# Patient Record
Sex: FEMALE | Race: WHITE | NOT HISPANIC OR LATINO | Employment: FULL TIME | URBAN - METROPOLITAN AREA
[De-identification: names, ages, dates, MRNs, and addresses within clinical notes are randomized per-mention and may not be internally consistent; named-entity substitution may affect disease eponyms.]

---

## 2023-03-18 PROBLEM — R63.5 WEIGHT GAIN: Status: ACTIVE | Noted: 2023-03-18

## 2023-03-18 PROBLEM — J32.9 CHRONIC RECURRENT SINUSITIS: Status: ACTIVE | Noted: 2023-03-18

## 2023-03-18 PROBLEM — J32.9 RECURRENT SINUS INFECTIONS: Status: ACTIVE | Noted: 2023-03-18

## 2023-03-18 PROBLEM — J34.89 NASAL CRUSTING: Status: ACTIVE | Noted: 2023-03-18

## 2023-03-18 PROBLEM — M62.89 PELVIC FLOOR DYSFUNCTION: Status: ACTIVE | Noted: 2023-03-18

## 2023-03-18 PROBLEM — H69.92 DYSFUNCTION OF LEFT EUSTACHIAN TUBE: Status: ACTIVE | Noted: 2023-03-18

## 2023-03-18 PROBLEM — J31.0 MIXED RHINITIS: Status: ACTIVE | Noted: 2023-03-18

## 2023-03-18 PROBLEM — F41.9 ANXIETY: Status: ACTIVE | Noted: 2023-03-18

## 2023-03-18 PROBLEM — N89.8 VAGINAL ITCHING: Status: ACTIVE | Noted: 2023-03-18

## 2023-03-18 PROBLEM — R00.2 PALPITATIONS: Status: ACTIVE | Noted: 2023-03-18

## 2023-03-18 PROBLEM — E03.9 HYPOTHYROIDISM: Status: ACTIVE | Noted: 2023-03-18

## 2023-03-18 PROBLEM — J34.89 SINUS PRESSURE: Status: ACTIVE | Noted: 2023-03-18

## 2023-03-18 PROBLEM — M54.9 MECHANICAL BACK PAIN: Status: ACTIVE | Noted: 2023-03-18

## 2023-03-18 PROBLEM — G58.9 PINCHED NERVE: Status: ACTIVE | Noted: 2023-03-18

## 2023-03-18 PROBLEM — N63.10 PAINFUL LUMPY RIGHT BREAST: Status: ACTIVE | Noted: 2023-03-18

## 2023-03-18 PROBLEM — M99.08 RIB CAGE REGION SOMATIC DYSFUNCTION: Status: ACTIVE | Noted: 2023-03-18

## 2023-03-18 PROBLEM — F41.8 SITUATIONAL ANXIETY: Status: ACTIVE | Noted: 2023-03-18

## 2023-03-18 PROBLEM — E03.8 SECONDARY HYPOTHYROIDISM: Status: ACTIVE | Noted: 2023-03-18

## 2023-03-18 PROBLEM — M99.03 LUMBAR REGION SOMATIC DYSFUNCTION: Status: ACTIVE | Noted: 2023-03-18

## 2023-03-18 PROBLEM — R51.9 LEFT TEMPORAL HEADACHE: Status: ACTIVE | Noted: 2023-03-18

## 2023-03-18 PROBLEM — J34.89 NASAL OBSTRUCTION: Status: ACTIVE | Noted: 2023-03-18

## 2023-03-18 PROBLEM — M99.06 SOMATIC DYSFUNCTION OF LOWER EXTREMITIES: Status: ACTIVE | Noted: 2023-03-18

## 2023-03-18 PROBLEM — I87.2 CHRONIC VENOUS INSUFFICIENCY: Status: ACTIVE | Noted: 2023-03-18

## 2023-03-18 PROBLEM — M62.830 SPASM OF THORACIC BACK MUSCLE: Status: ACTIVE | Noted: 2023-03-18

## 2023-03-18 PROBLEM — R19.7 DIARRHEA: Status: ACTIVE | Noted: 2023-03-18

## 2023-03-18 PROBLEM — M99.00 CRANIAL SOMATIC DYSFUNCTION: Status: ACTIVE | Noted: 2023-03-18

## 2023-03-18 PROBLEM — F07.81 POST CONCUSSIVE SYNDROME: Status: ACTIVE | Noted: 2023-03-18

## 2023-03-18 PROBLEM — R39.9 UTI SYMPTOMS: Status: ACTIVE | Noted: 2023-03-18

## 2023-03-18 PROBLEM — J31.0 CHRONIC RHINITIS: Status: ACTIVE | Noted: 2023-03-18

## 2023-03-18 PROBLEM — D83.9 CVID (COMMON VARIABLE IMMUNODEFICIENCY) (MULTI): Status: ACTIVE | Noted: 2023-03-18

## 2023-03-18 PROBLEM — R42 DIZZINESS: Status: ACTIVE | Noted: 2023-03-18

## 2023-03-18 PROBLEM — G54.0 THORACIC OUTLET SYNDROME: Status: ACTIVE | Noted: 2023-03-18

## 2023-03-18 PROBLEM — M99.05 PELVIC REGION SOMATIC DYSFUNCTION: Status: ACTIVE | Noted: 2023-03-18

## 2023-03-18 PROBLEM — M79.604 RIGHT LEG PAIN: Status: ACTIVE | Noted: 2023-03-18

## 2023-03-18 PROBLEM — N94.9 VAGINAL LUMP: Status: ACTIVE | Noted: 2023-03-18

## 2023-03-18 PROBLEM — N94.6 DYSMENORRHEA: Status: ACTIVE | Noted: 2023-03-18

## 2023-03-18 PROBLEM — G43.009 COMMON MIGRAINE WITHOUT AURA: Status: ACTIVE | Noted: 2023-03-18

## 2023-03-18 PROBLEM — R09.81 NASAL CONGESTION: Status: ACTIVE | Noted: 2023-03-18

## 2023-03-18 PROBLEM — M54.9 UPPER BACK PAIN: Status: ACTIVE | Noted: 2023-03-18

## 2023-03-18 PROBLEM — M54.12 CERVICAL RADICULAR PAIN: Status: ACTIVE | Noted: 2023-03-18

## 2023-03-18 PROBLEM — E66.811 CLASS 1 OBESITY WITH BODY MASS INDEX (BMI) OF 32.0 TO 32.9 IN ADULT: Status: ACTIVE | Noted: 2023-03-18

## 2023-03-18 PROBLEM — I82.539 CHRONIC DEEP VEIN THROMBOSIS (DVT) OF POPLITEAL VEIN (MULTI): Status: ACTIVE | Noted: 2023-03-18

## 2023-03-18 PROBLEM — J45.30 MILD PERSISTENT ASTHMA (HHS-HCC): Status: ACTIVE | Noted: 2023-03-18

## 2023-03-18 PROBLEM — M77.9 TENDONITIS: Status: ACTIVE | Noted: 2023-03-18

## 2023-03-18 PROBLEM — M25.572 PAIN, JOINT, ANKLE, LEFT: Status: ACTIVE | Noted: 2023-03-18

## 2023-03-18 PROBLEM — J45.991 COUGH VARIANT ASTHMA (HHS-HCC): Status: ACTIVE | Noted: 2023-03-18

## 2023-03-18 PROBLEM — R59.1 LYMPHADENOPATHY: Status: ACTIVE | Noted: 2023-03-18

## 2023-03-18 PROBLEM — E66.9 CLASS 1 OBESITY WITH BODY MASS INDEX (BMI) OF 32.0 TO 32.9 IN ADULT: Status: ACTIVE | Noted: 2023-03-18

## 2023-03-18 PROBLEM — R09.82 POST-NASAL DRAINAGE: Status: ACTIVE | Noted: 2023-03-18

## 2023-03-18 PROBLEM — N36.8 URETHRAL IRRITATION: Status: ACTIVE | Noted: 2023-03-18

## 2023-03-18 PROBLEM — K21.9 GERD (GASTROESOPHAGEAL REFLUX DISEASE): Status: ACTIVE | Noted: 2023-03-18

## 2023-03-18 PROBLEM — M99.07 SEGMENTAL AND SOMATIC DYSFUNCTION OF UPPER EXTREMITY: Status: ACTIVE | Noted: 2023-03-18

## 2023-03-18 PROBLEM — H81.10 BENIGN POSITIONAL VERTIGO: Status: ACTIVE | Noted: 2023-03-18

## 2023-03-18 PROBLEM — R51.9 HEADACHE: Status: ACTIVE | Noted: 2023-03-18

## 2023-03-18 PROBLEM — S09.90XA HEAD TRAUMA: Status: ACTIVE | Noted: 2023-03-18

## 2023-03-18 PROBLEM — E66.811 CLASS 1 OBESITY WITH BODY MASS INDEX (BMI) OF 30.0 TO 30.9 IN ADULT: Status: ACTIVE | Noted: 2023-03-18

## 2023-03-18 PROBLEM — N94.10 DYSPAREUNIA IN FEMALE: Status: ACTIVE | Noted: 2023-03-18

## 2023-03-18 PROBLEM — S16.1XXA NECK STRAIN: Status: ACTIVE | Noted: 2023-03-18

## 2023-03-18 PROBLEM — M99.02 SEGMENTAL AND SOMATIC DYSFUNCTION OF THORACIC REGION: Status: ACTIVE | Noted: 2023-03-18

## 2023-03-18 PROBLEM — M54.50 RIGHT LOW BACK PAIN: Status: ACTIVE | Noted: 2023-03-18

## 2023-03-18 PROBLEM — M54.2 NECK PAIN: Status: ACTIVE | Noted: 2023-03-18

## 2023-03-18 PROBLEM — N92.0 HEAVY MENSES: Status: ACTIVE | Noted: 2023-03-18

## 2023-03-18 PROBLEM — R45.88 NONSUICIDAL SELF-HARM (MULTI): Status: ACTIVE | Noted: 2023-03-18

## 2023-03-18 PROBLEM — M25.512 LEFT SHOULDER PAIN: Status: ACTIVE | Noted: 2023-03-18

## 2023-03-18 PROBLEM — M99.01 CERVICAL SOMATIC DYSFUNCTION: Status: ACTIVE | Noted: 2023-03-18

## 2023-03-18 PROBLEM — M99.09 SOMATIC DYSFUNCTION OF ABDOMINAL REGION: Status: ACTIVE | Noted: 2023-03-18

## 2023-03-18 PROBLEM — E66.9 CLASS 1 OBESITY WITH BODY MASS INDEX (BMI) OF 30.0 TO 30.9 IN ADULT: Status: ACTIVE | Noted: 2023-03-18

## 2023-03-18 PROBLEM — K22.4 ESOPHAGEAL SPASM: Status: ACTIVE | Noted: 2023-03-18

## 2023-03-18 PROBLEM — J34.2 NASAL SEPTAL DEVIATION: Status: ACTIVE | Noted: 2023-03-18

## 2023-03-18 PROBLEM — R55 SYNCOPE AND COLLAPSE: Status: ACTIVE | Noted: 2023-03-18

## 2023-03-18 PROBLEM — Q74.2 ACCESSORY NAVICULAR BONE OF RIGHT FOOT: Status: ACTIVE | Noted: 2023-03-18

## 2023-03-18 PROBLEM — M54.2 CERVICALGIA: Status: ACTIVE | Noted: 2023-03-18

## 2023-03-18 PROBLEM — R45.0 JITTERY FEELING: Status: ACTIVE | Noted: 2023-03-18

## 2023-03-18 PROBLEM — N64.4 PAINFUL LUMPY RIGHT BREAST: Status: ACTIVE | Noted: 2023-03-18

## 2023-03-18 PROBLEM — M99.04 SOMATIC DYSFUNCTION OF SACRAL REGION: Status: ACTIVE | Noted: 2023-03-18
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
## 2023-03-18 PROBLEM — S06.0XAA CONCUSSION: Status: ACTIVE | Noted: 2023-03-18

## 2023-03-18 PROBLEM — J30.9 ALLERGIC RHINITIS: Status: ACTIVE | Noted: 2023-03-18

## 2023-03-18 PROBLEM — R19.8 CHANGE IN BOWEL MOVEMENT: Status: ACTIVE | Noted: 2023-03-18

## 2023-03-18 RX ORDER — IMMUNE GLOBULIN INFUSION (HUMAN) 100 MG/ML
15 INJECTION, SOLUTION INTRAVENOUS; SUBCUTANEOUS
COMMUNITY
Start: 2016-06-28

## 2023-03-18 RX ORDER — CETIRIZINE HYDROCHLORIDE 10 MG/1
10 TABLET ORAL DAILY
COMMUNITY

## 2023-03-18 RX ORDER — OMEPRAZOLE 20 MG/1
40 CAPSULE, DELAYED RELEASE ORAL DAILY PRN
COMMUNITY
Start: 2020-06-12 | End: 2023-12-18 | Stop reason: WASHOUT

## 2023-03-18 RX ORDER — FLUTICASONE FUROATE 27.5 UG/1
SPRAY, METERED NASAL
COMMUNITY
Start: 2022-02-07

## 2023-03-18 RX ORDER — PAROXETINE HYDROCHLORIDE 20 MG/1
3 TABLET, FILM COATED ORAL
COMMUNITY
Start: 2013-09-19 | End: 2023-05-30 | Stop reason: SDUPTHER

## 2023-03-18 RX ORDER — ALBUTEROL SULFATE 90 UG/1
2 AEROSOL, METERED RESPIRATORY (INHALATION) 4 TIMES DAILY PRN
COMMUNITY
Start: 2021-12-28

## 2023-03-18 RX ORDER — MUPIROCIN 20 MG/G
OINTMENT TOPICAL
COMMUNITY
Start: 2022-02-07

## 2023-03-18 RX ORDER — MULTIVITAMIN
1 TABLET ORAL
COMMUNITY
Start: 2020-06-12 | End: 2023-12-18 | Stop reason: WASHOUT

## 2023-03-18 RX ORDER — AZELASTINE HCL 205.5 UG/1
SPRAY NASAL
COMMUNITY
Start: 2022-02-07

## 2023-03-18 RX ORDER — LEVOTHYROXINE SODIUM 100 UG/1
1 CAPSULE ORAL
COMMUNITY
Start: 2022-06-21 | End: 2023-05-17 | Stop reason: SDUPTHER

## 2023-03-18 RX ORDER — LIDOCAINE AND PRILOCAINE 25; 25 MG/G; MG/G
CREAM TOPICAL
COMMUNITY
Start: 2020-11-20

## 2023-03-22 ENCOUNTER — APPOINTMENT (OUTPATIENT)
Dept: PRIMARY CARE | Facility: CLINIC | Age: 38
End: 2023-03-22
Payer: COMMERCIAL

## 2023-04-20 ENCOUNTER — TELEPHONE (OUTPATIENT)
Dept: PRIMARY CARE | Facility: CLINIC | Age: 38
End: 2023-04-20
Payer: COMMERCIAL

## 2023-04-20 NOTE — TELEPHONE ENCOUNTER
Patient requesting referral for new vascular doctor, states she has a hard time getting into her current one with [facility]. If you are agreeable, she needs a referral with her diagnosis of DVT faxed to [facility] at [phone] or if you need her to come in to discuss further, please let her know

## 2023-04-24 NOTE — TELEPHONE ENCOUNTER
Per HIPAA called and left a detailed msg to call the office and schedule a follow up appointment to discuss the referral in detail.

## 2023-05-02 ENCOUNTER — LAB (OUTPATIENT)
Dept: LAB | Facility: LAB | Age: 38
End: 2023-05-02
Payer: COMMERCIAL

## 2023-05-02 ENCOUNTER — OFFICE VISIT (OUTPATIENT)
Dept: PRIMARY CARE | Facility: CLINIC | Age: 38
End: 2023-05-02
Payer: COMMERCIAL

## 2023-05-02 VITALS
HEART RATE: 84 BPM | BODY MASS INDEX: 32.23 KG/M2 | WEIGHT: 188.8 LBS | OXYGEN SATURATION: 98 % | TEMPERATURE: 98.2 F | DIASTOLIC BLOOD PRESSURE: 76 MMHG | HEIGHT: 64 IN | SYSTOLIC BLOOD PRESSURE: 112 MMHG

## 2023-05-02 DIAGNOSIS — E03.9 HYPOTHYROIDISM, UNSPECIFIED TYPE: ICD-10-CM

## 2023-05-02 DIAGNOSIS — F41.9 ANXIETY: ICD-10-CM

## 2023-05-02 DIAGNOSIS — E16.2 HYPOGLYCEMIA: ICD-10-CM

## 2023-05-02 DIAGNOSIS — I87.2 VENOUS INSUFFICIENCY OF RIGHT LEG: Primary | ICD-10-CM

## 2023-05-02 PROBLEM — B96.89 ACUTE BACTERIAL SINUSITIS: Status: ACTIVE | Noted: 2023-05-02

## 2023-05-02 PROBLEM — S06.0X0A CONCUSSION WITH NO LOSS OF CONSCIOUSNESS: Status: ACTIVE | Noted: 2023-05-02

## 2023-05-02 PROBLEM — J01.90 ACUTE BACTERIAL SINUSITIS: Status: ACTIVE | Noted: 2023-05-02

## 2023-05-02 LAB — THYROTROPIN (MIU/L) IN SER/PLAS BY DETECTION LIMIT <= 0.05 MIU/L: 3.71 MIU/L (ref 0.44–3.98)

## 2023-05-02 PROCEDURE — 1036F TOBACCO NON-USER: CPT | Performed by: NURSE PRACTITIONER

## 2023-05-02 PROCEDURE — 84443 ASSAY THYROID STIM HORMONE: CPT

## 2023-05-02 PROCEDURE — 36415 COLL VENOUS BLD VENIPUNCTURE: CPT

## 2023-05-02 PROCEDURE — 99214 OFFICE O/P EST MOD 30 MIN: CPT | Performed by: NURSE PRACTITIONER

## 2023-05-02 RX ORDER — CARIPRAZINE 1.5 MG/1
1.5 CAPSULE, GELATIN COATED ORAL NIGHTLY
COMMUNITY
Start: 2023-04-11 | End: 2023-12-18

## 2023-05-02 RX ORDER — EPINEPHRINE 0.3 MG/.3ML
INJECTION SUBCUTANEOUS
COMMUNITY
Start: 2023-04-03

## 2023-05-02 ASSESSMENT — ENCOUNTER SYMPTOMS
COUGH: 0
PALPITATIONS: 0
CHEST TIGHTNESS: 0
CHILLS: 0
SHORTNESS OF BREATH: 0
FATIGUE: 0
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
## 2023-05-04 ASSESSMENT — ENCOUNTER SYMPTOMS
NERVOUS/ANXIOUS: 1
DIZZINESS: 1
HEADACHES: 1
DYSPHORIC MOOD: 0
SLEEP DISTURBANCE: 0

## 2023-05-08 ENCOUNTER — TELEPHONE (OUTPATIENT)
Dept: PRIMARY CARE | Facility: CLINIC | Age: 38
End: 2023-05-08
Payer: COMMERCIAL

## 2023-05-08 NOTE — TELEPHONE ENCOUNTER
----- Message from FADY Ornelas-CNP sent at 5/2/2023 10:25 PM EDT -----  Please let her know her TSH was in the normal range.

## 2023-05-16 ENCOUNTER — TELEPHONE (OUTPATIENT)
Dept: PRIMARY CARE | Facility: CLINIC | Age: 38
End: 2023-05-16
Payer: COMMERCIAL

## 2023-05-17 DIAGNOSIS — E03.8 SECONDARY HYPOTHYROIDISM: Primary | ICD-10-CM

## 2023-05-17 RX ORDER — LEVOTHYROXINE SODIUM 100 UG/1
100 CAPSULE ORAL
Qty: 90 CAPSULE | Refills: 3 | Status: SHIPPED | OUTPATIENT
Start: 2023-05-17 | End: 2024-05-09 | Stop reason: SDUPTHER

## 2023-05-26 ENCOUNTER — TELEPHONE (OUTPATIENT)
Dept: PRIMARY CARE | Facility: CLINIC | Age: 38
End: 2023-05-26
Payer: COMMERCIAL

## 2023-05-26 NOTE — TELEPHONE ENCOUNTER
Patient is no longer seeing her therapist, so is requiring a refill on Paxil 20mg to [facility]

## 2023-05-29 DIAGNOSIS — F41.9 ANXIETY: Primary | ICD-10-CM

## 2023-05-30 RX ORDER — PAROXETINE HYDROCHLORIDE 20 MG/1
60 TABLET, FILM COATED ORAL
Qty: 90 TABLET | Refills: 0 | Status: SHIPPED | OUTPATIENT
Start: 2023-05-30 | End: 2023-12-12 | Stop reason: SDUPTHER

## 2023-06-02 NOTE — TELEPHONE ENCOUNTER
Per HIPAA called and left detailed msg to call the office and let us know if she wants a referral or what she would like the next step to be.

## 2023-11-03 ENCOUNTER — TELEPHONE (OUTPATIENT)
Dept: PRIMARY CARE | Facility: CLINIC | Age: 38
End: 2023-11-03
Payer: COMMERCIAL

## 2023-11-04 DIAGNOSIS — E03.8 SECONDARY HYPOTHYROIDISM: ICD-10-CM

## 2023-12-12 ENCOUNTER — OFFICE VISIT (OUTPATIENT)
Dept: OBSTETRICS AND GYNECOLOGY | Facility: CLINIC | Age: 38
End: 2023-12-12
Payer: COMMERCIAL

## 2023-12-12 VITALS
DIASTOLIC BLOOD PRESSURE: 87 MMHG | HEART RATE: 83 BPM | SYSTOLIC BLOOD PRESSURE: 136 MMHG | WEIGHT: 188.2 LBS | BODY MASS INDEX: 33.35 KG/M2 | HEIGHT: 63 IN

## 2023-12-12 DIAGNOSIS — N80.9 ENDOMETRIOSIS: ICD-10-CM

## 2023-12-12 DIAGNOSIS — Z12.4 CERVICAL CANCER SCREENING: ICD-10-CM

## 2023-12-12 DIAGNOSIS — Z01.419 WELL WOMAN EXAM WITH ROUTINE GYNECOLOGICAL EXAM: Primary | ICD-10-CM

## 2023-12-12 LAB
POC APPEARANCE, URINE: CLEAR
POC BILIRUBIN, URINE: NEGATIVE
POC BLOOD, URINE: NEGATIVE
POC COLOR, URINE: NORMAL
POC GLUCOSE, URINE: NEGATIVE MG/DL
POC KETONES, URINE: NEGATIVE MG/DL
POC LEUKOCYTES, URINE: NEGATIVE
POC NITRITE,URINE: NEGATIVE
POC PH, URINE: 7 PH
POC PROTEIN, URINE: NEGATIVE MG/DL
POC SPECIFIC GRAVITY, URINE: 1.01
POC UROBILINOGEN, URINE: 0.2 EU/DL

## 2023-12-12 PROCEDURE — 1036F TOBACCO NON-USER: CPT | Performed by: NURSE PRACTITIONER

## 2023-12-12 PROCEDURE — 99395 PREV VISIT EST AGE 18-39: CPT | Performed by: NURSE PRACTITIONER

## 2023-12-12 PROCEDURE — 87624 HPV HI-RISK TYP POOLED RSLT: CPT | Performed by: NURSE PRACTITIONER

## 2023-12-12 PROCEDURE — 88141 CYTOPATH C/V INTERPRET: CPT | Performed by: PATHOLOGY

## 2023-12-12 PROCEDURE — 88175 CYTOPATH C/V AUTO FLUID REDO: CPT | Mod: TC,GCY | Performed by: NURSE PRACTITIONER

## 2023-12-12 RX ORDER — DIPHENHYDRAMINE HCL 25 MG
25 TABLET ORAL
COMMUNITY

## 2023-12-12 RX ORDER — OMEPRAZOLE 40 MG/1
40 CAPSULE, DELAYED RELEASE ORAL DAILY
COMMUNITY

## 2023-12-12 RX ORDER — PAROXETINE 30 MG/1
60 TABLET, FILM COATED ORAL DAILY
COMMUNITY
Start: 2023-11-26

## 2023-12-12 RX ORDER — BUDESONIDE 0.5 MG/2ML
0.5 INHALANT ORAL
COMMUNITY
Start: 2023-11-09

## 2023-12-12 ASSESSMENT — ENCOUNTER SYMPTOMS
RESPIRATORY NEGATIVE: 1
EYES NEGATIVE: 1
CONSTITUTIONAL NEGATIVE: 1
DYSURIA: 1
PSYCHIATRIC NEGATIVE: 1
NEUROLOGICAL NEGATIVE: 1
CARDIOVASCULAR NEGATIVE: 1
GASTROINTESTINAL NEGATIVE: 1

## 2023-12-12 ASSESSMENT — PAIN SCALES - GENERAL: PAINLEVEL: 0-NO PAIN

## 2023-12-12 ASSESSMENT — PATIENT HEALTH QUESTIONNAIRE - PHQ9
2. FEELING DOWN, DEPRESSED OR HOPELESS: NOT AT ALL
1. LITTLE INTEREST OR PLEASURE IN DOING THINGS: NOT AT ALL
SUM OF ALL RESPONSES TO PHQ9 QUESTIONS 1 AND 2: 0

## 2023-12-12 NOTE — PROGRESS NOTES
"Subjective   Rachelle Laurent is a 37 y.o. female who is here for a routine exam. Periods are irregular, 24 - 31 days lasting  varies  days. Dysmenorrhea:severe, occurring throughout menses and not specified . Heavy bleeding with menstrual period. Cyclic symptoms include none. No intermenstrual bleeding, spotting, or discharge.    Current contraception: none  History of abnormal Pap smear: no  Family history of uterine or ovarian cancer: no  Regular self breast exam: yes  History of abnormal mammogram: no  Family history of breast cancer: no  History of abnormal lipids: no  Menstrual History:  OB History    No obstetric history on file.          Patient's last menstrual period was 11/26/2023 (exact date).       HPI  Record Review  - Last seen by FADY Timmons-CNP Feb. '22 for pelvic floor issues and was supposed to have a workup done for endometriosis   - Referred to PFPT  - Normal PAP in '21, due for PAP in '24  - Gets a gammaguard white blood cell infusion once a week due to having a white blood cell deficiency  - Was informed that she is high risk of cervical cancer    OB/GYN Hx:  - She was going to a fertility specialist, and they determined that it was better if she did not try to have children given her health issues   - Saw a GYN at [facility], but did not like them so she wants to be established at   - Had a cyst removed off of her cervix, causing dyspenuria to stop    Vaginal/ Pelvic floor  - Never attended PFPT due to a concussion  - Does not bleed after intercourse   Review of Systems   Constitutional: Negative.    HENT: Negative.     Eyes: Negative.    Respiratory: Negative.     Cardiovascular: Negative.    Gastrointestinal: Negative.    Genitourinary:  Positive for dysuria. Negative for dyspareunia.   Neurological: Negative.    Psychiatric/Behavioral: Negative.         Objective   /87 (BP Location: Right arm, Patient Position: Sitting)   Pulse 83   Ht 1.6 m (5' 3\")   Wt 85.4 " kg (188 lb 3.2 oz)   LMP 11/26/2023 (Exact Date)   Breastfeeding No   BMI 33.34 kg/m²     OBGyn Exam   General:   alert, appears stated age, and cooperative   Heart: regular rate and rhythm, S1, S2 normal, no murmur, click, rub or gallop   Lungs: clear to auscultation bilaterally   Abdomen: soft, non-tender, without masses or organomegaly   Vulva: normal   Vagina: normal mucosa   Cervix: no lesions   Uterus: normal size   Adnexa: normal adnexa     Assessment/Plan   37 y.o. assessed in clinic for [facility]   Comorbidities include: Chronic rhinitis, HG, GERD, Anxiety    Pap smear..  - PAP collected  - Normal breast exam  - Referral to Dr. Patricia Olvera sent     Follow-up with Dr. Olvera for further GYN care    DIONNE Walsh Megan Terrell, am scribing for virtually, and in the presence of DIONNE Timmons on 12/12/2023 at 8:24 PM.  I, DIONNE Walsh, personally performed the services described in this documentation which was scribed virtually and I confirm that it is both accurate and complete.

## 2023-12-29 LAB
CYTOLOGY CMNT CVX/VAG CYTO-IMP: NORMAL
HPV HR 12 DNA GENITAL QL NAA+PROBE: NEGATIVE
HPV HR GENOTYPES PNL CVX NAA+PROBE: NEGATIVE
HPV16 DNA SPEC QL NAA+PROBE: NEGATIVE
HPV18 DNA SPEC QL NAA+PROBE: NEGATIVE
LAB AP CONTRACEPTIVE HISTORY: NORMAL
LAB AP HPV GENOTYPE QUESTION: YES
LAB AP HPV HR: NORMAL
LABORATORY COMMENT REPORT: NORMAL
LMP START DATE: NORMAL
PATH REPORT.TOTAL CANCER: NORMAL

## 2024-01-03 ENCOUNTER — TELEPHONE (OUTPATIENT)
Dept: OBSTETRICS AND GYNECOLOGY | Facility: CLINIC | Age: 39
End: 2024-01-03
Payer: COMMERCIAL

## 2024-01-04 NOTE — RESULT ENCOUNTER NOTE
Pap with ASCUS result, which means that the cell is slightly abnormal in shape but not concerning because HPV -.    Per ASCCP guidelines, repeat tests in 3 years

## 2024-01-16 NOTE — TELEPHONE ENCOUNTER
Result Communication    Resulted Orders   POCT UA Automated manually resulted   Result Value Ref Range    POC Color, Urine Light-Yellow Straw, Yellow, Light-Yellow    POC Appearance, Urine Clear Clear    POC Glucose, Urine NEGATIVE NEGATIVE mg/dl    POC Bilirubin, Urine NEGATIVE NEGATIVE    POC Ketones, Urine NEGATIVE NEGATIVE mg/dl    POC Specific Gravity, Urine 1.010 1.005 - 1.035    POC Blood, Urine NEGATIVE NEGATIVE    POC PH, Urine 7.0 No Reference Range Established PH    POC Protein, Urine NEGATIVE NEGATIVE, 30 (1+) mg/dl    POC Urobilinogen, Urine 0.2 0.2, 1.0 EU/DL    Poc Nitrite, Urine NEGATIVE NEGATIVE    POC Leukocytes, Urine NEGATIVE NEGATIVE   THINPREP PAP TEST   Result Value Ref Range    Case Report       Gynecologic Cytology                              Case: [id number]                                   Authorizing Provider:  Kiesha Kellogg,         Collected:           12/12/2023 [facility]                                     APRN-CNP                                                                     Ordering Location:     [facility]   Received:            12/12/2023 [address] [place] [address]                                                                First Screen:          Elma Kink, CT                                                                Pathologist:           Chel Ballesteros MD                                                         Specimen:    ThinPrep Liquid-Based Pap-Imaging System Screen, CERVIX, SCREENING                         Final Cytological Interpretation       Squamous and/or Glandular Abnormality    A. THINPREP PAP CERVIX, SCREENING -     Specimen Adequacy  Satisfactory for evaluation; endocervical/transformation zone component is present    General Categorization  Epithelial cell abnormality- squamous cell, see interpretation.    Descriptive Interpretation  Atypical squamous cells of undetermined significance (ASC-US) -  Cervix      An HPV-including Genotype test is performed (per requisition) by the Molecular Diagnostics Laboratory at [facility].                Slide(s) initially screened by JACQUIE Hewitt at [facility] [address] [facility] [id number]  By the signature on this report, the individual or group listed as making the Final Interpretation/Diagnosis certifies that they have reviewed this case.       ThinPrep Imaging System       This specimen has been analyzed by the Notice KioskPrep Imaging System ([employer] an automated imaging and review system, which assists the laboratory in evaluating cells on ThinPrep Pap tests. Following automated imaging, selected fields from every slide were reviewed by a cytotechnologist and/or pathologist.        Educational Note       Cervical cytology is a screening procedure primarily for squamous cancers and precursors and has associated false-negative and false-positives results as evidenced by published data. Your patient's test should be interpreted in this context, together with the patient's history and clinical findings. Regular sampling and follow-up of unexplained clinical signs and symptoms are recommended to minimize false negative results.      Perform HPV HR test? Reflex if ASCUS only     Include HPV Genotype? Yes     LMP 11/26/2023     Contraceptive History       [diaphragm     HPV DNA High Risk With Genotype   Result Value Ref Range    HPV, high-risk Negative Negative    HPV Type 16 DNA Negative Negative    HPV Type 18 DNA Negative Negative    HPV non-Type 16 or 18 DNA Negative Negative    Narrative     Testing for high-risk (HR) types of human papilloma virus (HPV) is performed by the [employer] denise HPV Test. The denise HPV Test is a qualitative polymerase chain reaction that amplifies DNA of HPV16, HPV18, and 12 other high-risk HPV types (31, 33, 35, 39, 45, 51, 52, 56, 58, 59, 66, and 68) associated with cervical cancer and its precursor  lesions. A positive result indicates the presence of HPV DNA due to one or more of the 14 genotypes: 16, 18, 31, 33, 35, 39, 45, 51, 52, 56, 58, 59, 66, and 68. Negative results indicates HPV DNA concentrations are undectectable or below the pre-set threshold for detection. False negative results may be associated with unoptimized sampling. A negative HR HPV result does not exclude the possibility of future cytologic HSIL or underlying CIN2-3 or cancer.   This test is approved by the US Food and Drug Administration. Results of this test should be interpreted in conjunction with the patient Pap test results. Please refer to ASCCP current quidelines for the use of HPV DNA testing, result interpretation, and patient management.   The performance of this test was verified by the [facility] Diagnostic Laboratory at [facility]. The lab is certified under the Clinical Laboratory Amendments of 1988 (CLIA 88) as qualified to perform high complexity clinical laboratory testing.    PERFORMING LAB LOCATIONS  [facility]: [facility] [place] [address]         1:18 PM      Results were successfully communicated with the patient and they acknowledged their understanding.    Discussed of plan to see Dr Olvera for further GYN care

## 2024-01-16 NOTE — TELEPHONE ENCOUNTER
----- Message from FADY Walsh-CNP sent at 1/4/2024  1:29 PM EST -----  Pap with ASCUS result, which means that the cell is slightly abnormal in shape but not concerning because HPV -.    Per ASCCP guidelines, repeat tests in 3 years

## 2024-01-19 ENCOUNTER — OFFICE VISIT (OUTPATIENT)
Dept: OBSTETRICS AND GYNECOLOGY | Facility: CLINIC | Age: 39
End: 2024-01-19
Payer: COMMERCIAL

## 2024-01-19 VITALS — WEIGHT: 188 LBS | BODY MASS INDEX: 33.3 KG/M2

## 2024-01-19 DIAGNOSIS — L50.8 CHRONIC URTICARIA: ICD-10-CM

## 2024-01-19 DIAGNOSIS — N80.9 ENDOMETRIOSIS: ICD-10-CM

## 2024-01-19 DIAGNOSIS — D83.9 CVID (COMMON VARIABLE IMMUNODEFICIENCY) (MULTI): Primary | ICD-10-CM

## 2024-01-19 PROCEDURE — 99204 OFFICE O/P NEW MOD 45 MIN: CPT | Performed by: STUDENT IN AN ORGANIZED HEALTH CARE EDUCATION/TRAINING PROGRAM

## 2024-01-19 PROCEDURE — 1036F TOBACCO NON-USER: CPT | Performed by: STUDENT IN AN ORGANIZED HEALTH CARE EDUCATION/TRAINING PROGRAM

## 2024-01-19 RX ORDER — ACETAMINOPHEN 500 MG
1000 TABLET ORAL EVERY 6 HOURS PRN
COMMUNITY

## 2024-01-19 RX ORDER — FAMOTIDINE 40 MG/1
40 TABLET, FILM COATED ORAL 2 TIMES DAILY
COMMUNITY
Start: 2024-01-02

## 2024-01-19 ASSESSMENT — PAIN SCALES - GENERAL: PAINLEVEL: 0-NO PAIN

## 2024-01-19 NOTE — PROGRESS NOTES
Division of Minimally Invasive Gynecologic Surgery  [facility]    01/19/24 Gynecology Consult     HISTORY OF PRESENT ILLNESS:  Rachelle Laurent 38 y.o. P0 referred by Kiesha Kellogg for suspected endometriosis.     She has a long history of severe dysmenorrhea, with periods that vacillate between light and heavy. Monthly menses, lasting 2-4 days. Her dysmenorrhea has been severe enough in the past to keep her home from work. She does have dysuria on her cycle, but denies dyschezia and dyspareunia.     She has been struggling with fertility for some time. She did see CLIVE and [facility] at [facility]. She was told by CEDRIC that her L Fallopian tube was blocked. She spoke with MFM about her health risks for pregnancy at length, including hx of DVT while on BEHZAD and thrombogenic immune medication, as well her history of CVID. She also has well-managed OCD on paroxetine and reports it would recommended she come off of this medication for pregnancy.     She has decided that she does not think undertaking pregnancy is reasonable for her, but she is still considering surrogacy.     She tried BEHZAD's many years ago, but developed DVT on this method as above. Not currently on any suppression. She wonders whether endometriosis could account for her painful menses.     Imaging: Recent HSG unremarkable, no other recent pelvic imaging   Screening:   - Last pap 2023 ASCUS/negative, no hx of CIN2-3  PMHx: CVID, hx of DVT on BEHZAD in the context of concurrent thrombogenic immune medication, chronic urticaria (eyes swell, flare w/ in last year), chronic sinusitis, OCD   PSHx: oral surgery, upper endoscopy     PAST MEDICAL HISTORY:  Past Medical History:   Diagnosis Date    Acute deep vein thrombosis (DVT) of femoral vein of right lower extremity (CMS/HCC) 12/06/2016    Formatting of this note might be different from the original. Diagnosed 10/29/2016    CVID (common variable immunodeficiency) (CMS/[facility])     Deviated  nasal septum 06/09/2013    Acquired deviated nasal septum    Obsessive-compulsive disorder, unspecified 06/09/2013    Obsessive compulsive disorder    Other conditions influencing health status 06/09/2013    Brachial Plexus Palsy    Unspecified asthma, uncomplicated 06/09/2013    Extrinsic asthma     PAST SURGICAL HISTORY:  Past Surgical History:   Procedure Laterality Date    MOUTH SURGERY  07/02/2014    Oral Surgery Tooth Extraction       PHYSICAL EXAMINATION:  VITAL SIGNS: Wt 85.3 kg (188 lb)   LMP 12/26/2023   BMI 33.30 kg/m²      Constitutional:  No acute distress, well-nourished and well-developed  HEENT: EOM grossly intact, MMM, neck supple and with full ROM  Pulm:  Effort normal. No accessory muscle usage.  No respiratory distress.  Neurological:  She is alert and oriented to person place and time.  Skin: Warm, no pallor.  Psychiatric:  She has normal mood and affect.    ASSESSMENT:  Rachelle Laurent 38 y.o. P0 referred by Kiesha Kellogg for suspected endometriosis.   - We discussed today the multiple etiologies of chronic pelvic pain, including endometriosis, adenomyosis, GI etiologies, MSK etiologies, and centralization of pain. I am primarily suspicious of endometriosis in her case. We discussed the role of surgical excision in the management of endometriosis and the estimated 15-20% recurrence rate in the future. We also reviewed the association between endometriosis and adenomyosis, as well as the fact that definitive diagnosis of adenomyosis cannot be made without hysterectomy.   - We discussed the natural history of endometriosis and the fact that hormonal suppression is thought to have a role in slowing disease progression and managing symptoms of endometriosis, but cannot definitively reverse or eliminate endometriosis.   - She has not tried suppression medications in quite some time, and I would recommend proceeding with a trial of these medications before considering surgery. We did however  discuss the role and procedure for excisional surgery.   - We discussed options today, including POPs and Mirena IUD, at length.   - She is going to consider her options and will let me know when she makes a decision.   - I did also recommend PFPT and yoga for pelvic pain in the interim.       Patricia Olvera MD  01/19/24  10:24 AM

## 2024-02-06 ENCOUNTER — APPOINTMENT (OUTPATIENT)
Dept: PRIMARY CARE | Facility: CLINIC | Age: 39
End: 2024-02-06
Payer: COMMERCIAL

## 2024-02-28 ENCOUNTER — OFFICE VISIT (OUTPATIENT)
Dept: PRIMARY CARE | Facility: CLINIC | Age: 39
End: 2024-02-28
Payer: COMMERCIAL

## 2024-02-28 ENCOUNTER — LAB (OUTPATIENT)
Dept: LAB | Facility: LAB | Age: 39
End: 2024-02-28
Payer: COMMERCIAL

## 2024-02-28 VITALS
TEMPERATURE: 97.7 F | DIASTOLIC BLOOD PRESSURE: 82 MMHG | HEART RATE: 87 BPM | WEIGHT: 184.8 LBS | SYSTOLIC BLOOD PRESSURE: 122 MMHG | OXYGEN SATURATION: 98 % | BODY MASS INDEX: 32.74 KG/M2

## 2024-02-28 DIAGNOSIS — K21.9 GASTROESOPHAGEAL REFLUX DISEASE, UNSPECIFIED WHETHER ESOPHAGITIS PRESENT: ICD-10-CM

## 2024-02-28 DIAGNOSIS — R53.83 OTHER FATIGUE: Primary | ICD-10-CM

## 2024-02-28 DIAGNOSIS — E03.9 HYPOTHYROIDISM, UNSPECIFIED TYPE: Primary | ICD-10-CM

## 2024-02-28 DIAGNOSIS — E03.9 HYPOTHYROIDISM, UNSPECIFIED TYPE: ICD-10-CM

## 2024-02-28 LAB
25(OH)D3 SERPL-MCNC: 13 NG/ML (ref 30–100)
ALBUMIN SERPL BCP-MCNC: 4.6 G/DL (ref 3.4–5)
ALP SERPL-CCNC: 45 U/L (ref 33–110)
ALT SERPL W P-5'-P-CCNC: 12 U/L (ref 7–45)
ANION GAP SERPL CALC-SCNC: 11 MMOL/L (ref 10–20)
AST SERPL W P-5'-P-CCNC: 18 U/L (ref 9–39)
BASOPHILS # BLD AUTO: 0.03 X10*3/UL (ref 0–0.1)
BASOPHILS NFR BLD AUTO: 0.8 %
BILIRUB SERPL-MCNC: 0.3 MG/DL (ref 0–1.2)
BUN SERPL-MCNC: 13 MG/DL (ref 6–23)
CALCIUM SERPL-MCNC: 9.3 MG/DL (ref 8.6–10.3)
CHLORIDE SERPL-SCNC: 102 MMOL/L (ref 98–107)
CO2 SERPL-SCNC: 26 MMOL/L (ref 21–32)
CREAT SERPL-MCNC: 0.77 MG/DL (ref 0.5–1.05)
CRP SERPL-MCNC: 0.14 MG/DL
EGFRCR SERPLBLD CKD-EPI 2021: >90 ML/MIN/1.73M*2
EOSINOPHIL # BLD AUTO: 0 X10*3/UL (ref 0–0.7)
EOSINOPHIL NFR BLD AUTO: 0 %
ERYTHROCYTE [DISTWIDTH] IN BLOOD BY AUTOMATED COUNT: 13.2 % (ref 11.5–14.5)
ERYTHROCYTE [SEDIMENTATION RATE] IN BLOOD BY WESTERGREN METHOD: 10 MM/H (ref 0–20)
FERRITIN SERPL-MCNC: 17 NG/ML (ref 8–150)
GLUCOSE SERPL-MCNC: 80 MG/DL (ref 74–99)
HCT VFR BLD AUTO: 37.4 % (ref 36–46)
HGB BLD-MCNC: 11.7 G/DL (ref 12–16)
IMM GRANULOCYTES # BLD AUTO: 0.01 X10*3/UL (ref 0–0.7)
IMM GRANULOCYTES NFR BLD AUTO: 0.3 % (ref 0–0.9)
IRON SATN MFR SERPL: 15 % (ref 25–45)
IRON SERPL-MCNC: 63 UG/DL (ref 35–150)
LYMPHOCYTES # BLD AUTO: 1.19 X10*3/UL (ref 1.2–4.8)
LYMPHOCYTES NFR BLD AUTO: 32.9 %
MCH RBC QN AUTO: 26.7 PG (ref 26–34)
MCHC RBC AUTO-ENTMCNC: 31.3 G/DL (ref 32–36)
MCV RBC AUTO: 85 FL (ref 80–100)
MONOCYTES # BLD AUTO: 0.43 X10*3/UL (ref 0.1–1)
MONOCYTES NFR BLD AUTO: 11.9 %
NEUTROPHILS # BLD AUTO: 1.96 X10*3/UL (ref 1.2–7.7)
NEUTROPHILS NFR BLD AUTO: 54.1 %
NRBC BLD-RTO: 0 /100 WBCS (ref 0–0)
PLATELET # BLD AUTO: 269 X10*3/UL (ref 150–450)
POTASSIUM SERPL-SCNC: 4.3 MMOL/L (ref 3.5–5.3)
PROT SERPL-MCNC: 7.7 G/DL (ref 6.4–8.2)
RBC # BLD AUTO: 4.38 X10*6/UL (ref 4–5.2)
SODIUM SERPL-SCNC: 135 MMOL/L (ref 136–145)
TIBC SERPL-MCNC: 422 UG/DL (ref 240–445)
TSH SERPL-ACNC: 3.16 MIU/L (ref 0.44–3.98)
UIBC SERPL-MCNC: 359 UG/DL (ref 110–370)
WBC # BLD AUTO: 3.6 X10*3/UL (ref 4.4–11.3)

## 2024-02-28 PROCEDURE — 36415 COLL VENOUS BLD VENIPUNCTURE: CPT

## 2024-02-28 PROCEDURE — 82728 ASSAY OF FERRITIN: CPT

## 2024-02-28 PROCEDURE — 83540 ASSAY OF IRON: CPT

## 2024-02-28 PROCEDURE — 83550 IRON BINDING TEST: CPT

## 2024-02-28 PROCEDURE — 85652 RBC SED RATE AUTOMATED: CPT

## 2024-02-28 PROCEDURE — 82306 VITAMIN D 25 HYDROXY: CPT

## 2024-02-28 PROCEDURE — 99213 OFFICE O/P EST LOW 20 MIN: CPT | Performed by: NURSE PRACTITIONER

## 2024-02-28 PROCEDURE — 80053 COMPREHEN METABOLIC PANEL: CPT

## 2024-02-28 PROCEDURE — 86140 C-REACTIVE PROTEIN: CPT

## 2024-02-28 PROCEDURE — 82784 ASSAY IGA/IGD/IGG/IGM EACH: CPT

## 2024-02-28 PROCEDURE — 85025 COMPLETE CBC W/AUTO DIFF WBC: CPT

## 2024-02-28 PROCEDURE — 1036F TOBACCO NON-USER: CPT | Performed by: NURSE PRACTITIONER

## 2024-02-28 PROCEDURE — 84443 ASSAY THYROID STIM HORMONE: CPT

## 2024-02-28 RX ORDER — AMOXICILLIN AND CLAVULANATE POTASSIUM 875; 125 MG/1; MG/1
1 TABLET, FILM COATED ORAL EVERY 12 HOURS
COMMUNITY

## 2024-02-28 ASSESSMENT — ENCOUNTER SYMPTOMS
NAUSEA: 1
COUGH: 0
DIARRHEA: 0
WHEEZING: 0
WEAKNESS: 0
FEVER: 0
VOMITING: 0
HEADACHES: 0
CHILLS: 0
CHEST TIGHTNESS: 0
PALPITATIONS: 0
FATIGUE: 1
DIZZINESS: 0
CONSTIPATION: 1
PAIN: 1
NUMBNESS: 0

## 2024-02-28 ASSESSMENT — PATIENT HEALTH QUESTIONNAIRE - PHQ9
SUM OF ALL RESPONSES TO PHQ9 QUESTIONS 1 AND 2: 0
2. FEELING DOWN, DEPRESSED OR HOPELESS: NOT AT ALL
1. LITTLE INTEREST OR PLEASURE IN DOING THINGS: NOT AT ALL

## 2024-02-28 NOTE — PROGRESS NOTES
Subjective   Rachelle Laurent is a 38 y.o. female who presents for Med Refill (Levothyroxine ), Pain (Pain with urination. Gets weekly infusions), and Medication Question (Would like to try a tablet instead of capsules for Levothyroxine ).    Med Refill  Associated symptoms include fatigue and nausea. Pertinent negatives include no chest pain, chills, coughing, fever, headaches, numbness, vomiting or weakness.   Pain  Associated symptoms include constipation, fatigue and nausea. Pertinent negatives include no chest pain, diarrhea, fever, headaches, vomiting, weakness or wheezing.     She presents to the office today for medication refills and follow up.    She reports she has had burning with urination intermittently for 2-3 months. No frequency or urgency.  No fever or chills.   Has seen GYN - has had urine tested which did not show an infection.  She is UTD on PAP.    She would like to switch to Levothyroxine tablets due to GI upset. Has about 3 more months of current capsules which she would like to finish.  Has had hives on the tablet but still would like to try again  Will obtain an updated TSH level now and then also after the switch.    She is now following with psychiatry regularly.  She is following in [place].    Overall she has been feeling pretty good. She has done well working from home or since not working- much less infections that when she worked in person. Has been having a hard time finding a new job to work from home.    Diet: Overall pretty healthy-has a sweet tooth  Exercise: hiking 1-2 times a week and weight lifting.    Review of Systems   Constitutional:  Positive for fatigue. Negative for chills and fever.   Respiratory:  Negative for cough, chest tightness and wheezing.    Cardiovascular:  Negative for chest pain and palpitations.   Gastrointestinal:  Positive for constipation and nausea. Negative for diarrhea and vomiting.   Neurological:  Negative for dizziness, weakness, numbness  and headaches.     Objective   /82 (BP Location: Left arm, Patient Position: Sitting)   Pulse 87   Temp 36.5 °C (97.7 °F) (Temporal)   Wt 83.8 kg (184 lb 12.8 oz)   SpO2 98%   BMI 32.74 kg/m²     Physical Exam  Constitutional:       General: She is not in acute distress.     Appearance: Normal appearance. She is not toxic-appearing.   Eyes:      Extraocular Movements: Extraocular movements intact.      Conjunctiva/sclera: Conjunctivae normal.      Pupils: Pupils are equal, round, and reactive to light.   Neck:      Thyroid: No thyroid mass or thyromegaly.   Cardiovascular:      Rate and Rhythm: Normal rate and regular rhythm.      Pulses: Normal pulses.      Heart sounds: Normal heart sounds, S1 normal and S2 normal. No murmur heard.  Pulmonary:      Effort: Pulmonary effort is normal. No respiratory distress.      Breath sounds: Normal breath sounds and air entry.   Abdominal:      General: Bowel sounds are normal.      Palpations: Abdomen is soft.      Tenderness: There is no abdominal tenderness.   Musculoskeletal:      Right lower leg: No edema.      Left lower leg: No edema.   Lymphadenopathy:      Cervical: No cervical adenopathy.   Neurological:      Mental Status: She is alert and oriented to person, place, and time.   Psychiatric:         Attention and Perception: Attention normal.         Mood and Affect: Mood and affect normal.         Behavior: Behavior normal. Behavior is cooperative.         Thought Content: Thought content normal.         Cognition and Memory: Cognition normal.         Judgment: Judgment normal.       Assessment/Plan   Problem List Items Addressed This Visit       GERD (gastroesophageal reflux disease)     Stable on omeprazole.  Had a recent EGD.  She is following with GI.         Hypothyroidism - Primary    Relevant Orders    TSH with reflex to Free T4 if abnormal (Completed)       It has been a pleasure seeing you today!   This was a shared visit with the CHILANGO. I reviewed and verified the documentation.

## 2024-02-29 LAB
IGA SERPL-MCNC: 125 MG/DL (ref 70–400)
IGG SERPL-MCNC: 1500 MG/DL (ref 700–1600)
IGM SERPL-MCNC: 106 MG/DL (ref 40–230)

## 2024-03-05 ENCOUNTER — OFFICE VISIT (OUTPATIENT)
Dept: SPORTS MEDICINE | Facility: HOSPITAL | Age: 39
End: 2024-03-05
Payer: COMMERCIAL

## 2024-03-05 VITALS
OXYGEN SATURATION: 98 % | HEART RATE: 79 BPM | BODY MASS INDEX: 32.6 KG/M2 | WEIGHT: 184 LBS | HEIGHT: 63 IN | DIASTOLIC BLOOD PRESSURE: 77 MMHG | SYSTOLIC BLOOD PRESSURE: 131 MMHG

## 2024-03-05 DIAGNOSIS — S16.1XXA CERVICAL STRAIN, ACUTE, INITIAL ENCOUNTER: Primary | ICD-10-CM

## 2024-03-05 DIAGNOSIS — S06.0X0A CONCUSSION WITHOUT LOSS OF CONSCIOUSNESS, INITIAL ENCOUNTER: ICD-10-CM

## 2024-03-05 PROCEDURE — 99215 OFFICE O/P EST HI 40 MIN: CPT | Performed by: PEDIATRICS

## 2024-03-05 PROCEDURE — 1036F TOBACCO NON-USER: CPT | Performed by: PEDIATRICS
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
## 2024-03-08 ENCOUNTER — OFFICE VISIT (OUTPATIENT)
Dept: ORTHOPEDIC SURGERY | Facility: CLINIC | Age: 39
End: 2024-03-08
Payer: COMMERCIAL

## 2024-03-08 VITALS
WEIGHT: 183.75 LBS | DIASTOLIC BLOOD PRESSURE: 77 MMHG | HEIGHT: 63 IN | HEART RATE: 92 BPM | BODY MASS INDEX: 32.56 KG/M2 | SYSTOLIC BLOOD PRESSURE: 123 MMHG

## 2024-03-08 DIAGNOSIS — M99.00 SOMATIC DYSFUNCTION OF HEAD REGION: ICD-10-CM

## 2024-03-08 DIAGNOSIS — S06.0X0D CONCUSSION WITHOUT LOSS OF CONSCIOUSNESS, SUBSEQUENT ENCOUNTER: ICD-10-CM

## 2024-03-08 DIAGNOSIS — M99.07 SOMATIC DYSFUNCTION OF UPPER EXTREMITY: ICD-10-CM

## 2024-03-08 DIAGNOSIS — M99.01 SOMATIC DYSFUNCTION OF CERVICAL REGION: ICD-10-CM

## 2024-03-08 DIAGNOSIS — M99.02 SOMATIC DYSFUNCTION OF THORACIC REGION: ICD-10-CM

## 2024-03-08 DIAGNOSIS — M54.2 CERVICAL PAIN (NECK): Primary | ICD-10-CM

## 2024-03-08 DIAGNOSIS — M99.03 SOMATIC DYSFUNCTION OF LUMBAR REGION: ICD-10-CM

## 2024-03-08 PROCEDURE — 99214 OFFICE O/P EST MOD 30 MIN: CPT | Performed by: PEDIATRICS

## 2024-03-08 PROCEDURE — 98927 OSTEOPATH MANJ 5-6 REGIONS: CPT | Performed by: PEDIATRICS

## 2024-03-08 PROCEDURE — 1036F TOBACCO NON-USER: CPT | Performed by: PEDIATRICS

## 2024-03-08 ASSESSMENT — PAIN SCALES - GENERAL: PAINLEVEL: 6

## 2024-03-24 NOTE — PROGRESS NOTES
Physical Therapy    Physical Therapy Evaluation and Treatment      Patient Name: Rachelle Laurent  MRN: [id number]  Today's Date: 3/28/2024     PT Evaluation Time Entry  PT Evaluation (Moderate) Time Entry: 30  PT Therapeutic Procedures Time Entry  Therapeutic Exercise Time Entry: 25     Visit number: 1  Insurance: Kenia  Authorized visits: 30  Authorization end date: 6/27/2024    Assessment:  PT Assessment  PT Assessment Results: Decreased range of motion, Decreased endurance, Decreased mobility, Impaired vision, Pain  Rehab Prognosis: Good   Patient presents to therapy this date with a diagnosis of a concussion with resulting decreased ROM and activity tolerance, increased pain, increased VOMS, dizziness, headaches, and overall decreased quality of life.  Patient can benefit from skilled physical therapy to address her deficits and maximize the quality of her life.  Plan:  OP PT Plan  Treatment/Interventions: Education/ Instruction, Canalith repositioning, Manual therapy, Neuromuscular re-education, Therapeutic activities, Therapeutic exercises  PT Plan: Skilled PT  PT Frequency: 1 time per week  Duration: 12 weeks  Onset Date: 03/05/24  Rehab Potential: Good  Plan of Care Agreement: Patient    Current Problem:   1. Dizzy  Follow Up In Physical Therapy      2. Cervical strain, acute, initial encounter  Referral to Physical Therapy    Follow Up In Physical Therapy      3. Concussion without loss of consciousness, initial encounter  Referral to Physical Therapy    Follow Up In Physical Therapy      4. Cervicogenic headache  Follow Up In Physical Therapy          Subjective    Patient continues to have symptoms:  Screens watching TV(movement, sound and brightness) average 15 min  Scrolling on phone or Computer 2 min  Light: with computer 10 min  Dizzy: with retrieving objects from ground currently 0/10, last week  0-5/10.  Occurs with hunger, end of day denies with rolling in bed,   Driving: no free way no issues  with street drive  Trouble falling asleep: which is not without working out   Staying asleep due to neck pain, neck pain absent when in [place].  Neck pain currently 1/10, last week 1-8/10.  Worse with pillow, sitting on couch, working out(if use poor technique), better with working out, different bed and stretching(been doing old PT exercise)  Memory is off, forgets which room she was going into.  Headache daily currently 1/10 , last week 0-3/10 hat band position.    Walking for 1 mile to 1.5 miles 30-50 min with random spurts of dizziness walking varying paths.  Dizziness increased with scanning environment and increased HR >120 BPM.    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 On 3/5/24 her 78lb dog jerked in her arms and hit her L side of head with her neck.  Hurt by eye bone where she was hit and contralateral one.  Min sx 3/4/25 - neck pain, felt a little out of it.  Went for a walk later.  Watched tv - no sx w screen, trouble falling asleep.  Slept extra 20 min compared to usual. Bothered her scrolling on [employer].  Brightness ok though. TV today. HA, balance, dizzy, fatigue, trouble falling asleep, noise sen, foggy, memory.  Took 2 tylenol 3.4 and 3/5  iced. Mild noise sens.  Short walk 3/5/24 no problems.       3/11/24 She was referred by Dr. Johnson for OMT of her cervical spine. She continues to have concussion symptoms. Her neck pain is substantial today. She did switch pillows which has been a little helpful. He has been having intermittent headaches. Denies new numbness, tingling, weakness.  She plans to travel to [place] in the upcoming weeks.     Precautions:  Precautions  STEADI Fall Risk Score (The score of 4 or more indicates an  increased risk of falling): 2  Vital Signs:     Pain:  Pain Assessment  Pain Assessment: 0-10  Pain Score:  (neck 1/10 HA 1/10)  Home Living:     Prior Level of Function:     Headache prior 1-2 time a week located bridge of nose    Objective     CERVICAL ROM:    AROM   EXTENSION 50   FLEXION 50   RIGHT ROTATION 65   LEFT ROTATION 65   RIGHT LATERAL FLEXION 23   LEFT LATERAL FLEXION 40     CERVICAL SPECIAL TEST:  TRANSVERSE LIGAMENT  NEG   SHARP-YELITZA NEG      VERTEBRAL ARTERY SUBJECTIVE REPORTING FOR THE FOLLOWING:     NEGATIVE POSITIVE   DYPLOPIA X    DYSARTHRIA X    DYSPHAGIA X    NYSTAGMUS X    DROP ATTACK X    NAUSEA X    FACIAL NUMBNESS X    DIZZINESS X        OCULAR MOTOR EXAM:   NORMAL ABNORMAL   RANGE OF MOTION X    SMOOTH PURSUIT  X    HORIZONTAL SACCADES X    VERTICAL SACCADES X    EYE ALIGNMENT X    COVER X    UNCOVER X    CROSS COVER X    CONVERGENCE X      ALL OF THE BELOW WITH GOGGLES UNLESS OTHERWISE NOTED:  SPONTANEOUS    GAZE EVOKED    POST HEAD SHAKING NYSTAGMUS HORIZONTAL    POST HEAD SHAKING NYSTAGMUS VERTICAL      POSITIONAL TESTING:  [place] and [place] negative for dizziness or nystagmus    STATIC BALANCE:    HALF TANDEM:   EYES OPEN EYES CLOSED   SOLID 30 30   FOAM 30 30      ONELEG   EYES OPEN EYES CLOSED   SOLID 30 28 left  6 right       VESTIBULAR OCCULOMOTOR SENSITIVITY TEST:  Below rated out of 10, 0 no symptoms and 10 worst imaginable.   HEADACHE DIZZY NAUSEA FOGGINESS COMMENTS   BASELINE 1 0 0 0    SMOOTH PURSUIT 2 REPS VERTICAL AND HORIZONTAL 1 0 0 0    SACCADES HORIZONTAL 10REPS 1 0 2 0    SACCADES VERTICAL 10 REPS 1 0 2 0 Under shoot with correction   CONVERGENCE  3 TRIALS  2 0 1 0 MEASUREMENT IN cm  AVERAGE:< 5 cm   VOR HORIZONTAL 180 RPM 10 REPS 1 1 1 0    VOR VERTICAL 180 RPM 10 REPS 2 0 1 0    VMS TEST  50 RPM 5 REPS 2 0 1 0         Outcome Measures:  Other Measures  10M Walk Test: 5 sec  Visual-Oculomotor Scale (VOMS): 19  Other Outcome Measures: Post concussion symptom rating scale  "(10)     Treatments:    Discussed continuing cervical stretching, stop \"B\", walk 30-45 min keep HR below 130 BPM    EDUCATION:  Outpatient Education  Individual(s) Educated: Patient  Education Provided: POC, Home Exercise Program  Patient/Caregiver Demonstrated Understanding: yes  Plan of Care Discussed and Agreed Upon: yes  Patient Response to Education: Patient/Caregiver Verbalized Understanding of Information    Goals:  Active       PT Problem       PT Goal 1:  Patient will report her headache frequency and severity 75% better       Start:  03/29/24    Expected End:  06/27/24            PT Goal 2:  Patient will increase cervical lateral flexion to 0-35 degrees to allow the patient to scan environment without pain.        Start:  03/29/24    Expected End:  06/27/24            PT Goal 3:  Patient decrease VOMS to 10 points to decrease her motion sensitivity.       Start:  03/29/24    Expected End:  06/27/24            PT Goal 4:  Patient will increase exercise tolerance to 20 min at 145 to allow for return to aerobic activity       Start:  03/29/24    Expected End:  06/27/24            PT Goal 5:  Patient will be independent in Home Exercise Program to manage symptoms and maximize their functional independence.        Start:  03/29/24    Expected End:  06/27/24            Patient Stated Goal 1:  Work out full strength, get rid on dizziness and help neck pain       Start:  03/29/24    Expected End:  06/27/24                      "

## 2024-03-26 NOTE — TELEPHONE ENCOUNTER
LOV: 5.02.23     Know she need appointment asking for 30 days of Tirosint    [facility]  
Message relayed to PT via VM asked to call back is she needs to have RX resent to the pharmacy.  
No

## 2024-03-28 ENCOUNTER — EVALUATION (OUTPATIENT)
Dept: PHYSICAL THERAPY | Facility: CLINIC | Age: 39
End: 2024-03-28
Payer: COMMERCIAL

## 2024-03-28 DIAGNOSIS — G44.86 CERVICOGENIC HEADACHE: ICD-10-CM

## 2024-03-28 DIAGNOSIS — R42 DIZZY: Primary | ICD-10-CM

## 2024-03-28 DIAGNOSIS — S16.1XXA CERVICAL STRAIN, ACUTE, INITIAL ENCOUNTER: ICD-10-CM

## 2024-03-28 DIAGNOSIS — S06.0X0A CONCUSSION WITHOUT LOSS OF CONSCIOUSNESS, INITIAL ENCOUNTER: ICD-10-CM

## 2024-03-28 PROCEDURE — 97162 PT EVAL MOD COMPLEX 30 MIN: CPT | Mod: GP | Performed by: PHYSICAL THERAPIST

## 2024-03-28 PROCEDURE — 97110 THERAPEUTIC EXERCISES: CPT | Mod: GP | Performed by: PHYSICAL THERAPIST

## 2024-03-28 ASSESSMENT — ENCOUNTER SYMPTOMS
LOSS OF SENSATION IN FEET: 0
OCCASIONAL FEELINGS OF UNSTEADINESS: 0
DEPRESSION: 0

## 2024-03-28 ASSESSMENT — 10 METER WALK TEST (10METWT): AVERAGE: 5 SEC

## 2024-03-28 ASSESSMENT — PAIN - FUNCTIONAL ASSESSMENT: PAIN_FUNCTIONAL_ASSESSMENT: 0-10

## 2024-04-04 ENCOUNTER — LAB (OUTPATIENT)
Dept: LAB | Facility: LAB | Age: 39
End: 2024-04-04
Payer: COMMERCIAL

## 2024-04-04 DIAGNOSIS — E55.9 VITAMIN D DEFICIENCY, UNSPECIFIED: ICD-10-CM

## 2024-04-04 DIAGNOSIS — E61.1 IRON DEFICIENCY: Primary | ICD-10-CM

## 2024-04-04 LAB
25(OH)D3 SERPL-MCNC: 34 NG/ML (ref 30–100)
BASOPHILS # BLD AUTO: 0.05 X10*3/UL (ref 0–0.1)
BASOPHILS NFR BLD AUTO: 0.9 %
EOSINOPHIL # BLD AUTO: 0 X10*3/UL (ref 0–0.7)
EOSINOPHIL NFR BLD AUTO: 0 %
ERYTHROCYTE [DISTWIDTH] IN BLOOD BY AUTOMATED COUNT: 14 % (ref 11.5–14.5)
FERRITIN SERPL-MCNC: 20 NG/ML (ref 8–150)
HCT VFR BLD AUTO: 37.2 % (ref 36–46)
HGB BLD-MCNC: 11.6 G/DL (ref 12–16)
IMM GRANULOCYTES # BLD AUTO: 0.01 X10*3/UL (ref 0–0.7)
IMM GRANULOCYTES NFR BLD AUTO: 0.2 % (ref 0–0.9)
IRON SATN MFR SERPL: 13 % (ref 25–45)
IRON SERPL-MCNC: 56 UG/DL (ref 35–150)
LYMPHOCYTES # BLD AUTO: 1.89 X10*3/UL (ref 1.2–4.8)
LYMPHOCYTES NFR BLD AUTO: 32.9 %
MCH RBC QN AUTO: 27.1 PG (ref 26–34)
MCHC RBC AUTO-ENTMCNC: 31.2 G/DL (ref 32–36)
MCV RBC AUTO: 87 FL (ref 80–100)
MONOCYTES # BLD AUTO: 0.58 X10*3/UL (ref 0.1–1)
MONOCYTES NFR BLD AUTO: 10.1 %
NEUTROPHILS # BLD AUTO: 3.21 X10*3/UL (ref 1.2–7.7)
NEUTROPHILS NFR BLD AUTO: 55.9 %
NRBC BLD-RTO: 0 /100 WBCS (ref 0–0)
PLATELET # BLD AUTO: 242 X10*3/UL (ref 150–450)
RBC # BLD AUTO: 4.28 X10*6/UL (ref 4–5.2)
TIBC SERPL-MCNC: 417 UG/DL (ref 240–445)
TRANSFERRIN SERPL-MCNC: 316 MG/DL (ref 200–360)
UIBC SERPL-MCNC: 361 UG/DL (ref 110–370)
WBC # BLD AUTO: 5.7 X10*3/UL (ref 4.4–11.3)

## 2024-04-04 PROCEDURE — 36415 COLL VENOUS BLD VENIPUNCTURE: CPT

## 2024-04-04 PROCEDURE — 82306 VITAMIN D 25 HYDROXY: CPT

## 2024-04-04 PROCEDURE — 84466 ASSAY OF TRANSFERRIN: CPT

## 2024-04-04 PROCEDURE — 85025 COMPLETE CBC W/AUTO DIFF WBC: CPT

## 2024-04-04 PROCEDURE — 83540 ASSAY OF IRON: CPT

## 2024-04-04 PROCEDURE — 83550 IRON BINDING TEST: CPT

## 2024-04-04 PROCEDURE — 82728 ASSAY OF FERRITIN: CPT

## 2024-04-08 ENCOUNTER — TREATMENT (OUTPATIENT)
Dept: PHYSICAL THERAPY | Facility: CLINIC | Age: 39
End: 2024-04-08
Payer: COMMERCIAL

## 2024-04-08 DIAGNOSIS — G44.86 CERVICOGENIC HEADACHE: ICD-10-CM

## 2024-04-08 DIAGNOSIS — S06.0X0A CONCUSSION WITHOUT LOSS OF CONSCIOUSNESS, INITIAL ENCOUNTER: ICD-10-CM

## 2024-04-08 DIAGNOSIS — S16.1XXA CERVICAL STRAIN, ACUTE, INITIAL ENCOUNTER: ICD-10-CM

## 2024-04-08 DIAGNOSIS — R42 DIZZY: Primary | ICD-10-CM

## 2024-04-08 PROCEDURE — 97110 THERAPEUTIC EXERCISES: CPT | Mod: GP | Performed by: PHYSICAL THERAPIST

## 2024-04-08 ASSESSMENT — PAIN - FUNCTIONAL ASSESSMENT: PAIN_FUNCTIONAL_ASSESSMENT: 0-10

## 2024-04-08 NOTE — PROGRESS NOTES
"Physical Therapy    Physical Therapy Treatment    Patient Name: Rachelle Laurent  MRN: [id number]  Today's Date: 4/8/2024  Time Calculation  Start Time: 0415  Stop Time: 0500  Time Calculation (min): 45 min     PT Therapeutic Procedures Time Entry  Therapeutic Exercise Time Entry: 40     Visit number: 1  Insurance: Kenia  Authorized visits: 30  Authorization end date: 6/27/2024    Assessment:     Patient with good tolerance of exercises up to 148 BPM without symptoms but report of PER of 19/20 so Meghan stopped.      Plan:     Continue to work on cervical tone to control headaches and cervical pain.    Current Problem  1. Dizzy  Follow Up In Physical Therapy      2. Cervical strain, acute, initial encounter  Follow Up In Physical Therapy      3. Concussion without loss of consciousness, initial encounter  Follow Up In Physical Therapy      4. Cervicogenic headache  Follow Up In Physical Therapy        Subjective    Patient reports staring at sun for the last 2 hours(eclipse) today.  Patient states headaches are on and off, but struggled with more spaciness.  Driving for one hour caused increased headache and had to get a ride home.  Patient reports that it was not so much the headache that concerned her just the mental fatigue.  Patient reports more mental fatigue with increased sun.    Pain  Pain Assessment  Pain Assessment: 0-10  Pain Score:  (headache behind eye left)    Objective     Treatments:  Lateral flexion stretch 3 times 30\" *   Levator scap 3 times 30\" *  Meghan with HR of 148 without symptoms of concussion and exertion rating of 19/20 so Meghan stopped.  Patient to work out at normal work out with exertion at hard as well as without concussion symptoms    SCOTT  Discussed continuing cervical stretching, stop \"B\", walk 30-45 min keep HR below 130 BPM    OP EDUCATION:       Goals:  Active       PT Problem       PT Goal 1:  Patient will report her headache frequency and severity 75% better       Start:  " 03/29/24    Expected End:  06/27/24            PT Goal 2:  Patient will increase cervical lateral flexion to 0-35 degrees to allow the patient to scan environment without pain.        Start:  03/29/24    Expected End:  06/27/24            PT Goal 3:  Patient decrease VOMS to 10 points to decrease her motion sensitivity.       Start:  03/29/24    Expected End:  06/27/24            PT Goal 4:  Patient will increase exercise tolerance to 20 min at 145 to allow for return to aerobic activity       Start:  03/29/24    Expected End:  06/27/24            PT Goal 5:  Patient will be independent in Home Exercise Program to manage symptoms and maximize their functional independence.        Start:  03/29/24    Expected End:  06/27/24            Patient Stated Goal 1:  Work out full strength, get rid on dizziness and help neck pain       Start:  03/29/24    Expected End:  06/27/24

## 2024-04-12 DIAGNOSIS — S06.0X0A CONCUSSION WITHOUT LOSS OF CONSCIOUSNESS, INITIAL ENCOUNTER: Primary | ICD-10-CM

## 2024-04-18 ENCOUNTER — TREATMENT (OUTPATIENT)
Dept: PHYSICAL THERAPY | Facility: CLINIC | Age: 39
End: 2024-04-18
Payer: COMMERCIAL

## 2024-04-18 DIAGNOSIS — S16.1XXA CERVICAL STRAIN, ACUTE, INITIAL ENCOUNTER: ICD-10-CM

## 2024-04-18 DIAGNOSIS — R42 DIZZY: ICD-10-CM

## 2024-04-18 DIAGNOSIS — G44.86 CERVICOGENIC HEADACHE: ICD-10-CM

## 2024-04-18 DIAGNOSIS — S06.0X0A CONCUSSION WITHOUT LOSS OF CONSCIOUSNESS, INITIAL ENCOUNTER: ICD-10-CM

## 2024-04-18 PROCEDURE — 97110 THERAPEUTIC EXERCISES: CPT | Mod: GP | Performed by: PHYSICAL THERAPIST

## 2024-04-18 ASSESSMENT — PAIN - FUNCTIONAL ASSESSMENT: PAIN_FUNCTIONAL_ASSESSMENT: 0-10

## 2024-04-18 NOTE — PROGRESS NOTES
"Physical Therapy    Physical Therapy Treatment    Patient Name: Rachelle Laurent  MRN: [id number]  Today's Date: 4/18/2024  Time Calculation  Start Time: 1115  Stop Time: 1200  Time Calculation (min): 45 min     PT Therapeutic Procedures Time Entry  Therapeutic Exercise Time Entry: 40     Visit number: 3  Insurance: Kenia  Authorized visits: 30  Authorization end date: 6/27/2024    Assessment:     Patient with improved understanding of how to use increased touch sense to decrease sensation of imbalance.    Plan:     Continue to work on decreased symptoms of concussion    Current Problem  1. Cervical strain, acute, initial encounter  Follow Up In Physical Therapy      2. Concussion without loss of consciousness, initial encounter  Follow Up In Physical Therapy      3. Cervicogenic headache  Follow Up In Physical Therapy      4. Dizzy  Follow Up In Physical Therapy        Subjective    Patient reports possible re concussion due to hitting her head, increased fog for a short period of time, light sensitivity, scuffing feet, slight dizzy with turning head, increased head floating with walking, noises increased agitation, little headache but at normal level, bending increased heavy head sensation, squatting has sensations of movement.      Pain  Pain Assessment  Pain Assessment: 0-10  Pain Score:  (TMJ left 2/10 earlier, right side head and neck 3/10)    Objective     Treatments:  Target  BPM     4/18/2024:  Squats with cues to feel feet to control balance and postural sway  Calf raises with cues to keep core activation 3 positions 10 times 1 set *   Eccentric calf raises 10 times 1 set *     Prior Visit  Lateral flexion stretch 3 times 30\" *   Levator scap 3 times 30\" *  Meghan with HR of 148 without symptoms of concussion and exertion rating of 19/20 so Meghan stopped.  Patient to work out at normal work out with exertion at hard as well as without concussion symptoms    SCOTT  Discussed continuing cervical " "stretching, stop \"B\", walk 30-45 min keep HR below 130 BPM    OP EDUCATION:       Goals:  Active       PT Problem       PT Goal 1:  Patient will report her headache frequency and severity 75% better       Start:  03/29/24    Expected End:  06/27/24            PT Goal 2:  Patient will increase cervical lateral flexion to 0-35 degrees to allow the patient to scan environment without pain.        Start:  03/29/24    Expected End:  06/27/24            PT Goal 3:  Patient decrease VOMS to 10 points to decrease her motion sensitivity.       Start:  03/29/24    Expected End:  06/27/24            PT Goal 4:  Patient will increase exercise tolerance to 20 min at 145 to allow for return to aerobic activity       Start:  03/29/24    Expected End:  06/27/24            PT Goal 5:  Patient will be independent in Home Exercise Program to manage symptoms and maximize their functional independence.        Start:  03/29/24    Expected End:  06/27/24            Patient Stated Goal 1:  Work out full strength, get rid on dizziness and help neck pain       Start:  03/29/24    Expected End:  06/27/24              "

## 2024-04-23 NOTE — PROGRESS NOTES
"Physical Therapy    Physical Therapy Treatment    Patient Name: Rachelle Laurent  MRN: [id number]  Today's Date: 4/25/2024              Visit number: 4   Insurance: [employer]  Authorized visits: 30  Authorization end date: 6/27/2024    Assessment:     Patient complaints of difficulty with vision at distance greater than arm length and not wearing glasses due to decreased tolerance.  Patient complaint is for tone on tone solid light colored stairs.     Plan:     Continue to work on     Current Problem  1. Cervical strain, acute, initial encounter  Follow Up In Physical Therapy      2. Concussion without loss of consciousness, initial encounter  Follow Up In Physical Therapy      3. Cervicogenic headache  Follow Up In Physical Therapy      4. Dizzy  Follow Up In Physical Therapy          Subjective    Patient reports that she is fatigued from recent trip to [facility]..  Patient reports that she is having trouble with light headed fuzzy foggy dizzy.  Patient reports difficulty judging edge of step.      Pain     Patient without complaints of pain.    Objective     Treatments:  Target  BPM    4/25/2024:  Patient encouraged to return to working out again.  Patient issued opticokinetic videos with report of poor tolerance.    4/18/2024:  Squats with cues to feel feet to control balance and postural sway  Calf raises with cues to keep core activation 3 positions 10 times 1 set *   Eccentric calf raises 10 times 1 set *     Prior Visit  Lateral flexion stretch 3 times 30\" *   Levator scap 3 times 30\" *  Meghan with HR of 148 without symptoms of concussion and exertion rating of 19/20 so Meghan stopped.  Patient to work out at normal work out with exertion at hard as well as without concussion symptoms    EVAL  Discussed continuing cervical stretching, stop \"B\", walk 30-45 min keep HR below 130 BPM    OP EDUCATION:       Goals:  Active       PT Problem       PT Goal 1:  Patient will report her headache frequency and " severity 75% better       Start:  03/29/24    Expected End:  06/27/24            PT Goal 2:  Patient will increase cervical lateral flexion to 0-35 degrees to allow the patient to scan environment without pain.        Start:  03/29/24    Expected End:  06/27/24            PT Goal 3:  Patient decrease VOMS to 10 points to decrease her motion sensitivity.       Start:  03/29/24    Expected End:  06/27/24            PT Goal 4:  Patient will increase exercise tolerance to 20 min at 145 to allow for return to aerobic activity       Start:  03/29/24    Expected End:  06/27/24            PT Goal 5:  Patient will be independent in Home Exercise Program to manage symptoms and maximize their functional independence.        Start:  03/29/24    Expected End:  06/27/24            Patient Stated Goal 1:  Work out full strength, get rid on dizziness and help neck pain       Start:  03/29/24    Expected End:  06/27/24

## 2024-04-25 ENCOUNTER — TREATMENT (OUTPATIENT)
Dept: PHYSICAL THERAPY | Facility: CLINIC | Age: 39
End: 2024-04-25
Payer: COMMERCIAL

## 2024-04-25 DIAGNOSIS — G44.86 CERVICOGENIC HEADACHE: ICD-10-CM

## 2024-04-25 DIAGNOSIS — S16.1XXA CERVICAL STRAIN, ACUTE, INITIAL ENCOUNTER: ICD-10-CM

## 2024-04-25 DIAGNOSIS — R42 DIZZY: ICD-10-CM

## 2024-04-25 DIAGNOSIS — S06.0X0A CONCUSSION WITHOUT LOSS OF CONSCIOUSNESS, INITIAL ENCOUNTER: ICD-10-CM

## 2024-04-25 PROCEDURE — 97110 THERAPEUTIC EXERCISES: CPT | Mod: GP | Performed by: PHYSICAL THERAPIST

## 2024-05-01 NOTE — PROGRESS NOTES
Physical Therapy    Physical Therapy Treatment    Patient Name: Rachelle Laurent  MRN: [id number]  Today's Date: 2024  Time Calculation  Start Time: 1115  Stop Time: 1200  Time Calculation (min): 45 min     PT Therapeutic Procedures Time Entry  Manual Therapy Time Entry: 40     Visit number: 5   Insurance: [employer]  Authorized visits: 30  Authorization end date: 2024  Primary Diagnosis: dizzy    Assessment:     Patient with decreased tone in cervical musculature    Plan:     Continue to monitor Concussion symptoms    Current Problem  1. Dizzy  Follow Up In Physical Therapy      2. Cervicogenic headache  Follow Up In Physical Therapy      3. Concussion without loss of consciousness, subsequent encounter        4. Cervical strain, acute, initial encounter  Follow Up In Physical Therapy      5. Concussion without loss of consciousness, initial encounter  Follow Up In Physical Therapy            Subjective    Patient reports that she is having headaches at end of day.   Patient states that she is getting headache after infusion.  Patient states that she has only been walking, her fit bit  and she is awaiting her new fit bit.  She has been working on power walking.  Patient continues to be light sensitive.  Screens are not bothering her this week as much.  Patient states [place] issues other than fatigue.    Pain     Patient without complaints of pain.    Objective     Treatments:  Target  BPM  2024:  Manual therapy: 40  MFR to posterior scalenes, semi spinalis.  Cross fiber to posterior scalenes and semispinalis.  Stripping to Middle scalenes bilaterally.    Discussed use of raquette balls for self trigger point release.    2024:  Patient encouraged to return to working out again.  Patient issued opticokinetic videos with report of poor tolerance.    2024:  Squats with cues to feel feet to control balance and postural sway  Calf raises with cues to keep core  "activation 3 positions 10 times 1 set *   Eccentric calf raises 10 times 1 set *     Prior Visit  Lateral flexion stretch 3 times 30\" *   Levator scap 3 times 30\" *  Meghan with HR of 148 without symptoms of concussion and exertion rating of 19/20 so Meghan stopped.  Patient to work out at normal work out with exertion at hard as well as without concussion symptoms    SCOTT  Discussed continuing cervical stretching, stop \"B\", walk 30-45 min keep HR below 130 BPM    OP EDUCATION:       Goals:  Active       PT Problem       PT Goal 1:  Patient will report her headache frequency and severity 75% better       Start:  03/29/24    Expected End:  06/27/24            PT Goal 2:  Patient will increase cervical lateral flexion to 0-35 degrees to allow the patient to scan environment without pain.        Start:  03/29/24    Expected End:  06/27/24            PT Goal 3:  Patient decrease VOMS to 10 points to decrease her motion sensitivity.       Start:  03/29/24    Expected End:  06/27/24            PT Goal 4:  Patient will increase exercise tolerance to 20 min at 145 to allow for return to aerobic activity       Start:  03/29/24    Expected End:  06/27/24            PT Goal 5:  Patient will be independent in Home Exercise Program to manage symptoms and maximize their functional independence.        Start:  03/29/24    Expected End:  06/27/24            Patient Stated Goal 1:  Work out full strength, get rid on dizziness and help neck pain       Start:  03/29/24    Expected End:  06/27/24                  "

## 2024-05-02 ENCOUNTER — TREATMENT (OUTPATIENT)
Dept: PHYSICAL THERAPY | Facility: CLINIC | Age: 39
End: 2024-05-02
Payer: COMMERCIAL

## 2024-05-02 DIAGNOSIS — G44.86 CERVICOGENIC HEADACHE: ICD-10-CM

## 2024-05-02 DIAGNOSIS — S06.0X0A CONCUSSION WITHOUT LOSS OF CONSCIOUSNESS, INITIAL ENCOUNTER: ICD-10-CM

## 2024-05-02 DIAGNOSIS — R42 DIZZY: Primary | ICD-10-CM

## 2024-05-02 DIAGNOSIS — S06.0X0D CONCUSSION WITHOUT LOSS OF CONSCIOUSNESS, SUBSEQUENT ENCOUNTER: ICD-10-CM

## 2024-05-02 DIAGNOSIS — S16.1XXA CERVICAL STRAIN, ACUTE, INITIAL ENCOUNTER: ICD-10-CM

## 2024-05-02 PROCEDURE — 97140 MANUAL THERAPY 1/> REGIONS: CPT | Mod: GP | Performed by: PHYSICAL THERAPIST

## 2024-05-08 NOTE — PROGRESS NOTES
Physical Therapy    Physical Therapy Treatment    Patient Name: Rachelle Laurent  MRN: [id number]  Today's Date: 5/9/2024              Visit number: 6   Insurance: [employer]  Authorized visits: 30  Authorization end date: 6/27/2024  Primary Diagnosis: dizzy    Assessment:     Patient without increased symptoms.  Patient with reports of symptoms at home with bending forward but not with diagonal fold overs a more complex task.  Patient with correction of looking down with her eyes and head not just head to improve posture and decrease cervicothoracic strain.  Patient with continued reports of motion sensitivity and decreased follow through with videos.    Plan:     Continue to monitor Concussion symptoms    Current Problem  1. Dizzy  Follow Up In Physical Therapy      2. Cervicogenic headache  Follow Up In Physical Therapy      3. Concussion without loss of consciousness, subsequent encounter        4. Cervical strain, acute, initial encounter  Follow Up In Physical Therapy      5. Concussion without loss of consciousness, initial encounter  Follow Up In Physical Therapy        Subjective    Patient states that her concussion symptoms have been pretty good, still hard to walk and looking down, up and down difficulty with balance.    Pain  Pain Assessment  Pain Assessment: 0-10  Pain Score:  (normal rainy day pain 3/10)    Objective     Treatments:  Target  BPM    5/9/2024:  Neuro 30  3/4 Tandem EO 30 seconds*  3/4 Tandem EO Horizontal head turns 10 times 1 set *   3/4 Tandem EO Vertical head turns 10 times 1 set *   3/4 Tandem EC 30 seconds *  3/4 Tandem EC  Horizontal head turns 10 times 1 set *   3/4 Tandem EC  Vertical head turns 10 times 1 set *     Floor to over head reach do faster to decrease    Jaime string no issues    Ball bounce up and down and side to side    Therapeutic exercises: 10 min   Upper trap   Levator scap  Posture in sitting and standing to decreased cervicothoracic stress and  "strain    5/2/2024:  Manual therapy: 40  MFR to posterior scalenes, semi spinalis.  Cross fiber to posterior scalenes and semispinalis.  Stripping to Middle scalenes bilaterally.    Discussed use of raquette balls for self trigger point release.    4/25/2024:  Patient encouraged to return to working out again.  Patient issued opticokinetic videos with report of poor tolerance.    4/18/2024:  Squats with cues to feel feet to control balance and postural sway  Calf raises with cues to keep core activation 3 positions 10 times 1 set *   Eccentric calf raises 10 times 1 set *     Prior Visit  Lateral flexion stretch 3 times 30\" *   Levator scap 3 times 30\" *  Meghan with HR of 148 without symptoms of concussion and exertion rating of 19/20 so Meghan stopped.  Patient to work out at normal work out with exertion at hard as well as without concussion symptoms    EVAL  Discussed continuing cervical stretching, stop \"B\", walk 30-45 min keep HR below 130 BPM    OP EDUCATION:       Goals:  Active       PT Problem       PT Goal 1:  Patient will report her headache frequency and severity 75% better       Start:  03/29/24    Expected End:  06/27/24            PT Goal 2:  Patient will increase cervical lateral flexion to 0-35 degrees to allow the patient to scan environment without pain.        Start:  03/29/24    Expected End:  06/27/24            PT Goal 3:  Patient decrease VOMS to 10 points to decrease her motion sensitivity.       Start:  03/29/24    Expected End:  06/27/24            PT Goal 4:  Patient will increase exercise tolerance to 20 min at 145 to allow for return to aerobic activity       Start:  03/29/24    Expected End:  06/27/24            PT Goal 5:  Patient will be independent in Home Exercise Program to manage symptoms and maximize their functional independence.        Start:  03/29/24    Expected End:  06/27/24            Patient Stated Goal 1:  Work out full strength, get rid on dizziness and help neck pain  "      Start:  03/29/24    Expected End:  06/27/24

## 2024-05-09 ENCOUNTER — TREATMENT (OUTPATIENT)
Dept: PHYSICAL THERAPY | Facility: CLINIC | Age: 39
End: 2024-05-09
Payer: COMMERCIAL

## 2024-05-09 DIAGNOSIS — S16.1XXA CERVICAL STRAIN, ACUTE, INITIAL ENCOUNTER: ICD-10-CM

## 2024-05-09 DIAGNOSIS — R42 DIZZY: Primary | ICD-10-CM

## 2024-05-09 DIAGNOSIS — G44.86 CERVICOGENIC HEADACHE: ICD-10-CM

## 2024-05-09 DIAGNOSIS — E03.8 SECONDARY HYPOTHYROIDISM: ICD-10-CM

## 2024-05-09 DIAGNOSIS — S06.0X0D CONCUSSION WITHOUT LOSS OF CONSCIOUSNESS, SUBSEQUENT ENCOUNTER: ICD-10-CM

## 2024-05-09 DIAGNOSIS — S06.0X0A CONCUSSION WITHOUT LOSS OF CONSCIOUSNESS, INITIAL ENCOUNTER: ICD-10-CM

## 2024-05-09 PROCEDURE — 97110 THERAPEUTIC EXERCISES: CPT | Mod: GP | Performed by: PHYSICAL THERAPIST

## 2024-05-09 PROCEDURE — 97112 NEUROMUSCULAR REEDUCATION: CPT | Mod: GP | Performed by: PHYSICAL THERAPIST

## 2024-05-09 RX ORDER — LEVOTHYROXINE SODIUM 100 UG/1
100 CAPSULE ORAL
Qty: 30 CAPSULE | Refills: 8 | Status: SHIPPED | OUTPATIENT
Start: 2024-05-09 | End: 2024-05-09 | Stop reason: SDUPTHER

## 2024-05-09 RX ORDER — ERGOCALCIFEROL 1.25 MG/1
1 CAPSULE ORAL
COMMUNITY
Start: 2024-04-26

## 2024-05-09 RX ORDER — LEVOTHYROXINE SODIUM 100 UG/1
100 CAPSULE ORAL
Qty: 30 CAPSULE | Refills: 3 | Status: SHIPPED | OUTPATIENT
Start: 2024-05-09

## 2024-05-09 RX ORDER — FERROUS GLUCONATE 324(37.5)
TABLET ORAL
COMMUNITY
Start: 2024-05-04

## 2024-05-09 ASSESSMENT — PAIN - FUNCTIONAL ASSESSMENT: PAIN_FUNCTIONAL_ASSESSMENT: 0-10

## 2024-05-15 NOTE — PROGRESS NOTES
Physical Therapy    Physical Therapy Treatment    Patient Name: Rachelle Laurent  MRN: [id number]  Today's Date: 5/16/2024  Time Calculation  Start Time: 1115  Stop Time: 1215  Time Calculation (min): 60 min     PT Therapeutic Procedures Time Entry  Therapeutic Exercise Time Entry: 55     Visit number: 7   Insurance: [employer]  Authorized visits: 30  Authorization end date: 6/27/2024  Primary Diagnosis: dizzy    Assessment:     Patient with good control of her ankle strategies this date with use of hip strategies for balance.      Plan:     Work on hopping and jumping    Current Problem  1. Dizzy  Follow Up In Physical Therapy      2. Concussion without loss of consciousness, subsequent encounter        3. Cervicogenic headache  Follow Up In Physical Therapy      4. Cervical strain, acute, initial encounter  Follow Up In Physical Therapy      5. Concussion without loss of consciousness, initial encounter  Follow Up In Physical Therapy          Subjective    Patient states she feel foggy today.  Patient without change is symptoms with cervical stretching.    Pain  Pain Assessment  Pain Assessment: 0-10  Pain Score:  (1/10 side of head)    Objective     Treatments:  Target  BPM  5/16/2024:  Neuro 60 min  Black trainers 1/2 tandem EO/EC  BOSU step up and lateral step over   Core planks, table top posture correction and core activation correction    5/9/2024:  Neuro 30  3/4 Tandem EO 30 seconds*  3/4 Tandem EO Horizontal head turns 10 times 1 set *   3/4 Tandem EO Vertical head turns 10 times 1 set *   3/4 Tandem EC 30 seconds *  3/4 Tandem EC  Horizontal head turns 10 times 1 set *   3/4 Tandem EC  Vertical head turns 10 times 1 set *     Floor to over head reach do faster to decrease    Jaime string no issues    Ball bounce up and down and side to side    Therapeutic exercises: 10 min   Upper trap   Levator scap  Posture in sitting and standing to decreased cervicothoracic stress and strain    5/2/2024:  Manual  "therapy: 40  MFR to posterior scalenes, semi spinalis.  Cross fiber to posterior scalenes and semispinalis.  Stripping to Middle scalenes bilaterally.    Discussed use of raquette balls for self trigger point release.    4/25/2024:  Patient encouraged to return to working out again.  Patient issued opticokinetic videos with report of poor tolerance.    4/18/2024:  Squats with cues to feel feet to control balance and postural sway  Calf raises with cues to keep core activation 3 positions 10 times 1 set *   Eccentric calf raises 10 times 1 set *     Prior Visit  Lateral flexion stretch 3 times 30\" *   Levator scap 3 times 30\" *  Meghan with HR of 148 without symptoms of concussion and exertion rating of 19/20 so Meghan stopped.  Patient to work out at normal work out with exertion at hard as well as without concussion symptoms    EVAL  Discussed continuing cervical stretching, stop \"B\", walk 30-45 min keep HR below 130 BPM    OP EDUCATION:       Goals:  Active       PT Problem       PT Goal 1:  Patient will report her headache frequency and severity 75% better       Start:  03/29/24    Expected End:  06/27/24            PT Goal 2:  Patient will increase cervical lateral flexion to 0-35 degrees to allow the patient to scan environment without pain.        Start:  03/29/24    Expected End:  06/27/24            PT Goal 3:  Patient decrease VOMS to 10 points to decrease her motion sensitivity.       Start:  03/29/24    Expected End:  06/27/24            PT Goal 4:  Patient will increase exercise tolerance to 20 min at 145 to allow for return to aerobic activity       Start:  03/29/24    Expected End:  06/27/24            PT Goal 5:  Patient will be independent in Home Exercise Program to manage symptoms and maximize their functional independence.        Start:  03/29/24    Expected End:  06/27/24            Patient Stated Goal 1:  Work out full strength, get rid on dizziness and help neck pain       Start:  03/29/24    " Expected End:  06/27/24

## 2024-05-16 ENCOUNTER — TREATMENT (OUTPATIENT)
Dept: PHYSICAL THERAPY | Facility: CLINIC | Age: 39
End: 2024-05-16
Payer: COMMERCIAL

## 2024-05-16 DIAGNOSIS — G44.86 CERVICOGENIC HEADACHE: ICD-10-CM

## 2024-05-16 DIAGNOSIS — S06.0X0D CONCUSSION WITHOUT LOSS OF CONSCIOUSNESS, SUBSEQUENT ENCOUNTER: ICD-10-CM

## 2024-05-16 DIAGNOSIS — S06.0X0A CONCUSSION WITHOUT LOSS OF CONSCIOUSNESS, INITIAL ENCOUNTER: ICD-10-CM

## 2024-05-16 DIAGNOSIS — S16.1XXA CERVICAL STRAIN, ACUTE, INITIAL ENCOUNTER: ICD-10-CM

## 2024-05-16 DIAGNOSIS — R42 DIZZY: Primary | ICD-10-CM

## 2024-05-16 PROCEDURE — 97110 THERAPEUTIC EXERCISES: CPT | Mod: GP | Performed by: PHYSICAL THERAPIST

## 2024-05-16 ASSESSMENT — PAIN - FUNCTIONAL ASSESSMENT: PAIN_FUNCTIONAL_ASSESSMENT: 0-10

## 2024-05-22 NOTE — PROGRESS NOTES
Physical Therapy    Physical Therapy Treatment    Patient Name: Rachelle Laurent  MRN: [id number]  Today's Date: 5/23/2024  Time Calculation  Start Time: 1115  Stop Time: 1200  Time Calculation (min): 45 min     PT Therapeutic Procedures Time Entry  Therapeutic Exercise Time Entry: 40     Visit number: 8   Insurance: [employer]  Authorized visits: 30  Authorization end date: 6/27/2024  Primary Diagnosis: dizzy    Assessment:     Patient with     Plan:     Work on hopping and jumping    Current Problem  1. Dizzy  Follow Up In Physical Therapy      2. Concussion without loss of consciousness, subsequent encounter        3. Cervicogenic headache  Follow Up In Physical Therapy      4. Cervical strain, acute, initial encounter  Follow Up In Physical Therapy      5. Concussion without loss of consciousness, initial encounter  Follow Up In Physical Therapy            Subjective    Patient states she is worried about getting another concussion from getting hit in head by the dog.  Patient states that she hurt her back and neck do to muscle and discussed posture with stretching, but it may also be from doing yard work.    Pain  Pain Assessment  Pain Assessment: 0-10  Pain Score:  (neck pain 2-3/10 headache 1/10)    Objective     Treatments:  Target  BPM  5/23/2024:  Patient education at she need to do warm up elevate her HR to Target Zone and keep it in her target area.  HR up and down can cause dizziness especially with heat and not exercising consistently.    Difficulty with screens may be related to vision posture and continued fear about even the least little head bump is another concussion.    Patient continued to have increased pain in neck at night due to bed and pillows.    5/16/2024:  Neuro 60 min  Black trainers 1/2 tandem EO/EC  BOSU step up and lateral step over   Core planks, table top posture correction and core activation correction    5/9/2024:  Neuro 30  3/4 Tandem EO 30 seconds*  3/4 Tandem EO Horizontal  "head turns 10 times 1 set *   3/4 Tandem EO Vertical head turns 10 times 1 set *   3/4 Tandem EC 30 seconds *  3/4 Tandem EC  Horizontal head turns 10 times 1 set *   3/4 Tandem EC  Vertical head turns 10 times 1 set *     Floor to over head reach do faster to decrease    Jaime string no issues    Ball bounce up and down and side to side    Therapeutic exercises: 10 min   Upper trap   Levator scap  Posture in sitting and standing to decreased cervicothoracic stress and strain    5/2/2024:  Manual therapy: 40  MFR to posterior scalenes, semi spinalis.  Cross fiber to posterior scalenes and semispinalis.  Stripping to Middle scalenes bilaterally.    Discussed use of raquette balls for self trigger point release.    4/25/2024:  Patient encouraged to return to working out again.  Patient issued opticokinetic videos with report of poor tolerance.    4/18/2024:  Squats with cues to feel feet to control balance and postural sway  Calf raises with cues to keep core activation 3 positions 10 times 1 set *   Eccentric calf raises 10 times 1 set *     Prior Visit  Lateral flexion stretch 3 times 30\" *   Levator scap 3 times 30\" *  Meghan with HR of 148 without symptoms of concussion and exertion rating of 19/20 so Meghan stopped.  Patient to work out at normal work out with exertion at hard as well as without concussion symptoms    SCOTT  Discussed continuing cervical stretching, stop \"B\", walk 30-45 min keep HR below 130 BPM    OP EDUCATION:       Goals:  Active       PT Problem       PT Goal 1:  Patient will report her headache frequency and severity 75% better       Start:  03/29/24    Expected End:  06/27/24            PT Goal 2:  Patient will increase cervical lateral flexion to 0-35 degrees to allow the patient to scan environment without pain.        Start:  03/29/24    Expected End:  06/27/24            PT Goal 3:  Patient decrease VOMS to 10 points to decrease her motion sensitivity.       Start:  03/29/24    Expected " End:  06/27/24            PT Goal 4:  Patient will increase exercise tolerance to 20 min at 145 to allow for return to aerobic activity       Start:  03/29/24    Expected End:  06/27/24            PT Goal 5:  Patient will be independent in Home Exercise Program to manage symptoms and maximize their functional independence.        Start:  03/29/24    Expected End:  06/27/24            Patient Stated Goal 1:  Work out full strength, get rid on dizziness and help neck pain       Start:  03/29/24    Expected End:  06/27/24

## 2024-05-23 ENCOUNTER — TREATMENT (OUTPATIENT)
Dept: PHYSICAL THERAPY | Facility: CLINIC | Age: 39
End: 2024-05-23
Payer: COMMERCIAL

## 2024-05-23 DIAGNOSIS — S06.0X0D CONCUSSION WITHOUT LOSS OF CONSCIOUSNESS, SUBSEQUENT ENCOUNTER: ICD-10-CM

## 2024-05-23 DIAGNOSIS — S16.1XXA CERVICAL STRAIN, ACUTE, INITIAL ENCOUNTER: ICD-10-CM

## 2024-05-23 DIAGNOSIS — S06.0X0A CONCUSSION WITHOUT LOSS OF CONSCIOUSNESS, INITIAL ENCOUNTER: ICD-10-CM

## 2024-05-23 DIAGNOSIS — G44.86 CERVICOGENIC HEADACHE: ICD-10-CM

## 2024-05-23 DIAGNOSIS — R42 DIZZY: Primary | ICD-10-CM

## 2024-05-23 PROCEDURE — 97110 THERAPEUTIC EXERCISES: CPT | Mod: GP | Performed by: PHYSICAL THERAPIST

## 2024-05-23 ASSESSMENT — PAIN - FUNCTIONAL ASSESSMENT: PAIN_FUNCTIONAL_ASSESSMENT: 0-10

## 2024-05-30 ENCOUNTER — TREATMENT (OUTPATIENT)
Dept: PHYSICAL THERAPY | Facility: CLINIC | Age: 39
End: 2024-05-30
Payer: COMMERCIAL

## 2024-05-30 DIAGNOSIS — S06.0X0D CONCUSSION WITHOUT LOSS OF CONSCIOUSNESS, SUBSEQUENT ENCOUNTER: Primary | ICD-10-CM

## 2024-05-30 DIAGNOSIS — G44.86 CERVICOGENIC HEADACHE: ICD-10-CM

## 2024-05-30 DIAGNOSIS — R42 DIZZY: ICD-10-CM

## 2024-05-30 PROCEDURE — 97140 MANUAL THERAPY 1/> REGIONS: CPT | Mod: GP | Performed by: PHYSICAL THERAPIST

## 2024-05-30 ASSESSMENT — PAIN - FUNCTIONAL ASSESSMENT: PAIN_FUNCTIONAL_ASSESSMENT: 0-10

## 2024-05-30 NOTE — PROGRESS NOTES
Physical Therapy    Physical Therapy Treatment    Patient Name: Rachelle Laurent  MRN: [id number]  Today's Date: 5/30/2024  Time Calculation  Start Time: 1115  Stop Time: 1200  Time Calculation (min): 45 min     PT Therapeutic Procedures Time Entry  Manual Therapy Time Entry: 40     Visit number: 9  Insurance: [employer]  Authorized visits: 30  Authorization end date: 6/27/2024  Primary Diagnosis: dizzy    Assessment:     Patient with improving symptoms and increased tolerance tolerance of activity    Plan:     Probable discharge  Current Problem  1. Concussion without loss of consciousness, subsequent encounter        2. Cervicogenic headache  Follow Up In Physical Therapy      3. Dizzy  Follow Up In Physical Therapy              Subjective    Patient states she was able to get her HR up multiple work outs.  Patient states that she was able to get her HR up 157 without issues.  Patient found that she did not have dizziness     Pain  Pain Assessment  Pain Assessment: 0-10  Pain Score:  (1/10 right side of central spine cervical thoracic region.)    Objective     Treatments:  Target  BPM  5/30/2024:  Manual therapy: 40 min  Cross fiber to semispinalis, rhomboids, middle trap and levator scap    5/23/2024:  Patient education at she need to do warm up elevate her HR to Target Zone and keep it in her target area.  HR up and down can cause dizziness especially with heat and not exercising consistently.    Difficulty with screens may be related to vision posture and continued fear about even the least little head bump is another concussion.    Patient continued to have increased pain in neck at night due to bed and pillows.    5/16/2024:  Neuro 60 min  Black trainers 1/2 tandem EO/EC  BOSU step up and lateral step over   Core planks, table top posture correction and core activation correction    5/9/2024:  Neuro 30  3/4 Tandem EO 30 seconds*  3/4 Tandem EO Horizontal head turns 10 times 1 set *   3/4 Tandem EO Vertical  "head turns 10 times 1 set *   3/4 Tandem EC 30 seconds *  3/4 Tandem EC  Horizontal head turns 10 times 1 set *   3/4 Tandem EC  Vertical head turns 10 times 1 set *     Floor to over head reach do faster to decrease    Jaime string no issues    Ball bounce up and down and side to side    Therapeutic exercises: 10 min   Upper trap   Levator scap  Posture in sitting and standing to decreased cervicothoracic stress and strain    5/2/2024:  Manual therapy: 40  MFR to posterior scalenes, semi spinalis.  Cross fiber to posterior scalenes and semispinalis.  Stripping to Middle scalenes bilaterally.    Discussed use of raquette balls for self trigger point release.    4/25/2024:  Patient encouraged to return to working out again.  Patient issued opticokinetic videos with report of poor tolerance.    4/18/2024:  Squats with cues to feel feet to control balance and postural sway  Calf raises with cues to keep core activation 3 positions 10 times 1 set *   Eccentric calf raises 10 times 1 set *     Prior Visit  Lateral flexion stretch 3 times 30\" *   Levator scap 3 times 30\" *  Meghan with HR of 148 without symptoms of concussion and exertion rating of 19/20 so Meghan stopped.  Patient to work out at normal work out with exertion at hard as well as without concussion symptoms    SCOTT  Discussed continuing cervical stretching, stop \"B\", walk 30-45 min keep HR below 130 BPM    OP EDUCATION:       Goals:  Active       PT Problem       PT Goal 1:  Patient will report her headache frequency and severity 75% better       Start:  03/29/24    Expected End:  06/27/24            PT Goal 2:  Patient will increase cervical lateral flexion to 0-35 degrees to allow the patient to scan environment without pain.        Start:  03/29/24    Expected End:  06/27/24            PT Goal 3:  Patient decrease VOMS to 10 points to decrease her motion sensitivity.       Start:  03/29/24    Expected End:  06/27/24            PT Goal 4:  Patient will " increase exercise tolerance to 20 min at 145 to allow for return to aerobic activity       Start:  03/29/24    Expected End:  06/27/24            PT Goal 5:  Patient will be independent in Home Exercise Program to manage symptoms and maximize their functional independence.        Start:  03/29/24    Expected End:  06/27/24            Patient Stated Goal 1:  Work out full strength, get rid on dizziness and help neck pain       Start:  03/29/24    Expected End:  06/27/24

## 2024-06-06 ENCOUNTER — TREATMENT (OUTPATIENT)
Dept: PHYSICAL THERAPY | Facility: CLINIC | Age: 39
End: 2024-06-06
Payer: COMMERCIAL

## 2024-06-06 DIAGNOSIS — G44.86 CERVICOGENIC HEADACHE: ICD-10-CM

## 2024-06-06 DIAGNOSIS — S06.0X0D CONCUSSION WITHOUT LOSS OF CONSCIOUSNESS, SUBSEQUENT ENCOUNTER: ICD-10-CM

## 2024-06-06 DIAGNOSIS — R42 DIZZY: Primary | ICD-10-CM

## 2024-06-06 PROCEDURE — 97110 THERAPEUTIC EXERCISES: CPT | Mod: GP | Performed by: PHYSICAL THERAPIST

## 2024-06-06 ASSESSMENT — PAIN SCALES - GENERAL: PAINLEVEL_OUTOF10: 0 - NO PAIN

## 2024-06-06 ASSESSMENT — PAIN - FUNCTIONAL ASSESSMENT: PAIN_FUNCTIONAL_ASSESSMENT: 0-10

## 2024-06-06 NOTE — PROGRESS NOTES
Physical Therapy    Physical Therapy Treatment/discharge    Patient Name: Rachelle Laurent  MRN: [id number]  Today's Date: 6/6/2024        PT Therapeutic Procedures Time Entry  Therapeutic Exercise Time Entry: 40     Visit number: 10  Insurance: [employer]  Authorized visits: 30  Authorization end date: 6/27/2024  Primary Diagnosis: dizzy    Assessment:     Patient is back to her base line.  Patient continues to have dizziness at time with head looking up as well as neck pain which is related to her long standing cervical issues.  Patient is able to exercise at HR >150 BPM but is having difficulty reaching those levels due to preferring to be outside for exercise with the nice weather.    Plan:     discharge    Current Problem  1. Dizzy  Follow Up In Physical Therapy      2. Concussion without loss of consciousness, subsequent encounter        3. Cervicogenic headache  Follow Up In Physical Therapy          Subjective    Patient states that she had a migraine on Saturday which she attributes to working too hard on Friday.    Pain  Pain Assessment  Pain Assessment: 0-10  Pain Score: 0 - No pain    Objective     Treatments:  6/6/2024:  Jump and hop forward back anne and lateral 10 times 1 set *   Patient education that her left ankle appears to be weaker than her right at this time.  Patient report compliance with eccentric calf raises but is unable to land on toes of left leg instead landing flat footed    5/30/2024:  Manual therapy: 40 min  Cross fiber to semispinalis, rhomboids, middle trap and levator scap    5/23/2024:  Patient education at she need to do warm up elevate her HR to Target Zone and keep it in her target area.  HR up and down can cause dizziness especially with heat and not exercising consistently.    Difficulty with screens may be related to vision posture and continued fear about even the least little head bump is another concussion.    Patient continued to have increased pain in neck at night due to  "bed and pillows.    5/16/2024:  Neuro 60 min  Black trainers 1/2 tandem EO/EC  BOSU step up and lateral step over   Core planks, table top posture correction and core activation correction    5/9/2024:  Neuro 30  3/4 Tandem EO 30 seconds*  3/4 Tandem EO Horizontal head turns 10 times 1 set *   3/4 Tandem EO Vertical head turns 10 times 1 set *   3/4 Tandem EC 30 seconds *  3/4 Tandem EC  Horizontal head turns 10 times 1 set *   3/4 Tandem EC  Vertical head turns 10 times 1 set *     Floor to over head reach do faster to decrease    Jaime string no issues    Ball bounce up and down and side to side    Therapeutic exercises: 10 min   Upper trap   Levator scap  Posture in sitting and standing to decreased cervicothoracic stress and strain    5/2/2024:  Manual therapy: 40  MFR to posterior scalenes, semi spinalis.  Cross fiber to posterior scalenes and semispinalis.  Stripping to Middle scalenes bilaterally.    Discussed use of raquette balls for self trigger point release.    4/25/2024:  Patient encouraged to return to working out again.  Patient issued opticokinetic videos with report of poor tolerance.    4/18/2024:  Squats with cues to feel feet to control balance and postural sway  Calf raises with cues to keep core activation 3 positions 10 times 1 set *   Eccentric calf raises 10 times 1 set *     Prior Visit  Lateral flexion stretch 3 times 30\" *   Levator scap 3 times 30\" *  Meghan with HR of 148 without symptoms of concussion and exertion rating of 19/20 so Meghan stopped.  Patient to work out at normal work out with exertion at hard as well as without concussion symptoms    SCOTT  Discussed continuing cervical stretching, stop \"B\", walk 30-45 min keep HR below 130 BPM    OP EDUCATION:       Goals:  Active       PT Problem       PT Goal 1:  Patient will report her headache frequency and severity 75% better (Met)       Start:  03/29/24    Expected End:  06/27/24    Resolved:  06/06/24      Goal Note       Patient " report 97% back to her normal              PT Goal 2:  Patient will increase cervical lateral flexion to 0-35 degrees to allow the patient to scan environment without pain.  (Met)       Start:  03/29/24    Expected End:  06/27/24    Resolved:  06/06/24      Goal Note       Right 35, left 40              PT Goal 3:  Patient decrease VOMS to 10 points to decrease her motion sensitivity. (Met)       Start:  03/29/24    Expected End:  06/27/24    Resolved:  06/06/24         PT Goal 4:  Patient will increase exercise tolerance to 20 min at 145 to allow for return to aerobic activity (Progressing)       Start:  03/29/24    Expected End:  06/27/24         Goal Note       Patient >20 min with 125-135 due to not able to walk fast enough to get HR.              PT Goal 5:  Patient will be independent in Home Exercise Program to manage symptoms and maximize their functional independence.  (Met)       Start:  03/29/24    Expected End:  06/27/24    Resolved:  06/06/24         Patient Stated Goal 1:  Work out full strength, get rid on dizziness and help neck pain (Met)       Start:  03/29/24    Expected End:  06/27/24    Resolved:  06/06/24      Goal Note       Patient reports she has not returned to normal work out(walk and hike normal just not HIT training, dizziness back to her normal, neck pain back to base line.

## 2024-06-20 DIAGNOSIS — E61.1 IRON DEFICIENCY: Primary | ICD-10-CM

## 2024-06-20 DIAGNOSIS — E55.9 VITAMIN D DEFICIENCY: ICD-10-CM

## 2024-06-24 ENCOUNTER — LAB (OUTPATIENT)
Dept: LAB | Facility: LAB | Age: 39
End: 2024-06-24
Payer: COMMERCIAL

## 2024-06-24 DIAGNOSIS — E61.1 IRON DEFICIENCY: ICD-10-CM

## 2024-06-24 DIAGNOSIS — E55.9 VITAMIN D DEFICIENCY: ICD-10-CM

## 2024-06-24 LAB
25(OH)D3 SERPL-MCNC: 32 NG/ML (ref 30–100)
BASOPHILS # BLD AUTO: 0.05 X10*3/UL (ref 0–0.1)
BASOPHILS NFR BLD AUTO: 0.8 %
EOSINOPHIL # BLD AUTO: 0.02 X10*3/UL (ref 0–0.7)
EOSINOPHIL NFR BLD AUTO: 0.3 %
ERYTHROCYTE [DISTWIDTH] IN BLOOD BY AUTOMATED COUNT: 13.4 % (ref 11.5–14.5)
HCT VFR BLD AUTO: 37.9 % (ref 36–46)
HGB BLD-MCNC: 12.2 G/DL (ref 12–16)
IMM GRANULOCYTES # BLD AUTO: 0.02 X10*3/UL (ref 0–0.7)
IMM GRANULOCYTES NFR BLD AUTO: 0.3 % (ref 0–0.9)
IRON SATN MFR SERPL: 18 % (ref 25–45)
IRON SERPL-MCNC: 68 UG/DL (ref 35–150)
LYMPHOCYTES # BLD AUTO: 2.22 X10*3/UL (ref 1.2–4.8)
LYMPHOCYTES NFR BLD AUTO: 34.6 %
MCH RBC QN AUTO: 28.4 PG (ref 26–34)
MCHC RBC AUTO-ENTMCNC: 32.2 G/DL (ref 32–36)
MCV RBC AUTO: 88 FL (ref 80–100)
MONOCYTES # BLD AUTO: 0.58 X10*3/UL (ref 0.1–1)
MONOCYTES NFR BLD AUTO: 9 %
NEUTROPHILS # BLD AUTO: 3.52 X10*3/UL (ref 1.2–7.7)
NEUTROPHILS NFR BLD AUTO: 55 %
NRBC BLD-RTO: 0 /100 WBCS (ref 0–0)
PLATELET # BLD AUTO: 269 X10*3/UL (ref 150–450)
RBC # BLD AUTO: 4.3 X10*6/UL (ref 4–5.2)
TIBC SERPL-MCNC: 385 UG/DL (ref 240–445)
TRANSFERRIN SERPL-MCNC: 286 MG/DL (ref 200–360)
UIBC SERPL-MCNC: 317 UG/DL (ref 110–370)
WBC # BLD AUTO: 6.4 X10*3/UL (ref 4.4–11.3)

## 2024-06-24 PROCEDURE — 82306 VITAMIN D 25 HYDROXY: CPT

## 2024-06-24 PROCEDURE — 84466 ASSAY OF TRANSFERRIN: CPT

## 2024-06-24 PROCEDURE — 85025 COMPLETE CBC W/AUTO DIFF WBC: CPT

## 2024-06-24 PROCEDURE — 36415 COLL VENOUS BLD VENIPUNCTURE: CPT

## 2024-06-24 PROCEDURE — 83540 ASSAY OF IRON: CPT

## 2024-06-24 PROCEDURE — 83550 IRON BINDING TEST: CPT

## 2024-07-02 DIAGNOSIS — E55.9 VITAMIN D DEFICIENCY: ICD-10-CM

## 2024-07-02 DIAGNOSIS — D64.9 ANEMIA, UNSPECIFIED TYPE: Primary | ICD-10-CM

## 2024-07-10 ENCOUNTER — PROCEDURE VISIT (OUTPATIENT)
Dept: PSYCHOLOGY | Facility: CLINIC | Age: 39
End: 2024-07-10
Payer: COMMERCIAL

## 2024-07-10 DIAGNOSIS — F07.81 POST CONCUSSIVE SYNDROME: Primary | ICD-10-CM

## 2024-07-10 DIAGNOSIS — Z87.820 HISTORY OF MULTIPLE CONCUSSIONS: ICD-10-CM

## 2024-07-10 DIAGNOSIS — R42 DIZZINESS: ICD-10-CM

## 2024-07-10 DIAGNOSIS — H81.10 BENIGN PAROXYSMAL POSITIONAL VERTIGO, UNSPECIFIED LATERALITY: ICD-10-CM

## 2024-07-10 DIAGNOSIS — G43.809 OTHER MIGRAINE WITHOUT STATUS MIGRAINOSUS, NOT INTRACTABLE: ICD-10-CM

## 2024-07-10 DIAGNOSIS — F41.9 ANXIETY: ICD-10-CM

## 2024-07-10 PROCEDURE — 96133 NRPSYC TST EVAL PHYS/QHP EA: CPT | Mod: AH | Performed by: CLINICAL NEUROPSYCHOLOGIST

## 2024-07-10 PROCEDURE — 96133 NRPSYC TST EVAL PHYS/QHP EA: CPT | Performed by: CLINICAL NEUROPSYCHOLOGIST

## 2024-07-10 PROCEDURE — 96139 PSYCL/NRPSYC TST TECH EA: CPT | Performed by: CLINICAL NEUROPSYCHOLOGIST

## 2024-07-10 PROCEDURE — 96116 NUBHVL XM PHYS/QHP 1ST HR: CPT | Performed by: CLINICAL NEUROPSYCHOLOGIST

## 2024-07-10 PROCEDURE — 96138 PSYCL/NRPSYC TECH 1ST: CPT | Performed by: CLINICAL NEUROPSYCHOLOGIST

## 2024-07-10 PROCEDURE — 96132 NRPSYC TST EVAL PHYS/QHP 1ST: CPT | Performed by: CLINICAL NEUROPSYCHOLOGIST

## 2024-07-10 PROCEDURE — 96116 NUBHVL XM PHYS/QHP 1ST HR: CPT | Mod: AH | Performed by: CLINICAL NEUROPSYCHOLOGIST

## 2024-07-10 PROCEDURE — 96138 PSYCL/NRPSYC TECH 1ST: CPT | Mod: AH | Performed by: CLINICAL NEUROPSYCHOLOGIST

## 2024-07-10 PROCEDURE — 96139 PSYCL/NRPSYC TST TECH EA: CPT | Mod: AH | Performed by: CLINICAL NEUROPSYCHOLOGIST

## 2024-07-10 PROCEDURE — 96132 NRPSYC TST EVAL PHYS/QHP 1ST: CPT | Mod: AH | Performed by: CLINICAL NEUROPSYCHOLOGIST

## 2024-07-10 NOTE — PROGRESS NOTES
NEUROPSYCHOLOGICAL DATA SUMMARY    Name: Rachelle Laurent   : 1985   MRN: [id number]     Date of Service: 7/10/2024     Age: 38 y.o.   Race: White  Education: 18  Preferred Hand: RH  Examiner: Andrew Miles, PhD, [facility]  Technician: Alva Arguelles MA    Descriptors:     T-Score Standard Score Z-Score Scaled Score %ile Rank   Exceptionally High > 70 > 130 > 2.0 > 16 > 98   Above Average 64-69 120-129 1.4-1.9 15 91-97   High Average 57-63 110-119 0.7-1.3 12-14 75-90   Average 43-56  0.6 to -0.6 8-11 25-74   Low Average 37-42 80-89 -1.3 to -0.7 6-7 9-24   Below Average 30-36 70-79 -2.0 to -1.4 4-5 2-8   Exceptionally Low < 30 < 70 < -2.0 < 4 < 2       WTAR 1 Raw  Std. Sc  %ile   Total Correct  46  119  90   FSIQ-Est  -  118  88     Wechsler Adult Intelligence Scale - IV 1  Percep SS  %ile   Matrix Reasoning 10  50   Wrking SS  %ile   Digit Span 7  16   Letter Number Sequencing 9  37   ProcSpd SS  %ile   Symbol Search 15  95   Coding 14  91   Quotient Index  %ile   Working Memory Index 89  23   Processing Speed index 124  95      Raw  T  %ile   Trail Making 2 Part A 22 (0) 47  39       Time (Errors) Part B 59 (0) 42  21     HVLT-R 1 (Form 1)                Raw             T       %ile  Trial 1 7  47  39   Trial 2 11  57  77   Trial 3 12  59  82   Total Recall (Sum of T1 - T3) 30  54  66   Learning [(Best of T2 or T3) - T1] 5       Trial 4 (Delayed Recall) 11  54  66   % Retained   [T4 / (Best of T2orT3)] x 100   92    50    50   T+  12  See Discrim. Index   F+ 0  See Discrim. Index   Discrimination Index (T+ - F+) 12  58  79      BVMT-R 1 (Form 1)            Raw    T         %ile  Trial 1 9  62  88   Trial 2 11  60  84   Trial 3 12  62  88   Total Recall (Sum of T1 - T3) 32  62  88   Learning [(Best of T2 or T3) - T1] 3  46  34   Delayed Recall  12  63  90   T+  (F+) 6 (0)  See Discrim. Index   Discrimination Index (T+ - F+) 6       Copy Score (Optional) -          Raw       TOMM 1 49       Dots 1  15  8.66 v. 4.16  2e   RDS 1 7  RDS-R  11      Raw  T  %ile   BDI-II 1              Total 7       STAI 1                                              State 46  59  82     Trait 52  67  93     Post Concussive Scale    (Pre) 9(9)                                           (Post) 11(10)       Test Normative References:  Test Manual  IRMA Boyer., IRMA Boyer, & Psychological Assessment Resources[employer] Inc. (2004). Revised comprehensive norms for an expanded Rowland-Reitan battery: Demographically adjusted neuropsychological norms for  and  adults, professional manual. Tali Fla: Psychological Assessment Resources

## 2024-07-10 NOTE — PROGRESS NOTES
Neuropsychology Evaluation    Name: Rachelle Laurent  : 1985  MRN: [id number]  Referring Provider: No ref. provider found  Date of Service: 07/10/24     Reason for Referral: Rachelle Laurent was referred for neuropsychological evaluation given a history of concussion and potential persisting concussion symptoms, including cognitive disturbance. The examiner explained the rationale for the evaluation and written informed consent was obtained.     Final Diagnosis:   History of multiple concussions (ICD-10 #  Dizziness and positional vertigo (ICD-10 #  Migraine (ICD-10 #  Anxiety (ICD-10 #F41.9)    Summary/Impressions: Ms. Laurent was referred for evaluation given a history of multiple concussions and potential persisting symptoms, with her most recent injury being in 2024.  At the time of the current evaluation, the patient reported significant improvement over time in her symptom endorsement but described subtle residual and some difficulties with tolerating physical activity and electronic media use (e.g. reported possible difficulties with extended computer use during a job search and has had difficulties with exercise).  Neither of those activities were described as consistently leading to symptom exacerbation.  Additional persisting symptoms include difficulties tolerating certain postures without dizziness, increased frequency of migraine, visual disturbance, etc.  Many of these residual symptoms were noted to be present to some degree at her baseline and having been longstanding (at least since , possibly before). The patient has been followed by neurology at [facility] and recently was referred for additional vestibular PT; however, she was discharged from vestibular PT at  by a specialist in  at which time vestibular symptoms were not identified as significantly contributing to residual symptoms.  On testing, neurocognitive performance showed subtle working memory inefficiency and  general preference for visual attention/visual learning but all scores were at least intact and many were quite strong.  Particular strengths were observed on visual tasks and tasks that required visual scanning which is inconsistent with residual vestibular issues. The patient does have a history of significant anxiety and endorsed elevated degrees of anxiety on screening measures.  Concussion symptom endorsement was very mild, with minimal exacerbation during testing.      Overall, the patient's neurocognitive findings and course/history of subtle and persisting symptoms having been present between episodes of concussion suggest that other comorbid conditions may be contributing and that she likely has recovered from her recent concussion.  Given the notoriously nonspecific nature of concussion symptoms and the potential for additional, comorbid conditions contributing to her symptom profile, additional diagnostics are recommended to rule out other comorbid conditions that might better account for residual symptoms at this time (e.g. MRI, lab work, tilt-table testing, cardiology testing, etc.).  Potential contributing differentials would include complex migraine, B12 deficiency, POTS, or other movement/autonomic conditions as well as contributions from anxiety which remains quite elevated.    Recommendations:  The patient was encouraged to continue to follow-up with her [facility] neurologist and discuss the possibility of ruling out other diagnostics to rule out contributing factors to her symptom endorsement.    The patient was encouraged to continue to follow-up and aggressively manage her anxiety with her mental health team.  It is likely that anxiety is contributing to some degree to some of her symptoms at this time, though the additional diagnostics above may be needed to determine how much anxiety is contributing.  The patient noted that her psychiatrist had considered adjusting her medications which may be  appropriate.  Given her late phase of recovery and limited evidence for residual concussion symptoms at this time, the patient was encouraged to progressively increase exercise and other activities, with a goal of returning to his normal of her routine as possible.  Should the patient have difficulty tolerating electronic media or other activities, brief breaks and pacing may be of benefit.  I would be happy to see the patient again if the above diagnostics and treatments did not lead to improvement in symptoms in order to assist with treatment planning at that time.      Results of the assessment were conveyed to the patient, caregiver, and/or provider within 14 days of completion.   The patient/caregiver acknowledged understanding of test results, associated recommendations, and healthcare plan.    History of Presenting Illness: Ms. Laurent is a 38 y.o. female with a history of multiple concussions and potential persisting symptoms.  The patient's most recent injury was sustained on 3/4/2024 when she was loading her dog into a car and the dog struck her head.  She experienced no loss of consciousness and no posttraumatic amnesia but reported immediate neck pain, feeling pressure in the head, fogginess, back pain, etc.  She followed up with Dr. Hieu Jain on 3/5/2024 where concussion was diagnosed and she was referred to physical therapy which started on 3/28/2024.  She also met with Dr. Nolan for osteopathic manipulation.  The patient reported improvement in her concussion symptoms over time with her treatments, though some symptoms persisted.    At the time of the current evaluation, the patient reported that she continues to experience at least light degrees of episodic symptoms related to sensitivity to light, balance and headache disturbance, increased migraine frequency, some visual disturbance, and difficulties tolerating certain movements/postures.  She noted that some of her symptoms will worsen with  activity such as electronic media use or exercise; however, she acknowledged that this does not happen every time.  She specifically identified gardening is something that can lead to worsening of symptoms because of having to move up and down, something that often produces symptoms.  Migraines were noted to be present at her baseline but were reported to have increased in frequency in recent months (now reported to be 2-3 times monthly).  Several of the patient's noted residual symptoms were described as having been present to some degree at her baseline, but exacerbated following the injury in March 2024.    Relevant Medical Status & History: The patient reported having sustained 4-5 prior concussions, with the first injury having been in her teens while in high school and her most recent injury being in 2023.  Injury mechanisms varied and included 1 episode of self injury (striking herself in the head several times during an episode of distress).  The patient's longest symptom duration following injury was reported to be 4 to 5 months following her 8/20/2022 skiing injury that was exacerbated when she hit her head again during her recovery phase when closing the mills of her car.  Additional medical history was reported to include an immune deficiency disorder that has followed by immunology and ENT.  She reported reasonably controlled hypothyroidism and also indicated that she had a blood clot in her left lower extremity in 2016 but denied complications related to that event.    Current Outpatient Medications:     acetaminophen (Tylenol) 500 mg tablet, Take 2 tablets (1,000 mg) by mouth every 6 hours if needed., Disp: , Rfl:     albuterol 90 mcg/actuation inhaler, Inhale 2 puffs 4 times a day as needed., Disp: , Rfl:     amoxicillin-pot clavulanate (Augmentin) 875-125 mg tablet, Take 1 tablet by mouth every 12 hours., Disp: , Rfl:     azelastine 205.5 mcg (0.15 %) spray,non-aerosol, Use as directed, Disp: , Rfl:      budesonide (Pulmicort) 0.5 mg/2 mL nebulizer solution, Take 2 mL (0.5 mg) by nebulization once daily., Disp: , Rfl:     cetirizine (ZyrTEC) 10 mg tablet, Take 1 tablet (10 mg) by mouth once daily., Disp: , Rfl:     diphenhydrAMINE (Sominex) 25 mg tablet, Take 1 tablet (25 mg) by mouth 1 (one) time per week., Disp: , Rfl:     EPINEPHrine 0.3 mg/0.3 mL injection syringe, , Disp: , Rfl:     ergocalciferol (Vitamin D-2) 1.25 MG (69418 UT) capsule, Take 1 capsule (1,250 mcg) by mouth 1 (one) time per week., Disp: , Rfl:     famotidine (Pepcid) 40 mg tablet, Take 1 tablet (40 mg) by mouth 2 times a day., Disp: , Rfl:     ferrous gluconate 324 (37.5 Fe) MG tablet, , Disp: , Rfl:     fluticasone (Flonase Sensimist) 27.5 mcg/actuation nasal spray, Use as directed, Disp: , Rfl:     immune globulin, human, (Gammagard Liquid) infusion, Infuse 150 mL (15 g) into a venous catheter 1 (one) time per week., Disp: , Rfl:     levothyroxine (Tirosint) 100 mcg capsule, Take 1 capsule (100 mcg) by mouth once every day., Disp: 30 capsule, Rfl: 3    lidocaine-prilocaine (Emla) 2.5-2.5 % cream, Apply as directed, Disp: , Rfl:     mupirocin (Bactroban) 2 % ointment, Apply as directed, Disp: , Rfl:     omeprazole (PriLOSEC) 40 mg DR capsule, Take 1 capsule (40 mg) by mouth once daily., Disp: , Rfl:     PARoxetine (Paxil) 30 mg tablet, Take 2 tablets (60 mg) by mouth once daily., Disp: , Rfl:     Psychiatric Status & History: The patient reported longstanding anxiety diagnoses, including OCD versus generalized anxiety having been present since childhood.  She noted that she has an individual psychotherapist that she meets with weekly and a psychiatrist that she meets with monthly.  She is currently treated with paroxetine but discussion of the addition of a mood stabilizing medication was reported.  Depression and mood disorder were not endorsed.  The patient indicated that anxiety was meaningfully worsened following the March 2024 event,  though this has improved some over time.  The patient denied suicidal ideation outside of some remote ideation as a teen (no suicide attempts).  She also reported history of hitting herself which has occurred as frequent as once yearly, with her most recent episode being in 2023.  The patient's mental health treatment team is reportedly aware of that history.  She denied any auditory/visual hallucination or alcohol abuse.    Developmental/Educational/Psychosocial History: The patient reported having completed a masters degree in art history and denied any history of developmental, academic, or attentional diagnoses.  She did note having some reading difficulty in early elementary school and required some additional instruction but reported being in advanced classes by the time she was in high school and was generally a strong student.  The patient reported that her most recent work has been with [employer] and [employer] and that she last worked in 2022.  She left that job because she felt she was being asked to do unethical activities and has since been unemployed, reporting difficulties obtaining a new position and searching for new positions due to concussions and medical difficulties.    Procedures/Tests: In addition to the interview, the following were administered: Test of Memory Malingering (TOMM); Dot Counting Test (DCT); Wechsler Test of Adult Reading (WTAR); Wechsler Adult Intelligence Scale, 4th Edition (WAIS-IV), selected subtests; Trail Making Test; Chapman Verbal Learning Test, Revised (HVLT-R); Brief Visuospatial Memory Test, Revised (BVMT-R); Post-Concussive Scale-Revised (PCS-R); Neal Depression Inventory, 2nd Edition (BDI-II); and State Trait Anxiety Inventory (STAI).    Cognitive testing was administered and results were used to inform counseling on safety and potential patient risks.  Cognitive testing was administered and interpretation of results included consideration of  appropriate and relevant cultural-linguistic and demographic factors.  Cognitive testing was administered and results of assessment informed the determination of diagnosis or further clarified etiological factors of cognitive impairment or complaints.    Behavioral Observations/Neurobehavioral Status Exam: The patient arrived [on time], [alone], and presented as [an appropriately dressed and groomed], [English-speaking], White female who appeared her stated age. She acknowledged understanding that in-person appointments carry an increased risk for COVID-19 and other public health concerns but wished to proceed.  All recommended infection control procedures were followed.    General Appearance: Well groomed, appropriate eye contact  Attitude/Behavior: Cooperative  Motor: No psychomotor agitation or retardation, no tremor or other abnormal movements  Speech: Normal rate, volume, prosody  Gait/Station: WFL - Within functional limits  Affect: Euthymic, full-range  Thought Process: Linear, goal directed  Thought Associations: No loosening of associations  Thought Content: Normal  Perception: No perceptual abnormalities noted  Sensorium: Alert and oriented to person, place, time and situation  Insight: Intact  Judgement: Intact  Engagement/Approach to Testing: [Performance validity testing was WNL (TOMM, DCT, and RDS)].    Some behavioral evidence of anxiety was observed during testing by the  but overall neurocognitive performance was felt to be an accurate reflection of her true neurocognitive status.    Results/Data:       Descriptors:    T-Score Standard Score Z-Score Scaled Score %ile Rank   Exceptionally High > 70 > 130 > 2.0  > 16 > 98   Above Average 64-69 120-129 1.4-1.9 15 91-97   High Average 57-63 110-119 0.7-1.3 12-14 75-90   Average 43-56  0.6 to -0.6 8-11 25-74   Low Average 37-42 80-89 -1.3 to -0.7 6-7 9-24   Below Average 30-36 70-79 -2.0 to -1.4 4-5 2-8   Exceptionally Low < 30 <  70  < -2.0 < 4 < 2     Ms. Laurent showed average to high average range [performance] on premorbid measures ([WTAR and WAIS-IV Matrix Reasoning]).  On [global] measures of attention from the WAIS-IV, the patient's performances were [notable for a clear strength in visual attention and processing speed] (Working Memory Index = 89, 23rd percentile; Processing Speed Index = 124, 95th percentile).  On a measure of attention and visuomotor processing speed ([place] Making Test), she showed average to low average range performance, [without a meaningful decline on Part B relative to Part A].  Her verbal memory (HVLT-R) was average to high average range p.o. trials, [with good] retention of learned information on recall [and recognition] trials.  Her visual memory (BVMT-R) was high average range across trials, [with good] retention of learned information on recall [and recognition] trials. Ms. Laurent endorsed [only very mild] concussion symptoms [prior to testing] (pre-testing PCS-R = 1, 9 symptoms endorsed), which showed only very minimal exacerbation following testing (post-testing PCS-R = 11, 10 symptoms endorsed). Of note, the patient did endorse elevated degrees of anxiety (particularly chronic/trait anxiety) though minimal depressive symptoms were endorsed on the BDI-II and STAI.       15444 = 1; 68367 = 1; 99336 = 2; 36386 = 1; 36832 = 2

## 2024-07-17 ENCOUNTER — OFFICE VISIT (OUTPATIENT)
Dept: ORTHOPEDIC SURGERY | Facility: CLINIC | Age: 39
End: 2024-07-17
Payer: COMMERCIAL

## 2024-07-17 ENCOUNTER — APPOINTMENT (OUTPATIENT)
Dept: ORTHOPEDIC SURGERY | Facility: CLINIC | Age: 39
End: 2024-07-17
Payer: COMMERCIAL

## 2024-07-17 DIAGNOSIS — S06.0X0D CONCUSSION WITHOUT LOSS OF CONSCIOUSNESS, SUBSEQUENT ENCOUNTER: Primary | ICD-10-CM

## 2024-07-17 DIAGNOSIS — S16.1XXS CERVICAL STRAIN, SEQUELA: ICD-10-CM

## 2024-07-17 PROCEDURE — 99215 OFFICE O/P EST HI 40 MIN: CPT | Performed by: PEDIATRICS

## 2024-07-17 PROCEDURE — 1036F TOBACCO NON-USER: CPT | Performed by: PEDIATRICS
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
## 2024-07-17 NOTE — PROGRESS NOTES
"Chief Complaint: Concussion  Consulting physician: DIONNE Ornelas  A report with my findings and recommendations will be sent to the referring physician via written or electronic means when information is available    Concussion History:  Rachelle Laurent is a 38 y.o. female  is here for concern of a sports related head injury.  Date of injury: ***  Injury mechanism: ***    Sports: ***  School/ Grade: ***  Academics: Typical Grades: *** Standardized Testing? ***    Prior cognitive testing/ImPACT: {YES/DATE/NO:19892::\"No\"}     Prior Concussion: {Yes- *** No N/A:43703::\"No\"}    Confounding Issues: {Confounding Factors:12329::\"None\"}    Prior evaluation(s) / imaging performed: {Yes- *** No N/A:07531::\"No\"}    POST CONCUSSION SYMPTOM SCALE (SCOAT6):  Symptom score (of 21):  ***  Symptom Severity Score (of 132): ***  (See scanned sheet)  If 100% is normal, what percent do you feel now? ***%  Why? ***    SCHOOL / COGNITIVE FUNCTION:  Can you read or concentrate without having any difficulty? {Responses; yes/no/not done:54975::\"no\"}  Do your symptoms worsen with mental activity?  {Yes/No:83671::\"Yes \"}  Can you tolerated 30 minutes of homework without significant symptom worsening? {Responses; yes/no/not done:72236::\"no\"}  Have you been attending school full time since the injury?: {Yes/No:47723::\"Yes \"}  Are you using any academic accommodations? {Yes- *** No N/A:75521::\"No\"}    SLEEP:  Are you having difficulty sleeping? {Yes- *** No N/A:67711::\"No\"}  Are you sleeping 8 hours a night?   {YES/NO DESCRIBE:87489::\"No, Describe ***\"}  Are you napping; if yes, how often and how long?  {Yes- *** No N/A:21915::\"No\"}    SPORT/EXERCISE:  Are you exercising? {Yes- *** No N/A:83504::\"No\"}  Do your symptoms worsen with exercise? {Yes- *** No N/A:40344::\"No\"}  Have you started a return to play? {Yes- *** No N/A:06611::\"No\"}    MOOD HX:   Are you more irritable, depressed, anxious, or stressed {Yes- *** No " "N/A:99282::\"No\"}  Do you see anyone for counseling or have you in the past? {Yes- *** No N/A:61413::\"No\"}     SCREEN TIME:  Do you get symptoms with screen use? {yes,no:01118::\"No\"}    PHYSICAL EXAM:  Orthostatic VS  There were no vitals filed for this visit.  Symptoms triggered: no  Heart rate Increase>30?: no    General  Constitutional: normal, well appearing  Psychiatric: full affect and appropriate to topic. Good insight to injury.  Skin: unremarkable  Head: Atraumatic, no bruising or swelling   External inspection of ears, nose, mouth: normal    CN II-XII:  II: Visual fields full to confrontation bilaterally  III, IV, VI: EOMI, No Nystagmus, PERRL  V: Sensation intact to all 3 distributions of trigeminal nerve  VII: Face symmetric  VIII: Hearing- normal to finger rub gómez (vestibular testing below)  IX, X: normal, symmetric soft palate rise  XI: shoulder shrug intact gómez 5/5  XII: tongue protrudes midline    Coordination: Normal rapid finger to nose GÓMEZ.    Optho / Vestibular: Evaluate for change in symptoms of Headache, Nausea, Dizziness, or Fogginess  Smooth Pursuits - symptom exacerbation? {NO/YES:34937::\"No\"}  Saccades horizontal (lateral eye motion) -symptom exacerbation? {NO/YES:36209::\"No\"}  VOR horizontal (head rotation)- symptom exacerbation? {NO/YES:92541::\"No\"}  VMS (80 degree trunk rotation side to side) - symptom exacerbation? {NO/YES:30040::\"No\"}    Cervical Spine Exam  Supple without evidence of trauma  Full Active Range of Motion (flexion, extension, rotation) without pain or symptoms? {YES/NO DESCRIBE:34281::\"Yes\"}  Muscle spasm: {NO/YES:21467::\"No\"}  Midline tenderness: {NO/YES:94888::\"No\"}  Paravertebral tenderness: {NO/YES:78149::\"No\"}    Modified GUY  Foot tested {RIGHT/LEFT:20294::\"left\"}  Double leg stance: ***  Single leg stance: ***  Tandem stance:  ***  TOTAL: ***    Discussion  Rachelle Mehran is a 38 y.o. female  is a ***athlete here with *** concussion sustained on " ***.    7/17/2024 Patient Concussion Symptom Score: *** symptoms with *** severity score  They feel ***% of normal.     No diagnosis found.     Follow up: 7-10 days-- call if concerns in the interim  Conservative care guidelines were discussed with the patient (and family members present) and the following was reviewed:  We discussed the pathophysiology, diagnosis, and treatment of concussion. We do not categorize concussions in terms of severity or grade. This was reviewed with the family. We did also review that individuals who suffer a concussion will be at increased likelihood for suffering additional concussions in the future. We treat concussions using modifications of physical and cognitive activity as well as electronic use. We do not recommend completely shutting down and sitting in a dark room doing nothing - this slows recovery.    The following treatment recommendations were discussed and provided on handout to help speed your recovery:  1) ACTIVITY MODIFICATION: Follow the rule of the 4 Rs: REST, RECOGNIZE when symptoms increase, REMOVE yourself until symptoms, and then RETRY.    2) SCHOOL: A note will be provided for school accommodations. Not everyone needs all the option. Discuss with your guidance counselor, , or school nurse to coordinate what you need as you heal. The goal is to modify, not delay school return. Hopefully, you will be back in school within a week. Return to school once able to focus for an extended period of time (~45 min) without increase symptoms. Once in school, start gradually with breaks every hour. One plan is to alternate classes with a break period. Switch which classes you miss daily so you do not fall behind.      3) ELECTRONICS/SCREEN TIME: Avoid video games, computer, tablet, etc. Quiet television for 30 minute sessions at a time. Limit texting, instagram, snapchat, tiktok, you tube, and cell phone use. If you must use a computer, turn down brightness and  increase font size. Ideally, print out computer work.     4) EXERCISE: Walking and other light activity with no risk for contact to the head is encouraged if it doesn't increase symptoms. Start easy and increase to tolerance.     5) SLEEP: Restful and restorative sleep is essential. Good sleep habits include remaining on a good sleep schedule and restricting daytime napping after the first 1-2 days to 20 minutes per day and not after 6pm. Avoid screen time 1 hour before bedtime.    6) LIGHT/NOISE SENSITIVITY: Avoid loud and bright activities until symptoms improve and only if symptoms do not worsen. This includes sporting events (practices and games). Sunglasses and a hat can be used for light sensitivity. Earplugs may help with noise sensitivity.     7) HEADACHE/NECK PAIN: Ice on neck 10-15 min as needed. Perform chin tucks and scapular squeezes multiple times daily. Physical therapy may help neck pain and headache management.    8) RETURN TO SPORT: each patient MUST BE CLEARED BY A MEDICAL PROFESSIONAL PRIOR TO CONTACT ACTIVITY. Prior to return to full activity, a staged progression to contact activity and sport is necessary. This will begin once symptoms improve and will be guided by your physician, physical therapist, or . Each stage progressively challenges your body (cardio without movement of environment, cardio with motion, cognitive challenge with cardio/skills practice, scrimmage practice, game play) and take a minimum of 5 days.      CLEARANCE EXPECTATIONS:  Must be back to full days of school with minimal to no symptoms  Must be able to progress through the stages of return to play without symptoms  Must be cleared with cognitive testing (Impact Test or Neuropsychology appointment)  Written release to contact sports from your , PT, or physician  Clearance comes from your physician, however, we will work closely with your physical and  (if you have one) to  assure you return as soon as possible.    Referral information:  PHYSICAL THERAPY: You may be referred to physical therapy to assist with your recovery. If given a script, please call to schedule as soon as able. Physical therapy +/- Vestibular therapy - addresses balance, neck pain, headaches, eye pain/dizziness with eye motion. PT also can help manage concussion treatment and return to play. Often, PT speeds up recovery. A list of  PT centers is available. Dr. Goldberg works closely with the [facility]. Their number is [phone].    COGNITIVE TESTING:  Recovery from a head injury takes time and it can be difficult to determine when your brain is thinking well again despite still having some symptoms. While symptom improvement is an important sign, it does not always mean your brain has fully recovered.  We can test your brain's ability to think with a computerized test called Impact or in person testing with a Neuropsychologist. The in person testing is more complete and is helpful for many athletes when Impact is not available, no baseline is available, or not a good testing platform for that individual.    If referred: call [phone] to to make appointment with Dr. Miles or Greg in Neuropsychology. Please speak with Namrata or Aranza only. *Do not schedule through central scheduling or with any other neuropsychologist as these are the only two that do concussion testing protocols.*  Only schedule once your symptoms have cleared and you are back in full days of school. You may call now to schedule for a later date and postpone if not better. The evaluation takes several hours and is only offered a few days a week.

## 2024-08-07 ENCOUNTER — OFFICE VISIT (OUTPATIENT)
Dept: NEUROLOGY | Facility: HOSPITAL | Age: 39
End: 2024-08-07
Payer: COMMERCIAL

## 2024-08-07 VITALS
RESPIRATION RATE: 18 BRPM | BODY MASS INDEX: 34.6 KG/M2 | HEART RATE: 96 BPM | TEMPERATURE: 96 F | SYSTOLIC BLOOD PRESSURE: 123 MMHG | HEIGHT: 62 IN | WEIGHT: 188 LBS | DIASTOLIC BLOOD PRESSURE: 84 MMHG

## 2024-08-07 DIAGNOSIS — R42 DIZZINESS: Primary | ICD-10-CM

## 2024-08-07 DIAGNOSIS — G43.009 MIGRAINE WITHOUT AURA AND WITHOUT STATUS MIGRAINOSUS, NOT INTRACTABLE: ICD-10-CM

## 2024-08-07 PROCEDURE — 99215 OFFICE O/P EST HI 40 MIN: CPT | Performed by: PSYCHIATRY & NEUROLOGY

## 2024-08-07 PROCEDURE — 99205 OFFICE O/P NEW HI 60 MIN: CPT | Performed by: PSYCHIATRY & NEUROLOGY

## 2024-08-07 PROCEDURE — 3008F BODY MASS INDEX DOCD: CPT | Performed by: PSYCHIATRY & NEUROLOGY

## 2024-08-07 RX ORDER — SUMATRIPTAN 50 MG/1
50 TABLET, FILM COATED ORAL ONCE AS NEEDED
Qty: 9 TABLET | Refills: 5 | Status: SHIPPED | OUTPATIENT
Start: 2024-08-07

## 2024-08-07 ASSESSMENT — PAIN SCALES - GENERAL: PAINLEVEL: 0-NO PAIN

## 2024-08-07 NOTE — PROGRESS NOTES
Subjective     Chief Complaint: Headache    Rachelle Laurent is a 38 y.o. year old female who presents with chief complaint of headaches. She is accompanied by , Sharath Bush is a 38 y.o. RH woman with PMHx of common variable immunodeficiency, with weekly subcutaneous IVIG, hypothyroidism, Hx DVT in 2016 anxiety, OCD,  reports 7 concussions in the last three years. She had once 01/2022 during skiing. She developed migraine in 2022 She was closing the back of the car and hit her head in 1/2022.  She reports headache, confusion and nausea, was able to return to work 4 months after that.  03/2023, self-induced head injury when she hit herself in the head.  She has been on antidepressant since age 13, she was diagnosed with OCD as a teenager. She had LoC when she was 15 years old.  She reports intermittent tingling and numbness involving different areas of the body.    Rachelle started getting headaches at age 36.  Headaches gradually worsening in frequency and severity over the course of 2 years. Generally, headaches last for hours up to 24 hours in duration. Patient has 3/30 headache days per month. The headaches are usually throbbing and are located in frontal area, sometimes behind the eye, generally symmetric. The patient rates her most severe headaches a 8 in intensity. Associated nausea, photophobia, phonophobia, vestibular symptoms, and vomiting. Headaches are worsened with exertion. Triggers include  menstruation, poor air quality, stress .    Rachelle does not experience headache aura.    Work attendance or other daily activities are affected by the headaches.    Current Acute Headache Treatment Ibuprofen, Tylenol   Current Preventative Headache Treatment    Previous Acute Headache Treatment Ibuprofen, Tyenol   Previous Preventative Headache Treatment Topiramate        Social history:  No tobacco use, no alcohol, no drug use  ROS: As per HPI, otherwise all other systems have been  "reviewed are negative for complaint.     Review of Systems    Past Medical History:   Diagnosis Date    Acute deep vein thrombosis (DVT) of femoral vein of right lower extremity (Multi) 12/06/2016    Formatting of this note might be different from the original. Diagnosed 10/29/2016    Chronic urticaria     CVID (common variable immunodeficiency) (Multi)     Deviated nasal septum 06/09/2013    Acquired deviated nasal septum    Obsessive-compulsive disorder, unspecified 06/09/2013    Obsessive compulsive disorder    Other conditions influencing health status 06/09/2013    Brachial Plexus Palsy    Unspecified asthma, uncomplicated ([facility]) 06/09/2013    Extrinsic asthma     Past Surgical History:   Procedure Laterality Date    MOUTH SURGERY  07/02/2014    Oral Surgery Tooth Extraction     Family History   Problem Relation Name Age of Onset    Coronary artery disease Mother      Depression Mother      Hypertension Mother      Aortic aneurysm Mother      Diabetes Father      Heart failure Father      Stroke Father      Migraines Sister      Other (immune deficiency disorder) Sister      Adrenal disorder Sister      Diabetes Sister       Social History     Tobacco Use    Smoking status: Never     Passive exposure: Never    Smokeless tobacco: Never   Substance Use Topics    Alcohol use: Yes        Objective   /84   Pulse 96   Temp 35.6 °C (96 °F)   Resp 18   Ht 1.575 m (5' 2\")   Wt 85.3 kg (188 lb)   BMI 34.39 kg/m²     Neuro Exam:  Cardiac Exam: No apparent edema of b/l lower extremities  Neurological Exam:  MENTAL STATUS:   General Appearance: No distress, alert, interactive, and cooperative. Orientation was normal to time, place and person. Recent and remote memory was intact.     OPHTHALMOSCOPIC:   The ophthalmoscopic exam was normal. The fundi were well visualized with normal disc margins, clear vessels and vascular pulsations. No disc edema. The cup/disk ratio was not enlarged. No hemorrhages or exudates " were present in the posterior segments that were visualized.     CRANIAL NERVES:   CN 2         Visual fields full to confrontation.   CN 3, 4, 6   Pupils round, 4 mm in diameter, equally reactive to light. Lids symmetric; no ptosis. EOMs normal alignment, full range with normal saccades, pursuit and convergence.   No nystagmus.   CN 5   Facial sensation intact bilaterally.   CN 7   Normal and symmetric facial strength. Nasolabial folds symmetric.   CN 8   Hearing intact to conversation and finger rub.  CN 9, 10   Palate elevates symmetrically.  CN 11   Normal strength of shoulder shrug and neck turning.   CN 12   Tongue midline, with normal bulk and strength; no fasciculations.     MOTOR:   Muscle bulk and tone were normal in both upper and lower extremities.   No pronator drift bilaterally.  No fasciculations, tremor or other abnormal movements evident with the patient examined clothed.    STRENGTH:  R  L  Deltoid            5          5  Biceps  5 5  Triceps  5 5    Hip flexion 5 5  Knee Flex 5 5  Knee Ex 5 5    REFLEXES: R L  Biceps  2 2                     Triceps  2 2  Patellar  2 2     SENSORY:   In both upper and lower extremities, sensation was intact to light touch.    COORDINATION:   In both upper extremities, finger-nose-finger was intact without dysmetria or overshoot.     GAIT:   Station was stable with a normal base. Gait was stable with a normal arm swing and speed. No ataxia, shuffling, steppage or waddling was present. No circumduction was present. No Romberg sign was present.    Neurological Exam  Physical Exam    Results  Lab on 06/24/2024   Component Date Value    WBC 06/24/2024 6.4     nRBC 06/24/2024 0.0     RBC 06/24/2024 4.30     Hemoglobin 06/24/2024 12.2     Hematocrit 06/24/2024 37.9     MCV 06/24/2024 88     MCH 06/24/2024 28.4     MCHC 06/24/2024 32.2     RDW 06/24/2024 13.4     Platelets 06/24/2024 269     Neutrophils % 06/24/2024 55.0     Immature Granulocytes %,* 06/24/2024 0.3      Lymphocytes % 06/24/2024 34.6     Monocytes % 06/24/2024 9.0     Eosinophils % 06/24/2024 0.3     Basophils % 06/24/2024 0.8     Neutrophils Absolute 06/24/2024 3.52     Immature Granulocytes Ab* 06/24/2024 0.02     Lymphocytes Absolute 06/24/2024 2.22     Monocytes Absolute 06/24/2024 0.58     Eosinophils Absolute 06/24/2024 0.02     Basophils Absolute 06/24/2024 0.05     Iron 06/24/2024 68     UIBC 06/24/2024 317     TIBC 06/24/2024 385     % Saturation 06/24/2024 18 (L)     Transferrin 06/24/2024 286     Vitamin D, 25-Hydroxy, T* 06/24/2024 32        Assessment/Plan     Rachelle is a 38 y.o. RH woman with PMHx of common variable immunodeficiency, with weekly subcutaneous IVIG, hypothyroidism, Hx DVT in 2016 anxiety, OCD,  reports 7 concussions in the last three years. She had once 01/2022 during skiing. She developed migraine in 2022 She was closing the back of the car and hit her head in 1/2022.  She reports headache, lightheadedness and heart racing when she stands up for long time, confusion and nausea, was able to return to work 4 months after that.  03/2023, self-induced head injury when she hit herself in the head. Neurological examination is non-focal. Given the history and today's evaluation, I suspect migraine without aura, differential diagnosis include post concussion syndrome, and medication overuse headache, functional overlay is possible , a multifactorial etiology can't be entirely ruled out. .    PLAN:  MRI brain w/wo contrast      Autonomic testing for orthostatic lightheadedness      Adequate hydration  Discussed medication overuse headache      You have been prescribed a medication called sumatriptan at a dose of 50mg. This medication is to be taken as needed to stop  a     migraine. It is most effective if taken at onset of headache, with a goal of completely resolving a migraine within 2 hours. If you do not  have complete resolution of headache within 2 hours, take a second tablet. Max  daily dose is 4 tablets (200mg).    Please take this medication less than 10 times per month. Taken too often, there is a chance of developing medication overuse headache, which would result in a higher migraine frequency.     Common side effects include light-headed sensation, drowsiness, and restlessness. Another side effect is the sensation of chest/neck tightness that can last minutes to a few hours. You should not have any difficulty breathing or irregular heart rhythm with this side effect, and it self resolves.    Do not take this medication if you have uncontrolled blood pressure or a history of heart attack, as it can cause vasospasm.     If you do not tolerate this medication, please discontinue its use, and we can discuss alternative acute medications at your upcoming appointment.  Keeping a headache diary is important, detailed records of headache episodes can help provide additional insight about triggers and how to avoid them.    Follow-up in 3 months or earlier if needed.      To prevent medication overuse headache:  · Take your headache medication as prescribed.  · Avoid medications that contain butalbital or opioids.  · Use OTC painkillers less than 15 days a month.  · Limit use of triptans or combination analgesics to no more than nine days a month.    General guidelines that can help prevent most headaches:  · Avoid headache triggers: for example dark chocolate, aged cheese and diet fizzy drinks. Keep a headache diary with details about every headache. Eventually, you may see a pattern.  · Get enough sleep. Go to bed and wake up at the same time every day - even on weekends.  · Stay hydrated. Be sure to drink plenty of water or other uncaffeinated fluids.  · Exercise regularly and reduce stress.   ·  Lose weight. Obesity can contribute to headache development, so if you need to lose weight, find a program that works for you.  · Quit smoking. If you smoke, talk to your doctor about quitting.  Smoking is linked to a higher risk of medication overuse headache.         Please call [phone] if you have any questions.

## 2024-08-07 NOTE — PATIENT INSTRUCTIONS
You were seen today by Dr. Jasmine.  It is a pleasure seeing you.    Given the history and today's evaluation, I suspect migraine without aura, differential diagnosis include post concussion syndrome, and medication overuse headache.    PLAN:  MRI brain w/wo contrast      Autonomic testing for orthostatic lightheadedness      Adequate hydration  Discussed medication overuse headache      You have been prescribed a medication called sumatriptan at a dose of 50mg. This medication is to be taken as needed to stop  a     migraine. It is most effective if taken at onset of headache, with a goal of completely resolving a migraine within 2 hours. If you do not  have complete resolution of headache within 2 hours, take a second tablet. Max daily dose is 4 tablets (200mg).    Please take this medication less than 10 times per month. Taken too often, there is a chance of developing medication overuse headache, which would result in a higher migraine frequency.     Common side effects include light-headed sensation, drowsiness, and restlessness. Another side effect is the sensation of chest/neck tightness that can last minutes to a few hours. You should not have any difficulty breathing or irregular heart rhythm with this side effect, and it self resolves.    Do not take this medication if you have uncontrolled blood pressure or a history of heart attack, as it can cause vasospasm.     If you do not tolerate this medication, please discontinue its use, and we can discuss alternative acute medications at your upcoming appointment.  Keeping a headache diary is important, detailed records of headache episodes can help provide additional insight about triggers and how to avoid them.    Follow-up in 3 months or earlier if needed.      To prevent medication overuse headache:  · Take your headache medication as prescribed.  · Avoid medications that contain butalbital or opioids.  · Use OTC painkillers less than 15 days a  month.  · Limit use of triptans or combination analgesics to no more than nine days a month.    General guidelines that can help prevent most headaches:  · Avoid headache triggers: for example dark chocolate, aged cheese and diet fizzy drinks. Keep a headache diary with details about every headache. Eventually, you may see a pattern.  · Get enough sleep. Go to bed and wake up at the same time every day - even on weekends.  · Stay hydrated. Be sure to drink plenty of water or other uncaffeinated fluids.  · Exercise regularly and reduce stress.   ·  Lose weight. Obesity can contribute to headache development, so if you need to lose weight, find a program that works for you.  · Quit smoking. If you smoke, talk to your doctor about quitting. Smoking is linked to a higher risk of medication overuse headache.         Please call [phone] if you have any questions.

## 2024-08-14 ENCOUNTER — OFFICE VISIT (OUTPATIENT)
Dept: PRIMARY CARE | Facility: CLINIC | Age: 39
End: 2024-08-14
Payer: COMMERCIAL

## 2024-08-14 ENCOUNTER — HOSPITAL ENCOUNTER (OUTPATIENT)
Dept: RADIOLOGY | Facility: CLINIC | Age: 39
Discharge: HOME | End: 2024-08-14
Payer: COMMERCIAL

## 2024-08-14 VITALS
HEART RATE: 91 BPM | SYSTOLIC BLOOD PRESSURE: 124 MMHG | TEMPERATURE: 97.8 F | BODY MASS INDEX: 35.48 KG/M2 | DIASTOLIC BLOOD PRESSURE: 80 MMHG | WEIGHT: 194 LBS | OXYGEN SATURATION: 99 %

## 2024-08-14 DIAGNOSIS — M79.604 RIGHT LEG PAIN: ICD-10-CM

## 2024-08-14 DIAGNOSIS — E03.9 HYPOTHYROIDISM, UNSPECIFIED TYPE: ICD-10-CM

## 2024-08-14 DIAGNOSIS — K21.9 GASTROESOPHAGEAL REFLUX DISEASE, UNSPECIFIED WHETHER ESOPHAGITIS PRESENT: ICD-10-CM

## 2024-08-14 DIAGNOSIS — G44.86 CERVICOGENIC HEADACHE: Primary | ICD-10-CM

## 2024-08-14 PROCEDURE — 99214 OFFICE O/P EST MOD 30 MIN: CPT | Performed by: NURSE PRACTITIONER

## 2024-08-14 PROCEDURE — 93971 EXTREMITY STUDY: CPT | Performed by: RADIOLOGY

## 2024-08-14 PROCEDURE — 93971 EXTREMITY STUDY: CPT

## 2024-08-14 RX ORDER — AZELASTINE 1 MG/ML
SPRAY, METERED NASAL
COMMUNITY
Start: 2024-07-17

## 2024-08-14 RX ORDER — OMEPRAZOLE 40 MG/1
40 CAPSULE, DELAYED RELEASE ORAL
Qty: 90 CAPSULE | Refills: 1 | Status: SHIPPED | OUTPATIENT
Start: 2024-08-14

## 2024-08-14 ASSESSMENT — ENCOUNTER SYMPTOMS
FEVER: 0
FATIGUE: 1
ABDOMINAL PAIN: 1
COUGH: 0
WHEEZING: 0
CHEST TIGHTNESS: 0
PALPITATIONS: 0
VOMITING: 0
NAUSEA: 1
SHORTNESS OF BREATH: 0
NUMBNESS: 1
DIARRHEA: 0
WEAKNESS: 0
HEADACHES: 1

## 2024-08-14 NOTE — PROGRESS NOTES
AWSubjective   Rachelle Laurent is a 38 y.o. female who presents for Post Concussion (Last one was July 13th. Has had previous Concussion's this year. She has seen Neuro and Neuro Phys- recommend doing a B12 panel. Symptoms currently, headache, nausea and light and sound sensitivity  ).    HPI  She presents to the office today to discuss ongoing concussion symptoms (headaches, brain fog, dizziness)-present since March. She has had several concussions in the past. Most recent was in March and July.    IVIG infusions seem to make the symptoms worse.   She is following with neurology.  MRI ordered for next week.   Sports medicine recommended checking a Vitamin b12 level.     She also reports about 2 days ago started to notice some pain in the right leg, upper and lower (calf and upper medial thigh)  (+) swelling in the right leg the other day after a 50 min car ride. She reports she was wearing her sleeve at that time.   Has had issues with chronic swelling in the right leg since her blood clot several years ago but never that quickly.  She is concerned because the pain in the right leg is also very different for her.  She has not had any SOB or palpitations.  No chest pain out of her normal- gets at times from heartburn/indigestion and Thoracic outlet syndrome  Has recently cut back Omeprazole from 40 mg to 20 mg over the past few months.  She has noticed her gastric pain/GERD is not as well controlled.  She had an EGD in fall 2023 (reveiwed today)  Has not been able to get in touch with her GI specialist   She also recent was taking Ibuprofen for her menstrual cramps which does not help.    Review of Systems   Constitutional:  Positive for fatigue. Negative for chills and fever.   Respiratory:  Negative for cough, chest tightness, shortness of breath and wheezing.    Cardiovascular:  Positive for leg swelling. Negative for palpitations.   Gastrointestinal:  Positive for abdominal pain and nausea. Negative for  diarrhea and vomiting.   Neurological:  Positive for numbness and headaches. Negative for syncope and weakness.       Objective   /80 (BP Location: Left arm, Patient Position: Sitting)   Pulse 91   Temp 36.6 °C (97.8 °F) (Temporal)   Wt 88 kg (194 lb)   SpO2 99%   BMI 35.48 kg/m²     Physical Exam  Constitutional:       General: She is not in acute distress.     Appearance: Normal appearance. She is not toxic-appearing.   Eyes:      Extraocular Movements: Extraocular movements intact.      Conjunctiva/sclera: Conjunctivae normal.      Pupils: Pupils are equal, round, and reactive to light.   Neck:      Vascular: No carotid bruit.   Cardiovascular:      Rate and Rhythm: Normal rate and regular rhythm.      Pulses: Normal pulses.      Heart sounds: Normal heart sounds, S1 normal and S2 normal. No murmur heard.  Pulmonary:      Effort: Pulmonary effort is normal. No respiratory distress.      Breath sounds: Normal breath sounds.   Abdominal:      General: Bowel sounds are normal.      Palpations: Abdomen is soft.      Tenderness: There is no abdominal tenderness.   Musculoskeletal:      Right lower leg: No edema.      Left lower leg: No edema.   Lymphadenopathy:      Cervical: No cervical adenopathy.   Neurological:      Mental Status: She is alert and oriented to person, place, and time.   Psychiatric:         Attention and Perception: Attention normal.         Mood and Affect: Mood and affect normal.         Behavior: Behavior normal. Behavior is cooperative.         Thought Content: Thought content normal.         Cognition and Memory: Cognition normal.         Judgment: Judgment normal.         Assessment/Plan   Problem List Items Addressed This Visit       GERD (gastroesophageal reflux disease)     Refilled Omeprazole 40 mg daily since unable to get in touch with GI.         Relevant Medications    omeprazole (PriLOSEC) 40 mg DR capsule    Hypothyroidism    Relevant Orders    TSH with reflex to Free T4  if abnormal    Lipid Panel    Headache - Primary     Will check Vitamin b12  level. She is following with neurology. Repeat iron studies pending from immunology.         Relevant Orders    Vitamin B12    Right leg pain     Given prior history of DVT will check STAT US RLE today. Suspect swelling is due to her chronic venous insufficiency.         Relevant Orders    Vascular US lower extremity venous duplex right (Completed)     It has been a pleasure seeing you today!

## 2024-08-15 ASSESSMENT — ENCOUNTER SYMPTOMS: CHILLS: 0

## 2024-08-15 NOTE — ASSESSMENT & PLAN NOTE
Will check Vitamin b12  level. She is following with neurology. Repeat iron studies pending from immunology.

## 2024-08-15 NOTE — ASSESSMENT & PLAN NOTE
Given prior history of DVT will check STAT US RLE today. Suspect swelling is due to her chronic venous insufficiency.

## 2024-08-19 ENCOUNTER — TELEPHONE (OUTPATIENT)
Dept: PRIMARY CARE | Facility: CLINIC | Age: 39
End: 2024-08-19
Payer: COMMERCIAL

## 2024-08-19 NOTE — TELEPHONE ENCOUNTER
Pt was just in on 08/14/24 and you ordered BW. She know she has to fast but she is wondering if she should take her Levothyroxine prior to blood work or wait till after the blood work. Please advise

## 2024-08-21 ENCOUNTER — HOSPITAL ENCOUNTER (OUTPATIENT)
Dept: RADIOLOGY | Facility: HOSPITAL | Age: 39
Discharge: HOME | End: 2024-08-21
Payer: COMMERCIAL

## 2024-08-21 ENCOUNTER — APPOINTMENT (OUTPATIENT)
Dept: RADIOLOGY | Facility: HOSPITAL | Age: 39
End: 2024-08-21
Payer: COMMERCIAL

## 2024-08-21 DIAGNOSIS — G43.009 MIGRAINE WITHOUT AURA AND WITHOUT STATUS MIGRAINOSUS, NOT INTRACTABLE: ICD-10-CM

## 2024-08-21 DIAGNOSIS — R42 DIZZINESS: ICD-10-CM

## 2024-08-21 PROCEDURE — 70553 MRI BRAIN STEM W/O & W/DYE: CPT | Performed by: RADIOLOGY

## 2024-08-21 PROCEDURE — 2550000001 HC RX 255 CONTRASTS: Performed by: PSYCHIATRY & NEUROLOGY

## 2024-08-21 PROCEDURE — A9575 INJ GADOTERATE MEGLUMI 0.1ML: HCPCS | Performed by: PSYCHIATRY & NEUROLOGY

## 2024-08-21 PROCEDURE — 70553 MRI BRAIN STEM W/O & W/DYE: CPT

## 2024-08-21 RX ORDER — GADOTERATE MEGLUMINE 376.9 MG/ML
18 INJECTION INTRAVENOUS
Status: COMPLETED | OUTPATIENT
Start: 2024-08-21 | End: 2024-08-21

## 2024-08-28 DIAGNOSIS — T78.00XA ANAPHYLACTIC REACTION DUE TO FOOD, INITIAL ENCOUNTER: ICD-10-CM

## 2024-08-28 DIAGNOSIS — T78.03XA ANAPHYLACTIC SHOCK DUE TO SEAFOOD, INITIAL ENCOUNTER: ICD-10-CM

## 2024-08-28 DIAGNOSIS — L50.8 CHRONIC URTICARIA: ICD-10-CM

## 2024-08-28 DIAGNOSIS — T78.01XA ANAPHYLACTIC REACTION DUE TO PEANUTS, INITIAL ENCOUNTER: Primary | ICD-10-CM

## 2024-08-28 DIAGNOSIS — T78.05XA ANAPHYLACTIC REACTION DUE TO TREE NUTS AND SEEDS, INITIAL ENCOUNTER: ICD-10-CM

## 2024-08-28 DIAGNOSIS — D89.49 OTHER MAST CELL ACTIVATION DISORDER (MULTI): ICD-10-CM

## 2024-09-04 ENCOUNTER — LAB (OUTPATIENT)
Dept: LAB | Facility: LAB | Age: 39
End: 2024-09-04
Payer: COMMERCIAL

## 2024-09-04 DIAGNOSIS — T78.01XA ANAPHYLACTIC REACTION DUE TO PEANUTS, INITIAL ENCOUNTER: ICD-10-CM

## 2024-09-04 DIAGNOSIS — T78.00XA ANAPHYLACTIC REACTION DUE TO FOOD, INITIAL ENCOUNTER: ICD-10-CM

## 2024-09-04 DIAGNOSIS — G44.86 CERVICOGENIC HEADACHE: ICD-10-CM

## 2024-09-04 DIAGNOSIS — E03.9 HYPOTHYROIDISM, UNSPECIFIED TYPE: ICD-10-CM

## 2024-09-04 DIAGNOSIS — T78.03XA ANAPHYLACTIC SHOCK DUE TO SEAFOOD, INITIAL ENCOUNTER: ICD-10-CM

## 2024-09-04 DIAGNOSIS — T78.05XA ANAPHYLACTIC REACTION DUE TO TREE NUTS AND SEEDS, INITIAL ENCOUNTER: ICD-10-CM

## 2024-09-04 DIAGNOSIS — D89.49 OTHER MAST CELL ACTIVATION DISORDER (MULTI): ICD-10-CM

## 2024-09-04 DIAGNOSIS — L50.8 CHRONIC URTICARIA: ICD-10-CM

## 2024-09-04 LAB
CHOLEST SERPL-MCNC: 228 MG/DL (ref 0–199)
CHOLESTEROL/HDL RATIO: 4.7
HDLC SERPL-MCNC: 48.9 MG/DL
IGE SERPL-ACNC: 3 IU/ML (ref 0–214)
LDLC SERPL CALC-MCNC: 145 MG/DL
NON HDL CHOLESTEROL: 179 MG/DL (ref 0–149)
THYROPEROXIDASE AB SERPL-ACNC: >1000 IU/ML
TRIGL SERPL-MCNC: 173 MG/DL (ref 0–149)
TSH SERPL-ACNC: 2.7 MIU/L (ref 0.44–3.98)
VIT B12 SERPL-MCNC: 563 PG/ML (ref 211–911)
VLDL: 35 MG/DL (ref 0–40)

## 2024-09-04 PROCEDURE — 86003 ALLG SPEC IGE CRUDE XTRC EA: CPT

## 2024-09-04 PROCEDURE — 82785 ASSAY OF IGE: CPT

## 2024-09-04 PROCEDURE — 36415 COLL VENOUS BLD VENIPUNCTURE: CPT

## 2024-09-04 PROCEDURE — 86008 ALLG SPEC IGE RECOMB EA: CPT

## 2024-09-04 PROCEDURE — 83520 IMMUNOASSAY QUANT NOS NONAB: CPT

## 2024-09-04 PROCEDURE — 84443 ASSAY THYROID STIM HORMONE: CPT

## 2024-09-04 PROCEDURE — 82607 VITAMIN B-12: CPT

## 2024-09-04 PROCEDURE — 86800 THYROGLOBULIN ANTIBODY: CPT

## 2024-09-04 PROCEDURE — 80061 LIPID PANEL: CPT

## 2024-09-04 PROCEDURE — 86376 MICROSOMAL ANTIBODY EACH: CPT

## 2024-09-05 LAB
ALMOND IGE QN: <0.1 KU/L
ANNOTATION COMMENT IMP: NORMAL
BLUE MUSSEL IGE QN: <0.1 KU/L
BRAZIL NUT IGE QN: <0.1 KU/L
CASHEW NUT IGE QN: <0.1 KU/L
CLAM IGE QN: <0.1 KU/L
CODFISH IGE QN: <0.1 KU/L
CRAB IGE QN: <0.1 KU/L
HAZELNUT IGE QN: <0.1 KU/L
LOBSTER IGE QN: <0.1 KU/L
ONION IGE QN: <0.1 KU/L
OYSTER IGE QN: <0.1 KU/L
PEANUT IGE QN: <0.1 KU/L
PECAN/HICK NUT IGE QN: <0.1 KU/L
PISTACHIO IGE QN: <0.1 KU/L
SALMON IGE QN: <0.1 KU/L
SCALLOP IGE QN: <0.1 KU/L
SHRIMP IGE QN: <0.1 KU/L
THYROGLOB AB SERPL-ACNC: 8.5 IU/ML (ref 0–4)
TILAPIA IGE QN: <0.1 KU/L
TROUT IGE QN: <0.1 KU/L
TUNA IGE QN: <0.1 KU/L
WALNUT IGE QN: <0.1 KU/L
WHEAT IGE QN: <0.1 KU/L

## 2024-09-06 LAB — TRYPTASE SERPL-MCNC: 5.5 UG/L

## 2024-09-09 LAB
CLASS ARA H1, VIRC: 0
CLASS ARA H2, VIRC: 0
CLASS ARA H3, VIRC: 0
CLASS ARA H8, VIRC: 0
CLASS ARA H9, VIRC: 0
PEANUT COMP. ARA H1, VIRC: <0.1 KU/L
PEANUT COMP. ARA H2, VIRC: <0.1 KU/L
PEANUT COMP. ARA H3, VIRC: <0.1 KU/L
PEANUT COMP. ARA H8, VIRC: <0.1 KU/L
PEANUT COMP. ARA H9, VIRC: <0.1 KU/L
PERCH IGE QN: <0.35 KU/L

## 2024-10-04 DIAGNOSIS — E03.8 SECONDARY HYPOTHYROIDISM: ICD-10-CM

## 2024-10-04 RX ORDER — ALPRAZOLAM 0.5 MG/1
TABLET ORAL
COMMUNITY
Start: 2024-08-15

## 2024-10-04 RX ORDER — LEVOTHYROXINE SODIUM 100 UG/1
100 CAPSULE ORAL
Qty: 30 CAPSULE | Refills: 5 | Status: SHIPPED | OUTPATIENT
Start: 2024-10-04

## 2024-10-04 NOTE — TELEPHONE ENCOUNTER
Pt is calling in for med refill on    Levothyroxine 100mcg - she is out in a couple days    LOV: 08.14.24  NOV: NONE      ALFREDO Cano

## 2024-10-08 DIAGNOSIS — D50.9 IRON DEFICIENCY ANEMIA, UNSPECIFIED IRON DEFICIENCY ANEMIA TYPE: ICD-10-CM

## 2024-10-08 DIAGNOSIS — E55.9 VITAMIN D DEFICIENCY: Primary | ICD-10-CM

## 2024-10-08 DIAGNOSIS — E61.1 IRON DEFICIENCY: ICD-10-CM

## 2024-10-10 ENCOUNTER — HOSPITAL ENCOUNTER (OUTPATIENT)
Dept: NEUROLOGY | Facility: CLINIC | Age: 39
Discharge: HOME | End: 2024-10-10
Payer: COMMERCIAL

## 2024-10-10 DIAGNOSIS — G43.009 MIGRAINE WITHOUT AURA AND WITHOUT STATUS MIGRAINOSUS, NOT INTRACTABLE: ICD-10-CM

## 2024-10-10 DIAGNOSIS — R42 DIZZINESS: ICD-10-CM

## 2024-10-10 PROCEDURE — 95923 AUTONOMIC NRV SYST FUNJ TEST: CPT | Performed by: PSYCHIATRY & NEUROLOGY

## 2024-10-10 PROCEDURE — 95924 ANS PARASYMP & SYMP W/TILT: CPT | Performed by: PSYCHIATRY & NEUROLOGY

## 2024-12-03 ENCOUNTER — OFFICE VISIT (OUTPATIENT)
Dept: NEUROLOGY | Facility: HOSPITAL | Age: 39
End: 2024-12-03
Payer: COMMERCIAL

## 2024-12-03 VITALS
HEIGHT: 62 IN | DIASTOLIC BLOOD PRESSURE: 92 MMHG | BODY MASS INDEX: 35.15 KG/M2 | HEART RATE: 61 BPM | WEIGHT: 191 LBS | SYSTOLIC BLOOD PRESSURE: 142 MMHG

## 2024-12-03 DIAGNOSIS — G43.009 MIGRAINE WITHOUT AURA AND WITHOUT STATUS MIGRAINOSUS, NOT INTRACTABLE: Primary | ICD-10-CM

## 2024-12-03 PROCEDURE — 99214 OFFICE O/P EST MOD 30 MIN: CPT | Performed by: PSYCHIATRY & NEUROLOGY

## 2024-12-03 PROCEDURE — 3008F BODY MASS INDEX DOCD: CPT | Performed by: PSYCHIATRY & NEUROLOGY

## 2024-12-03 NOTE — PATIENT INSTRUCTIONS
You were seen today by Dr. Jasmine.  It is a pleasure seeing you.    I'm glad that you have been stable since last visit.  We discussed results of brain MRI and autonomic testing which are reassuring.     PLAN:  Use sumatriptan as needed.  Please take this medication less than 10 times per month. Taken too often, there is a chance of developing medication overuse headache, which would result in a higher migraine frequency.      Common side effects include light-headed sensation, drowsiness, and restlessness. Another side effect is the sensation of chest/neck tightness that can last minutes to a few hours. You should not have any difficulty breathing or irregular heart rhythm with this side effect, and it self resolves.     Do not take this medication if you have uncontrolled blood pressure or a history of heart attack, as it can cause vasospasm.      If you do not tolerate this medication, please discontinue its use, and we can discuss alternative acute medications at your upcoming appointment.  I recommend maintaining a headache diary  Continue PT and regular exercise.  Follow-up in 6-8 months or earlier if needed    Please call [phone] if you have any questions.

## 2024-12-03 NOTE — PROGRESS NOTES
Chief Complaint: Headache     Rachelle Laurent is a 38 y.o. year old female who is here for a follow-up visit for migraine.    HPI    Rachelle is a pleasant 38 y.o. RH woman with PMHx of common variable immunodeficiency, with weekly subcutaneous IVIG, hypothyroidism, Hx DVT in 2016 anxiety, OCD,  reports 7 concussions in the last three years. She had once 01/2022 during skiing. She developed migraine in 2022 She was closing the back of the car and hit her head in 1/2022. She reports headache, confusion and nausea, was able to return to work 4 months after that.  03/2023, self-induced head injury when she hit herself in the head.  She has been on antidepressant since age 13, she was diagnosed with OCD as a teenager. She had LoC when she was 15 years old.  She reports intermittent tingling and numbness involving different areas of the body.     Interval history:  She reports improvement in frequency of headache, they occur with menstruation, she uses Tylenol and Advil as needed, no concern for medication overuse headache.  No ED visits for headache since previous visit.  Sleep is okay for most nights, appetite is normal.  She continues to have PT which has been helpful.  Mood has been stable.  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 There is no evidence of cardiac and vasomotor sympathetic dysfunction and orthostatic hypotension or exaggerated orthostatic tachycardia.   MRI brain w/wo contrast on 8/21/2024 showed no acute intracranial abnormalities, no abnormal enhancement.      Prior history:  Rachelle started getting headaches at age 36.  Headaches gradually worsening in frequency and severity over the course of 2 years. Generally, headaches last for hours up to 24 hours in duration. Patient  has 3/30 headache days per month. The headaches are usually throbbing and are located in frontal area, sometimes behind the eye, generally symmetric. The patient rates her most severe headaches a 8 in intensity. Associated nausea, photophobia, phonophobia, vestibular symptoms, and vomiting. Headaches are worsened with exertion. Triggers include  menstruation, poor air quality, stress .     Rachelle does not experience headache aura.     Work attendance or other daily activities are affected by the headaches.     Current Acute Headache Treatment Ibuprofen, Tylenol   Current Preventative Headache Treatment     Previous Acute Headache Treatment Ibuprofen, Tyenol   Previous Preventative Headache Treatment Topiramate          Social history:  No tobacco use, no alcohol, no drug use  ROS: As per HPI, otherwise all other systems have been reviewed are negative for complaint.      Review of Systems     Medical History        Past Medical History:   Diagnosis Date    Acute deep vein thrombosis (DVT) of femoral vein of right lower extremity (Multi) 12/06/2016     Formatting of this note might be different from the original. Diagnosed 10/29/2016    Chronic urticaria      CVID (common variable immunodeficiency) (Multi)      Deviated nasal septum 06/09/2013     Acquired deviated nasal septum    Obsessive-compulsive disorder, unspecified 06/09/2013     Obsessive compulsive disorder    Other conditions influencing health status 06/09/2013     Brachial Plexus Palsy    Unspecified asthma, uncomplicated ([facility]) 06/09/2013     Extrinsic asthma         Surgical History         Past Surgical History:   Procedure Laterality Date    MOUTH SURGERY   07/02/2014     Oral Surgery Tooth Extraction         Family History          Family History   Problem Relation Name Age of Onset    Coronary artery disease Mother        Depression Mother        Hypertension Mother        Aortic aneurysm Mother        Diabetes Father        Heart failure  Father        Stroke Father        Migraines Sister        Other (immune deficiency disorder) Sister        Adrenal disorder Sister        Diabetes Sister             Social History            Tobacco Use    Smoking status: Never       Passive exposure: Never    Smokeless tobacco: Never   Substance Use Topics    Alcohol use: Yes            Objective     Neuro Exam:  Cardiac Exam: No apparent edema of b/l lower extremities  Neurological Exam:  MENTAL STATUS:   General Appearance: No distress, alert, interactive, and cooperative. Orientation was normal to time, place and person. Recent and remote memory was intact.      OPHTHALMOSCOPIC:   The ophthalmoscopic exam was normal. The fundi were well visualized with normal disc margins, clear vessels and vascular pulsations. No disc edema. The cup/disk ratio was not enlarged. No hemorrhages or exudates were present in the posterior segments that were visualized.      CRANIAL NERVES:   CN 2         Visual fields full to confrontation.   CN 3, 4, 6   Pupils round, 4 mm in diameter, equally reactive to light. Lids symmetric; no ptosis. EOMs normal alignment, full range with normal saccades, pursuit and convergence.   No nystagmus.   CN 5   Facial sensation intact bilaterally.   CN 7   Normal and symmetric facial strength. Nasolabial folds symmetric.   CN 8   Hearing intact to conversation and finger rub.  CN 9, 10   Palate elevates symmetrically.  CN 11   Normal strength of shoulder shrug and neck turning.   CN 12   Tongue midline, with normal bulk and strength; no fasciculations.      MOTOR:   Muscle bulk and tone were normal in both upper and lower extremities.   No pronator drift bilaterally.  No fasciculations, tremor or other abnormal movements evident with the patient examined clothed.     STRENGTH:     R           L  Deltoid            5          5  Biceps              5          5  Triceps             5          5     Hip [id number]  Knee Flex55  Knee Ex55     REFLEXES:        R          L  Biceps              2          2                     Triceps             2          2  Patellar            2          2      SENSORY:   In both upper and lower extremities, sensation was intact to light touch.     COORDINATION:   In both upper extremities, finger-nose-finger was intact without dysmetria or overshoot.      GAIT:   Station was stable with a normal base. Gait was stable with a normal arm swing and speed. No ataxia, shuffling, steppage or waddling was present. No circumduction was present. No Romberg sign was present.     Neurological Exam  Physical Exam     Results        Lab on 06/24/2024   Component Date Value    WBC 06/24/2024 6.4     nRBC 06/24/2024 0.0     RBC 06/24/2024 4.30     Hemoglobin 06/24/2024 12.2     Hematocrit 06/24/2024 37.9     MCV 06/24/2024 88     MCH 06/24/2024 28.4     MCHC 06/24/2024 32.2     RDW 06/24/2024 13.4     Platelets 06/24/2024 269     Neutrophils % 06/24/2024 55.0     Immature Granulocytes %,* 06/24/2024 0.3     Lymphocytes % 06/24/2024 34.6     Monocytes % 06/24/2024 9.0     Eosinophils % 06/24/2024 0.3     Basophils % 06/24/2024 0.8     Neutrophils Absolute 06/24/2024 3.52     Immature Granulocytes Ab* 06/24/2024 0.02     Lymphocytes Absolute 06/24/2024 2.22     Monocytes Absolute 06/24/2024 0.58     Eosinophils Absolute 06/24/2024 0.02     Basophils Absolute 06/24/2024 0.05     Iron 06/24/2024 68     UIBC 06/24/2024 317     TIBC 06/24/2024 385     % Saturation 06/24/2024 18 (L)     Transferrin 06/24/2024 286     Vitamin D, 25-Hydroxy, T* 06/24/2024 32            Assessment/Plan  Rachelle is a 38 y.o. RH woman with PMHx of common variable immunodeficiency, with weekly subcutaneous IVIG, hypothyroidism, Hx DVT in 2016 anxiety, OCD,  reports 7 concussions in the last three years. She had once 01/2022 during skiing. She developed migraine in 2022 She was closing the back of the car and hit her head in 1/2022. She reports headache, lightheadedness  and heart racing when she stands up for long time, confusion and nausea, was able to return to work 4 months after that. 03/2023, self-induced head injury when she hit herself in the head. Neurological examination is non-focal. Given the history and today's evaluation, I suspect migraine without aura, differential diagnosis include post concussion syndrome, and medication overuse headache, functional overlay is possible , a multifactorial etiology can't be entirely ruled out, she continues PT for concussion, headaches have much improved, we reviewed autonomic testing result with findings suggestive of small fiber neuropathy and brain MRI w/wo contrast which didn't how any acute intracranial abnormalities.     PLAN:  Use sumatriptan as needed.  Please take this medication less than 10 times per month. Taken too often, there is a chance of developing medication overuse headache, which would result in a higher migraine frequency.      Common side effects include light-headed sensation, drowsiness, and restlessness. Another side effect is the sensation of chest/neck tightness that can last minutes to a few hours. You should not have any difficulty breathing or irregular heart rhythm with this side effect, and it self resolves.     Do not take this medication if you have uncontrolled blood pressure or a history of heart attack, as it can cause vasospasm.      If you do not tolerate this medication, please discontinue its use, and we can discuss alternative acute medications at your upcoming appointment.  I recommend maintaining a headache diary  Continue PT and regular exercise.  Follow-up in 6-8 months or earlier if needed    Please call [phone] if you have any questions.         To prevent medication overuse headache:  ·Take your headache medication as prescribed.  ·Avoid medications that contain butalbital or opioids.  ·Use OTC painkillers less than 15 days a month.  ·Limit use of triptans or combination  analgesics to no more than nine days a month.     General guidelines that can help prevent most headaches:  ·Avoid headache triggers: for example dark chocolate, aged cheese and diet fizzy drinks. Keep a headache diary with details about every headache. Eventually, you may see a pattern.  ·Get enough sleep. Go to bed and wake up at the same time every day - even on weekends.  ·Stay hydrated. Be sure to drink plenty of water or other uncaffeinated fluids.  ·            Exercise regularly and reduce stress.   ·             Lose weight. Obesity can contribute to headache development, so if you need to lose weight, find a program that works for you.  ·            Quit smoking. If you smoke, talk to your doctor about quitting. Smoking is linked to a higher risk of medication overuse headache.   Please call [phone] if you have any questions.

## 2024-12-10 ENCOUNTER — OFFICE VISIT (OUTPATIENT)
Dept: ORTHOPEDIC SURGERY | Facility: CLINIC | Age: 39
End: 2024-12-10
Payer: COMMERCIAL

## 2024-12-10 VITALS
OXYGEN SATURATION: 99 % | DIASTOLIC BLOOD PRESSURE: 87 MMHG | RESPIRATION RATE: 18 BRPM | SYSTOLIC BLOOD PRESSURE: 123 MMHG | WEIGHT: 191 LBS | BODY MASS INDEX: 35.15 KG/M2 | HEIGHT: 62 IN

## 2024-12-10 DIAGNOSIS — S06.0X0A CONCUSSION WITHOUT LOSS OF CONSCIOUSNESS, INITIAL ENCOUNTER: Primary | ICD-10-CM

## 2024-12-10 DIAGNOSIS — S16.1XXA CERVICAL STRAIN, ACUTE, INITIAL ENCOUNTER: ICD-10-CM

## 2024-12-10 PROCEDURE — 1036F TOBACCO NON-USER: CPT | Performed by: PEDIATRICS

## 2024-12-10 PROCEDURE — 99214 OFFICE O/P EST MOD 30 MIN: CPT | Performed by: PEDIATRICS

## 2024-12-10 PROCEDURE — 3008F BODY MASS INDEX DOCD: CPT | Performed by: PEDIATRICS

## 2024-12-10 ASSESSMENT — PAIN SCALES - GENERAL: PAINLEVEL_OUTOF10: 2

## 2024-12-10 NOTE — PROGRESS NOTES
"Chief Complaint: head injury / possible Concussion  Consulting physician: DIONNE Ornelas    A report with my findings and recommendations will be sent to the referring physician via written or electronic means when information is available    Concussion History:    Rachelle Laurent is a 38 y.o. female who presented on 12/10/2024  for consultation of a head injury.    Date of injury: 12/6/24  Injury mechanism: mother was coming in for a forced hug, bumped R temple into mother side of head, had immediate pain where she was hit, then while driving home approx 10 m later got a h/a, sensitive to light, difficulty concentrating. Sat went for a short walk, felt dizzy and off balance.  Developed neck pain, used heat.  Sat evening crying and emotionally, having a hard time dealing.  Sun sx improved, but Mon sx increased.  Pulsating in R ear, flashes of light in L eye. Today h/a slightly better, still feeling foggy and \"out of it\".      Prior evaluation(s) / imaging performed: none  Last concussion 7/2024, did not feel like fully recovered, still had symptom score 5  Reports this is 3rd concussion in past 12 months and 8th within 3 years  Recently diagnosed with common variable immunodeficiency and Hashimoto's, does feel like that is complicating her recovery course  Has been unable to work last 2 years due to medical conditions      SYMPTOM SCALE:  # sx (of 22):  14     total score (of 132): 23  (See scanned sheet)    If 100% is normal, what percent do you feel now? 50%  Why? H/a, sensitive to light and sound, flashing of light in L eye    PRIOR CONCUSSION HISTORY:   Yes, 3rd one this year    CONFOUNDING ISSUES:   Confounding Factors  OCD and small fiber neuropathy , hashimoto disease    HEADACHE HISTORY:  Does headache wake you from sleep? First night  Is headache present when you wake up? Only today  What makes the headache worse? Light and sound, screens   Is it constant / intermittent? intermittent  Does " "it ever go away? yes  Any vomiting since the time of injury? no    SLEEP:  How are you sleeping? No issues falling asleep, actually waking up less than normal  Are you more fatigued during the (school) day than normal?  yes  Are you napping; if yes, how often and how long?  Yes, just Sat 3 hours x 1    MOOD HX:   Are you irritable, depressed, anxious, or stressed - yes to all  Do you see anyone for counseling or have you in the past? yes  Any thoughts of hurting yourself? Fri and Sat, thoughts resolved    SCHOOL / COGNITIVE FUNCTION:  Can you read or concentrate without having any difficulty? no  Do your symptoms worsen with mental activity? yes  Can you tolerated 30 minutes of homework without significant symptom worsening? No, difficulty concentrationg  Have you been attending school full time since the injury?: not currently working  Are you using any academic accommodations? N/a    SCREEN TIME:  How much are you on a screen? Able to tolerate only a few min  What activities do you do on a screen? Watch TV, going through emails  Do you get symptoms with screen use? TV, computer    SPORT/EXERCISE:  Are you exercising? Walking outside  Do your symptoms worsen with exercise?usually feels better, but did not have any change after walk    Other important information:   Reports this is 3rd concussion in past 12 months and 8th within 3 years  Recently diagnosed with common variable immunodeficiency and Hashimoto's, does feel like that is complicating her recovery course  Has been unable to work last 2 years due to medical conditions    PHYSICAL EXAM    Visit Vitals  /87 (BP Location: Right arm, Patient Position: Sitting)   Resp 18   Ht 1.575 m (5' 2\")   Wt 86.6 kg (191 lb)   SpO2 99%   BMI 34.93 kg/m²   OB Status Having periods   Smoking Status Never   BSA 1.95 m²       General  Constitutional: normal, well appearing  Psychiatric: normal mood and affect  Skin: unremarkable  Cardiovascular: no edema in " extremities    Head  Inspection: Atraumatic, no bruising or swelling  Palpation: non-tender     ENT  External inspection of ears, nose, mouth: normal  Hearing: normal  Oropharynx: normal soft palate rise    Optho / Vestibular   Pupils equal and reactive  Convergence: double vision at 2 cm  No nystagmus present  Smooth Pursuits -symptom exacerbation : no  Saccades horizontal - symptom exacerbation: yes  Saccades vertical - symptom exacerbation no  VOR horizontal (head rotation)- symptom exacerbation yes  VMS (80 degree trunk rotation side to side) -symptom exacerbation: yes    Cervical Spine Exam  Palpation:  Muscle spasm: negative   Midline tenderness: negative   Paravertebral tenderness: negative     Range of Motion:  Flexion (50-70) full, pain free  Extension (60-85) full, pain free  Right lateral flexion (40-50)  full, pain free  Left lateral flexion (40-50)  full, pain free  Right rotation (60-75), full, pain free  Left rotation (60-75), full, pain free    Neuro  Limb tone: normal   Deep tendon reflexes: Symmetric and normal  Sensation to light touch: normal  Finger to nose: normal  Fast alternating movements: normal   Cranial nerves: II thru XII are intact   Tandem gait: normal    Strength  Full strength in UE  Full strength in LE    Modified GUY  Foot tested:   L     Double leg stance:       0      Single leg stance:      3      Tandem stance: 2    Cognitive Testing  Deferred    Discussion  Rachelle Laurent is a 38 y.o. female with 7 prior concussions, history OCD, depression, migraine disroder being followed by neurology, Hashimotos, and a autoimmune disorder who was seen on  12/10/2024 for consultation of a head injury sustained on 12/6. Patient was injured when accidentally butted heads with her mother during a hug.     12/10/2024 PCS score: 23, 14 symptoms, feels back to 50% of nl   in the room and extremely supportive, she did just see neurology but her concussion was after that visit last  week. She reports feeling down this weekend and has an appointment with her psychologist at 11am (in 15 minutes from my physical exam). She has a great relationship with her psychologist and feel comfortable and safe continuing to followup outpatient, she has no intentions to kill herself.    Conservative care guidelines were discussed with the patient (and family members present) and the following was reviewed:    We discussed the pathophysiology, diagnosis, and treatment of concussion. We do not categorize concussions in terms of severity or grade. This was reviewed with the family. We did also review that individuals who suffer a concussion will be at increased likelihood for suffering additional concussions in the future. We treat concussions using modifications of physical and cognitive activity as well as electronic use. We do not recommend completely shutting down and sitting in a dark room doing nothing - this slows recovery.    The following treatment recommendations were made to help speed your recovery:    IF YOUR SYMPTOMS GO AWAY COMPLETELY AND YOU FEEL 100% FOR 24 HOURS, PLEASE CALL THE OFFICE -[phone].  The [facility] requires a gradual return to full play for sports. We can tell you how to start the progression as soon as the concussion symptoms are gone. So please contact us.   Avoid activity that puts you at risk for hitting your head. Your balance and reaction time are likely affected from your concussion. Be extra careful.  If you are a licensed , we recommend no driving within the first 72 hours after the head injury. After that - consider avoiding driving until you feel you can focus appropriately, move your head side to side with no dizziness or neck pain, and have tolerable light sensitivity.   Medications: Tylenol 500 mg by mouth every 4 hours as needed for headache was prescribed.  Physical activity:   Daily walking is encouraged. A minimum of 15 minutes / day is recommended.  Multiple sessions of 15 min / day is recommended if possible.  Walk during gym class / sports practice, but avoid any situation where you could accidentally hit your head.   Take a walk if you come home from school and you feel tired.  As soon as you feel well enough you can start walk / jog intervals or light stationary biking that does not cause more than a mild and brief increase in your symptoms. Your goal should be to exercise around 55% of your max HR. As symptoms improve, you can increase intensity up to 70% of your max HR as long as it does not cause more than a mild and brief increase in your symptoms.  Cognitive participation:   Return to full day of activities within 3 days of the concussion if possible. You may start with a half day if needed.   Take breaks of 15-20 minutes if your symptoms worsen. Rest in a quiet place and try to return to work.   Don't fall behind on tasks. Break your work up into 30 minute sessions and take breaks.   Avoid loud places    Avoid activity where you could accidentally hit your head.   Consider printing any tasks on paper to do as much as possible. If you have to use a screen - dim the brightness / increase font size and take breaks if symptoms worsen.   Electronics:   Avoid video games and scrolling on social media. Apps with a lot of swiping / scrolling up and down can make symptoms worse.  Use your electronic devices to stay connected with friends through video chat (look away from screen) and occasional texting.   If you stream videos, turn the screen away from you and listen to them.  Listen to music, audiobooks, podcasts as much as you want.  Consider downloading a meditation matt and use this daily.   When you have to use a computer - dim the brightness, increase font size, and take breaks as needed.  Sleep:   Sleep as much as you need in the first 48 hours after the head injury.   48 hours after the head injury, get on a good sleep schedule and go to bed earlier  than usual if you are tired. Avoid taking a nap after the first 48 hours.   Avoid sleep overs with friends / staying up late / sleeping in excessively.   If you have trouble falling asleep - consider an age appropriate dose of melatonin 1 hour before bed.   Teach your body that your bed is for sleep only and avoid doing homework or other activity in bed.   Sunglasses and a hat can be used for light sensitivity. Earplugs can be used for noise sensitivity.    Jose Bullock MD  Sports Medicine Fellow   [facility]    The patient and their family were given the opportunity to ask further questions.   Referral discussed for pain management for chronic continued neck pain that was present prior to this concussion with pt reporting decreased cervical disc spaces.  Follow-up in one week.

## 2024-12-30 ENCOUNTER — APPOINTMENT (OUTPATIENT)
Dept: ENDOCRINOLOGY | Facility: CLINIC | Age: 39
End: 2024-12-30
Payer: COMMERCIAL

## 2025-01-03 ENCOUNTER — TELEMEDICINE (OUTPATIENT)
Dept: PRIMARY CARE | Facility: CLINIC | Age: 40
End: 2025-01-03
Payer: COMMERCIAL

## 2025-01-03 DIAGNOSIS — F07.81 POST CONCUSSIVE SYNDROME: Primary | ICD-10-CM

## 2025-01-03 DIAGNOSIS — F43.21 SITUATIONAL DEPRESSION: ICD-10-CM

## 2025-01-03 DIAGNOSIS — F41.9 ANXIETY: ICD-10-CM

## 2025-01-03 RX ORDER — IBUPROFEN 200 MG
200 TABLET ORAL EVERY 6 HOURS
COMMUNITY

## 2025-01-03 ASSESSMENT — ENCOUNTER SYMPTOMS
WEAKNESS: 0
FATIGUE: 1
FOCAL WEAKNESS: 0
CLUMSINESS: 1
NAUSEA: 1
LOSS OF BALANCE: 1
ABDOMINAL PAIN: 0
VISUAL CHANGE: 1
BOWEL INCONTINENCE: 0
DIAPHORESIS: 0
FEVER: 0
MEMORY LOSS: 1
VOMITING: 0
CONFUSION: 0
BACK PAIN: 1
FOCAL SENSORY LOSS: 0
SLURRED SPEECH: 0
AURA: 1
DIZZINESS: 1
PALPITATIONS: 0
HEADACHES: 1
LIGHT-HEADEDNESS: 1
VERTIGO: 1
SHORTNESS OF BREATH: 0
NEUROLOGIC COMPLAINT: 1
ALTERED MENTAL STATUS: 0
NEAR-SYNCOPE: 0
NECK PAIN: 1

## 2025-01-03 ASSESSMENT — PATIENT HEALTH QUESTIONNAIRE - PHQ9
1. LITTLE INTEREST OR PLEASURE IN DOING THINGS: NOT AT ALL
2. FEELING DOWN, DEPRESSED OR HOPELESS: MORE THAN HALF THE DAYS
SUM OF ALL RESPONSES TO PHQ9 QUESTIONS 1 AND 2: 2

## 2025-01-03 NOTE — PROGRESS NOTES
Subjective   Rachelle Laurent is a 39 y.o. female who presents for Referral- virtually (Pt is in [place] and would like referral to [facility] in [place]. ).    Virtual or Telephone Consent    An interactive audio and video telecommunication system which permits real time communications between the patient (at the originating site) and provider (at the distant site) was utilized to provide this telehealth service.   Verbal consent was requested and obtained from Rachelle Laurent on this date, 01/04/25 for a telehealth visit.      Acute Neurological Problem  The patient's primary symptoms include clumsiness, a loss of balance, memory loss and a visual change. The patient's pertinent negatives include no altered mental status, focal sensory loss, focal weakness, near-syncope, slurred speech, syncope or weakness. This is a new problem. The current episode started more than 1 month ago. The neurological problem developed suddenly. The problem has been waxing and waning since onset. There was facial focality noted. Associated symptoms include an auditory change, an aura, back pain, dizziness, fatigue, headaches, light-headedness, nausea, neck pain and vertigo. Pertinent negatives include no abdominal pain, bladder incontinence, bowel incontinence, chest pain, confusion, diaphoresis, fever, palpitations, shortness of breath or vomiting. Past treatments include acetaminophen, bed rest, drinking, eating, neck support, sleep and walking. The treatment provided significant relief.     She presents today virtually to discuss ongoing post- concussive symptoms. She is requesting a referral to a neurology group she found in [place].   She reports in the past 3 years she has had 8 concussions. This year she has had 3.  The first one was in the spring when trying to get her dog out of car and the dogs head/neck hit her in the side of the head.  Then on 7/2024 she went to  her phone and hit the right  "side of her temple.  She has seen sports medicine after each concussion and saw neuro after concussion in the summer.   She had an MRI and also autonomic testing.  She reports it took a really long time for the symptoms to finally subside from March to September.  In early December went to hug mom and their heads bumped each other.  Since then has been miserable.   She reports she cannot drive far.  She has light and sound sensitivity.  (+) flashing lights at times  (+) tinnitus  Has to wear earplugs and sunglasses.  (+) dizziness/balance issues  (+) headaches all the time- tries to avoid taking too many OTC due to prior medication overuse headaches but has resorted to Tylenol due to being so uncomfortable.  She is still following with PT for her concussion.    She also feels it is affecting her mental health. She does report this week has been better than last week.  (+) irritable, angry, frustrated and depressed.  (+) feeling sad and down.  No significant anxiety.  No self harm  No HI.  No SI- passive thoughts \" Maybe I'll just lay in the snow and die of hypothermia.\"  Reports her biggest fear is dying.  She is following with both psychology and psychiatry.  Has an appt with psychiatry next week  Saw psychology Tuesday and is following once a week.    Review of Systems   Constitutional:  Positive for fatigue. Negative for diaphoresis and fever.   Respiratory:  Negative for shortness of breath.    Cardiovascular:  Negative for chest pain, palpitations and near-syncope.   Gastrointestinal:  Positive for nausea. Negative for abdominal pain, bowel incontinence and vomiting.   Genitourinary:  Negative for bladder incontinence.   Musculoskeletal:  Positive for back pain and neck pain.   Neurological:  Positive for dizziness, vertigo, light-headedness, headaches and loss of balance. Negative for focal weakness, syncope and weakness.   Psychiatric/Behavioral:  Positive for memory loss. Negative for confusion.  "     Objective   There were no vitals taken for this visit.    Physical Exam  Constitutional:       General: She is not in acute distress.     Appearance: Normal appearance. She is not toxic-appearing.   Pulmonary:      Effort: Pulmonary effort is normal. No respiratory distress.   Neurological:      Mental Status: She is alert and oriented to person, place, and time.   Psychiatric:         Mood and Affect: Mood normal.         Behavior: Behavior normal.         Thought Content: Thought content normal.         Judgment: Judgment normal.         Assessment/Plan   Problem List Items Addressed This Visit       Anxiety    Post concussive syndrome - Primary    Relevant Orders    Referral to Neurology     Other Visit Diagnoses       Situational depression              Recommend continuing to follow with psychiatry and psychology. If she feels she needs additional assistance she was given the information for [facility] as well.   Referral to neurology provided today as well per request.    It has been a pleasure seeing you today!

## 2025-01-04 ASSESSMENT — ENCOUNTER SYMPTOMS
CONFUSION: 0
NEAR-SYNCOPE: 0
LIGHT-HEADEDNESS: 1
SLURRED SPEECH: 0
VOMITING: 0
DIZZINESS: 1
FOCAL SENSORY LOSS: 0
HEADACHES: 1
MEMORY LOSS: 1
BOWEL INCONTINENCE: 0
CLUMSINESS: 1
BACK PAIN: 1
LOSS OF BALANCE: 1
VERTIGO: 1
NECK PAIN: 1
FATIGUE: 1
FEVER: 0
SHORTNESS OF BREATH: 0
WEAKNESS: 0
VISUAL CHANGE: 1
NAUSEA: 1
ABDOMINAL PAIN: 0
AURA: 1
ALTERED MENTAL STATUS: 0
DIAPHORESIS: 0
PALPITATIONS: 0
NEUROLOGIC COMPLAINT: 1
FOCAL WEAKNESS: 0

## 2025-01-07 DIAGNOSIS — G43.009 MIGRAINE WITHOUT AURA AND WITHOUT STATUS MIGRAINOSUS, NOT INTRACTABLE: ICD-10-CM

## 2025-01-07 DIAGNOSIS — R42 DIZZINESS: Primary | ICD-10-CM

## 2025-01-07 NOTE — PROGRESS NOTES
Colten Bush and Sharath,   I placed a referral to neuropsychological rehab, I can't start you on topiramate as you mentioned that it worsened your mood in the past, you should follow with your headache specialist for other options, I also recommend to continue close follow up with psychiatry and sports medicine.  Please let me know if you have other questions.    Best regards,  Grace

## 2025-01-09 DIAGNOSIS — R20.2 PARESTHESIA: ICD-10-CM

## 2025-01-09 DIAGNOSIS — R42 DIZZINESS: Primary | ICD-10-CM

## 2025-01-09 DIAGNOSIS — R51.9 NONINTRACTABLE HEADACHE, UNSPECIFIED CHRONICITY PATTERN, UNSPECIFIED HEADACHE TYPE: ICD-10-CM

## 2025-01-15 NOTE — PROGRESS NOTES
**THIS REPORT HAS NOT BEEN REVIEWED AND SIGNED BY PRECEPTING AUDIOLOGIST. NOT FINAL REPORT.**  ADULT BALANCE FUNCTION TEST (BFT)      Name:  Rachelle Laurent  :  1985  Age:  39 y.o.  Date of Evaluation:  ***    IMPRESSIONS     Overall normal vestibular examination; no evidence of active vestibular system involvement to account for patient reported symptoms. There were no indications of peripheral or central vestibular system pathway involvement. There were no indications of active Benign Paroxysmal Positional Vertigo (BPPV) present. Normal observation of gait and transfers. Bedside postural control findings demonstrate normal functional balance with varying sensory information measured on the mCTSIB. Patient's risk of falling based on today's evaluation is low.    RECOMMENDATIONS     Consider re-evaluation as medically indicated.  Maintain a healthy lifestyle to help body function overall.  Continue monitoring per ENT/PCP preference.    Time: ***    HISTORY     Patient was seen for Balance Function Testing (BFT) due to a history of dizziness/imbalance. Vestibular case history collected via patient-clinician interview, patient chart review, and patient questionnaires.    Patient reported history of dizziness described as ***.  Symptoms began ***.  Episodes occur *** and last several *** before subsiding.  Most recent episode occurred ***.  Associated symptoms include ***.  Symptoms are provoked by ***.  Symptoms are alleviated by ***.  Overall the patient's symptoms have *** over time.  Patient is currently in vestibular physical therapy with emphasis on ***.   This patient has had a total of *** falls due to their symptoms.   Denied any symptoms provoked by sneezing or coughing, as well as any presence of autophony.   Denied any recent medication changes.   Relevant medical history includes multiple concussions, headaches/migraines, thyroiditis, asthma, .  Most recent audiologic evaluation performed on  *** by *** revealed ***. Tympanometry revealed normal eardrum mobility and canal volume, bilaterally.  Most recent vertigo evaluation performed on 1/7/25 by Josiah Chung MD revealed negative Romberg, gait within normal limits, and normal saccades and pursuit.   Patient reported complying with pre-test instructions.     TEST RESULTS     BEDSIDE ASSESSMENT TEST  The bedside assessment is an optional portion of the test battery to further assist in differential diagnosis and screen for eye abnormalities which may affect testing.    Otoscopy: {CERUMEN2:45949}  Tympanometry: normal. ear canal volume, peak pressure, and compliance, consistent with normal middle ear function (Type A), bilaterally.***  Extra-Ocular Range of Motion: normal. Extra-Ocular Range of Motion is performed to evaluate any eye abnormalities prior to testing.  Cover/Uncover: normal. Cover/Uncover test is performed to evaluate for skewed deviation of the eyes prior to testing.  Cervical Neck Exam: normal. Cervical Neck is performed to evaluate any restrictions or pain with neck movement prior to testing.  Vertebral Artery Screen: normal. Vertebral Artery Screen is performed to evaluate for vertebrobasilar insufficiency prior to testing.   Ocular Counter-Roll: normal. Ocular Counter-Roll is performed to evaluate ocular tilt reaction and assess otolith organ function prior to testing.  VOR Cancellation: normal. VOR Cancellation is performed to evaluate fixation suppression prior to testing.  Dynamic Visual Acuity: normal. DVA is performed to evaluate the VOR and visual/vestibular interaction.***  Modified Clinical Test of Sensory Interaction on Balance (mCTSIB): normal. mCTSIB is performed to evaluate balance with varying sensory information.      VIDEONYSTAGMOGRAPHY (VNG) TEST  VNG provides objective indications of peripheral and central vestibulo-ocular pathway involvement. Ocular motor testing to visually guided targets is conducted using a dual  channel video-recording technique for the recording of eye movement in the horizontal and vertical planes. Air caloric testing is performed at 48 degrees C and 24 degrees C.    Spontaneous Nystagmus was absent. Spontaneous nystagmus testing may help with the identification of an acute or uncompensated peripheral vestibular lesion.   Gaze Nystagmus test was normal. Gaze nystagmus testing is to evaluate for nystagmus that is evoked by holding eye gaze in any particular direction. True gaze nystagmus is amplified when vision is denied.   Random Saccades test was normal. Random saccade testing is to evaluate patient's ability to make fast random eye movements along a horizontal moving target.   Smooth Pursuit/Tracking test was normal. Smooth pursuit/tracking testing is to evaluate the ability to move eyes with a single smoothly moving target.   Optokinetic nystagmus testing was normal at ***d/s. This full-field OPK test is to evaluate the ability of central nervous system to stabilize vision during sustained head movement after the VOR system loses effectiveness.   [place]-Hallpike testing was normal. [place]-Hallpike testing is to provide a diagnosis of Benign Paroxysmal Positional Vertigo (BPPV) of the vertical semicircular canals on the side which is most affected.  Roll testing was normal. Roll testing is to provide a diagnosis of Benign Paroxysmal Positional Vertigo (BPPV) of the horizontal semicircular canals on the side which is most affected.  Positional testing was normal. Positional testing is to evaluate patient's ability to hold a steady gaze while in different positions.  *** caloric testing was normal. Unilateral weakness of ***% to the *** which is normal and a directional preponderance of ***% to the *** which is normal. Caloric testing is to evaluate for peripheral vestibular lesion.    NOTE: monothermal caloric testing is indicated when:  Normal vHIT tracings, indicating appropriate horizontal canal  "function  Normal oVEMP testing, indicating appropriate superior nerve function  No central indications throughout oculomotor testing  Non-significant spontaneous or positional nystagmus observed (less than 6 d/s)  Caloric Slow Phase Velocity equal to 8 degrees or greater in each ear  Less than 15% caloric difference between the ears      VIDEO HEAD IMPULSE TEST (vHIT)  The vHIT procedure provides objective assessment of the high frequency vestibulo-ocular reflex (VOR) for each semicircular canal. Rapid, random horizontal and vertical thrusts are applied to the patient's head to provoke the VOR. The vHIT procedure includes two separate paradigms: Head Impulse Paradigm (HIMP) and Suppression Head Impulse Paradigm (SHIMP). SHIMP is an optional paradigm that is not appropriate to perform for every patient. However, it is appropriate to perform SHIMP when there is verified evidence of possible vestibulopathy in the traditional HIMP test.     Head Impulse Paradigm (HIMP)   Right Ear   Canal Gain Overt Saccades Covert Saccades   Lateral *** absent absent   Anterior *** absent absent   Posterior *** absent absent        Head Impulse Paradigm (HIMP)   Left Ear   Canal Gain Overt Saccades Covert Saccades   Lateral *** absent absent   Anterior *** absent absent   Posterior *** absent absent     {VHITGAIN:71843::\"Total gain for all canals tested was within normal limits \"} (<0.80 is abnormal for lateral, <0.70 is abnormal for vertical).  {HIMPSACCADES:60396::\"There was no evidence of overt or covert saccades throughout testing.\"}      Suppression Head Impulse Paradigm (SHIMP)    Canal Gain Corrective Anti-Compensatory Saccades   Right Lateral *** present   Left Lateral *** present     {VHITGAIN:28219::\"Total gain for all canals tested was within normal limits \"} (<0.80 is abnormal for lateral).  {SHIMPSACCADES:68188::\"There was evidence of appropriate corrective anti-compensatory saccades throughout testing.\"}      CERVICAL " "VESTIBULAR EVOKED MYOGENIC POTENTIALS (cVEMP):  The cVEMP procedure is an evoked potential used to test the saccule and its afferent pathway. An asymmetry ratio is utilized to determine side of lesion. The cVEMP was recorded with the patient cervical extension to produce isolated contraction of the ipsilateral sternocleidomastoid (SCM) muscle. The cVEMP was recorded using a 500 Hz tone burst or 1000 Hz tone burst at a rate of 5.1.      Ear Presentation Level Amplitude P1 Latency N1 Latency  Amplitude Asymmetry Ratio   Right *** dB nHL *** µV *** ms *** ms ***%   Left *** dB nHL *** µV *** ms *** ms       {cVEMP:54460::\"Replicable cVEMP responses were within normal limits, bilaterally. \"}The amplitude asymmetry ratio of ***% was not significant (>33% = abnormal).     Superior Canal Dehiscence Screening (75 dB nHL): Negative bilaterally        OCULAR VESTIBULAR EVOKED MYOGENIC POTENTIALS (oVEMP)  The oVEMP procedure is an evoked potential used to test the utricle and its afferent pathway. An asymmetry ratio is utilized to determine side of lesion. This is a contralateral recording. The oVEMP was recorded with the patient seated upright with eyes tilted upward to produce isolated contraction of the contralateral inferior oblique muscle. The oVEMP were recorded using a 500 Hz, 1000 Hz, 4000 Hz tone burst at a rate of 5.1.       Ear Presentation Level Amplitude N1 Latency  P1 Latency  Amplitude Asymmetry Ratio   Right *** dB nHL *** µV *** ms *** ms ***%   Left *** dB nHL *** µV *** ms *** ms       {oVEMP:13651::\"Replicable oVEMP responses were within normal limits, bilaterally. \"} The amplitude asymmetry ratio of ***% was not significant (>33% = abnormal).     Meniere's Disease Screening (1000 Hz): Negative bilaterally  Superior Canal Dehiscence Screening (4000 Hz): Negative bilaterally      Testing and interpretation of results completed by MATTHEW Null and Kris Wright CCC-[facility]. It was our pleasure to evaluate " this patient.     I verify that I have reviewed the history, test results, and interpretation for this patient.      Kris Wright, CCC-A  Senior Clinical Vestibular Audiologist

## 2025-01-17 ENCOUNTER — APPOINTMENT (OUTPATIENT)
Dept: AUDIOLOGY | Facility: CLINIC | Age: 40
End: 2025-01-17
Payer: COMMERCIAL

## 2025-01-21 ENCOUNTER — APPOINTMENT (OUTPATIENT)
Dept: AUDIOLOGY | Facility: CLINIC | Age: 40
End: 2025-01-21
Payer: COMMERCIAL

## 2025-02-13 ENCOUNTER — OFFICE VISIT (OUTPATIENT)
Dept: PRIMARY CARE | Facility: CLINIC | Age: 40
End: 2025-02-13
Payer: COMMERCIAL

## 2025-02-13 VITALS
WEIGHT: 197 LBS | HEIGHT: 62 IN | BODY MASS INDEX: 36.25 KG/M2 | SYSTOLIC BLOOD PRESSURE: 130 MMHG | TEMPERATURE: 98.3 F | DIASTOLIC BLOOD PRESSURE: 82 MMHG

## 2025-02-13 DIAGNOSIS — E03.9 HYPOTHYROIDISM, UNSPECIFIED TYPE: Primary | ICD-10-CM

## 2025-02-13 DIAGNOSIS — F41.9 ANXIETY: ICD-10-CM

## 2025-02-13 DIAGNOSIS — D83.9 CVID (COMMON VARIABLE IMMUNODEFICIENCY): ICD-10-CM

## 2025-02-13 PROBLEM — R20.8 BURNING SENSATION: Status: ACTIVE | Noted: 2025-02-13

## 2025-02-13 PROBLEM — D80.1 COMMON VARIABLE AGAMMAGLOBULINEMIA (MULTI): Status: ACTIVE | Noted: 2025-02-13

## 2025-02-13 PROBLEM — M99.03 SOMATIC DYSFUNCTION OF LUMBAR REGION: Status: ACTIVE | Noted: 2025-02-13

## 2025-02-13 PROBLEM — R59.9 ADENOPATHY: Status: ACTIVE | Noted: 2025-02-13

## 2025-02-13 PROBLEM — J01.90 ACUTE SINUSITIS: Status: ACTIVE | Noted: 2025-02-13

## 2025-02-13 PROBLEM — E66.9 OBESITY WITH BODY MASS INDEX 30 OR GREATER: Status: ACTIVE | Noted: 2025-02-13

## 2025-02-13 PROBLEM — M99.06 SOMATIC DYSFUNCTION OF LOWER EXTREMITY: Status: ACTIVE | Noted: 2025-02-13

## 2025-02-13 PROBLEM — J34.2 ACQUIRED DEVIATED NASAL SEPTUM: Status: ACTIVE | Noted: 2025-02-13

## 2025-02-13 PROBLEM — F07.81 POSTCONCUSSION SYNDROME: Status: ACTIVE | Noted: 2025-02-13

## 2025-02-13 PROBLEM — N89.8 PRURITUS OF VAGINA: Status: ACTIVE | Noted: 2025-02-13

## 2025-02-13 PROBLEM — I87.2 PERIPHERAL VENOUS INSUFFICIENCY: Status: ACTIVE | Noted: 2025-02-13

## 2025-02-13 PROBLEM — N34.2 URETHRITIS: Status: ACTIVE | Noted: 2025-02-13

## 2025-02-13 PROBLEM — S16.1XXA STRAIN OF NECK MUSCLE: Status: ACTIVE | Noted: 2025-02-13

## 2025-02-13 PROBLEM — M25.579 ANKLE PAIN: Status: ACTIVE | Noted: 2025-02-13

## 2025-02-13 PROBLEM — F42.9 OBSESSIVE-COMPULSIVE DISORDER: Status: ACTIVE | Noted: 2025-02-13

## 2025-02-13 PROBLEM — R07.9 CHEST PAIN: Status: ACTIVE | Noted: 2025-02-13

## 2025-02-13 PROBLEM — M99.01 SOMATIC DYSFUNCTION OF CERVICAL REGION: Status: ACTIVE | Noted: 2025-02-13

## 2025-02-13 PROBLEM — M99.05 SOMATIC DYSFUNCTION OF PELVIS REGION: Status: ACTIVE | Noted: 2025-02-13

## 2025-02-13 PROBLEM — S09.90XA INJURY OF HEAD: Status: ACTIVE | Noted: 2025-02-13

## 2025-02-13 PROBLEM — R45.88 NON-SUICIDAL SELF-HARM (MULTI): Status: ACTIVE | Noted: 2025-02-13

## 2025-02-13 PROBLEM — E66.9 OBESITY: Status: ACTIVE | Noted: 2025-02-13

## 2025-02-13 PROBLEM — Z86.19 HISTORY OF VIRAL INFECTION: Status: ACTIVE | Noted: 2025-02-13

## 2025-02-13 PROBLEM — M79.604 PAIN OF RIGHT LOWER EXTREMITY: Status: ACTIVE | Noted: 2025-02-13

## 2025-02-13 PROBLEM — R19.8 ALTERED BOWEL FUNCTION: Status: ACTIVE | Noted: 2025-02-13

## 2025-02-13 PROBLEM — M24.819 SNAPPING SCAPULA SYNDROME: Status: ACTIVE | Noted: 2025-02-13

## 2025-02-13 PROBLEM — N64.4 BREAST PAIN: Status: ACTIVE | Noted: 2025-02-13

## 2025-02-13 PROBLEM — I87.001 POST-THROMBOTIC SYNDROME OF RIGHT LOWER EXTREMITY: Status: ACTIVE | Noted: 2023-05-08

## 2025-02-13 PROBLEM — M25.529 ARTHRALGIA OF ELBOW: Status: ACTIVE | Noted: 2025-02-13

## 2025-02-13 PROBLEM — T14.8XXA COMPRESSION INJURY OF NERVE: Status: ACTIVE | Noted: 2025-02-13

## 2025-02-13 PROBLEM — R10.13 EPIGASTRIC PAIN: Status: ACTIVE | Noted: 2025-02-13

## 2025-02-13 PROBLEM — N76.0 VAGINITIS: Status: ACTIVE | Noted: 2025-02-13

## 2025-02-13 PROBLEM — Z86.718 HISTORY OF THROMBOSIS: Status: ACTIVE | Noted: 2025-02-13

## 2025-02-13 PROBLEM — M77.9 TENDINITIS: Status: ACTIVE | Noted: 2025-02-13

## 2025-02-13 PROBLEM — K21.9 GASTROESOPHAGEAL REFLUX DISEASE: Status: ACTIVE | Noted: 2025-02-13

## 2025-02-13 PROBLEM — M99.09 SEGMENTAL AND SOMATIC DYSFUNCTION: Status: ACTIVE | Noted: 2025-02-13

## 2025-02-13 PROBLEM — H69.90 DYSFUNCTION OF EUSTACHIAN TUBE: Status: ACTIVE | Noted: 2025-02-13

## 2025-02-13 PROBLEM — J34.89 NASAL DISCHARGE: Status: ACTIVE | Noted: 2025-02-13

## 2025-02-13 PROBLEM — M25.512 PAIN OF LEFT SHOULDER REGION: Status: ACTIVE | Noted: 2025-02-13

## 2025-02-13 PROBLEM — M99.08 SOMATIC DYSFUNCTION OF RIB: Status: ACTIVE | Noted: 2025-02-13

## 2025-02-13 PROBLEM — E16.1 POSTPRANDIAL HYPOGLYCEMIA: Status: ACTIVE | Noted: 2025-02-13

## 2025-02-13 PROBLEM — J32.9 CHRONIC SINUSITIS: Status: ACTIVE | Noted: 2025-02-13

## 2025-02-13 PROBLEM — R51.9 TEMPORAL HEADACHE: Status: ACTIVE | Noted: 2025-02-13

## 2025-02-13 PROBLEM — R30.0 DYSURIA: Status: ACTIVE | Noted: 2025-02-13

## 2025-02-13 PROBLEM — R53.83 FATIGUE: Status: ACTIVE | Noted: 2025-02-13

## 2025-02-13 PROCEDURE — 99213 OFFICE O/P EST LOW 20 MIN: CPT | Performed by: NURSE PRACTITIONER

## 2025-02-13 PROCEDURE — 3008F BODY MASS INDEX DOCD: CPT | Performed by: NURSE PRACTITIONER

## 2025-02-13 RX ORDER — ASCORBIC ACID 500 MG
500 TABLET ORAL DAILY
COMMUNITY

## 2025-02-13 RX ORDER — AMOXICILLIN AND CLAVULANATE POTASSIUM 875; 125 MG/1; MG/1
1 TABLET, FILM COATED ORAL
COMMUNITY
Start: 2025-02-05

## 2025-02-13 RX ORDER — HYDROCHLOROTHIAZIDE 25 MG/1
120 TABLET ORAL NIGHTLY
COMMUNITY
Start: 2025-01-07 | End: 2025-02-13 | Stop reason: WASHOUT

## 2025-02-13 RX ORDER — BISMUTH SUBSALICYLATE 262 MG
1 TABLET,CHEWABLE ORAL DAILY
COMMUNITY

## 2025-02-13 RX ORDER — LEVOTHYROXINE SODIUM 100 UG/1
100 TABLET ORAL DAILY
Qty: 90 TABLET | Refills: 0 | Status: SHIPPED | OUTPATIENT
Start: 2025-02-13 | End: 2025-05-14

## 2025-02-13 RX ORDER — ERGOCALCIFEROL 1.25 MG/1
1 CAPSULE ORAL
COMMUNITY
Start: 2025-02-05

## 2025-02-13 RX ORDER — FERROUS SULFATE 325(65) MG
325 TABLET ORAL AS NEEDED
COMMUNITY

## 2025-02-13 RX ORDER — TOPIRAMATE 25 MG/1
25 TABLET ORAL
COMMUNITY
Start: 2025-01-15

## 2025-02-13 RX ORDER — DOCUSATE SODIUM 100 MG/1
1 CAPSULE, LIQUID FILLED ORAL 2 TIMES DAILY PRN
COMMUNITY
Start: 2024-03-13 | End: 2025-02-13 | Stop reason: WASHOUT

## 2025-02-13 ASSESSMENT — COLUMBIA-SUICIDE SEVERITY RATING SCALE - C-SSRS
2. HAVE YOU ACTUALLY HAD ANY THOUGHTS OF KILLING YOURSELF?: NO
6. HAVE YOU EVER DONE ANYTHING, STARTED TO DO ANYTHING, OR PREPARED TO DO ANYTHING TO END YOUR LIFE?: NO
1. IN THE PAST MONTH, HAVE YOU WISHED YOU WERE DEAD OR WISHED YOU COULD GO TO SLEEP AND NOT WAKE UP?: NO

## 2025-02-13 ASSESSMENT — PATIENT HEALTH QUESTIONNAIRE - PHQ9
1. LITTLE INTEREST OR PLEASURE IN DOING THINGS: NOT AT ALL
2. FEELING DOWN, DEPRESSED OR HOPELESS: NOT AT ALL
SUM OF ALL RESPONSES TO PHQ9 QUESTIONS 1 AND 2: 0

## 2025-02-13 NOTE — PROGRESS NOTES
"Subjective   Patient ID: Rachelle Laurent is a 39 y.o. female who presents for Med Refill (Has new insurance and it does not cover her thyroid medication due to it being a capsule, they will cover tablets.).    HPI   NEURO [facility] - labs before going on   [facility] -   Sees her with illness.    Immune compromised from birth Dx at 27  Histamine levels go high.  Endocrinologist in April    GYN  [facility] - general GYN and referred for Endo specialist  Review of Systems    Objective   /82   Temp 36.8 °C (98.3 °F)   Ht 1.575 m (5' 2\")   Wt 89.4 kg (197 lb)   BMI 36.03 kg/m²     Physical Exam    Assessment/Plan   {Assess/PlanSmartLinks:13315}       "         Assessment/Plan   Assessment & Plan  Hypothyroidism, unspecified type    Orders:    levothyroxine (Synthroid, Levoxyl) 100 mcg tablet; Take 1 tablet (100 mcg) by mouth early in the morning.. Take on an empty stomach at the same time each day,60 minutes prior to breakfast    Anxiety         CVID (common variable immunodeficiency)

## 2025-02-17 ENCOUNTER — APPOINTMENT (OUTPATIENT)
Dept: AUDIOLOGY | Facility: CLINIC | Age: 40
End: 2025-02-17
Payer: COMMERCIAL

## 2025-02-24 NOTE — PROGRESS NOTES
ADULT BALANCE FUNCTION TEST (BFT)      Name:  Rachelle Laurent  :  1985  Age:  39 y.o.  Date of Evaluation:  ***    IMPRESSIONS     Overall normal vestibular examination; no evidence of active vestibular system involvement to account for patient reported symptoms. There were no indications of peripheral or central vestibular system pathway involvement. There were no indications of active Benign Paroxysmal Positional Vertigo (BPPV) present. Normal observation of gait and transfers. Bedside postural control findings demonstrate normal functional balance with varying sensory information measured on the mCTSIB. Patient's risk of falling based on today's evaluation is low.    RECOMMENDATIONS     Consider re-evaluation as medically indicated.  Maintain a healthy lifestyle to help body function overall.  Continue monitoring per ENT/PCP preference.    Time: ***    HISTORY     Patient was seen for Balance Function Testing (BFT) due to a history of dizziness/imbalance. Vestibular case history collected via patient-clinician interview, patient chart review, and patient questionnaires.    Patient reported history of dizziness described as ***.  Symptoms began ***.  Episodes occur *** and last several *** before subsiding.  Most recent episode occurred ***.  Associated symptoms include nausea, presyncope.  Symptoms are provoked by looking up, weightlifting/training.  Symptoms are alleviated by ***.  Overall the patient's symptoms have *** over time.  This patient has had a total of *** falls due to their symptoms.   Denied any symptoms provoked by sneezing or coughing, as well as any presence of autophony.   Denied any recent medication changes.   Relevant medical history includes recurrent concussions (8 since ), neuropathy/tingling in the feet, headaches/migraines, cervical neck stiffness/pain, photo/phono sensitivity.  It should be noted patient underwent autonomic testing in 2024, which did not  "show evidence for POTS.  Patient reported complying with pre-test instructions.     She reports headache, lightheadedness and heart racing when she stands up for long time, confusion and nausea, was able to return to work 4 months after that.     EVALUATION     See VNG, vHIT, & VEMP Raw Data in \"Media\"    TEST RESULTS     BEDSIDE ASSESSMENT TEST  The bedside assessment is an optional portion of the test battery to further assist in differential diagnosis and screen for eye abnormalities which may affect testing.    Test Results   Otoscopy {CERUMEN:57197}   Tympanometry {TYMP2:07667::\"Normal (Type A).\"}   Extra-Ocular Range of Motion {ROM2:51679::\"Normal.\"}   Cover/Uncover {COVER/UNCOVER2:14921::\"Normal.\"}   Cervical Neck Exam {NECKEXAM2:71272::\"Normal.\"}   Vertebral Artery Screen {VAS2:46573::\"Normal.\"}   Ocular Counter-Roll {OCR2:22641::\"Normal.\"}   VOR Cancellation {VORC2:64783::\"Normal.\"}   Modified Clinical Test for Sensory Interaction of Balance  {MCTSIB2:55648::\"Normal.\"}       VIDEONYSTAGMOGRAPHY (VNG) TEST  VNG provides objective indications of peripheral and central vestibulo-ocular pathway involvement. Ocular motor testing to visually guided targets is conducted using a dual channel video-recording technique for the recording of eye movement in the horizontal and vertical planes. Air caloric testing is performed at 48 degrees C and 24 degrees C.     Test Result   Spontaneous Nystagmus {SPONTANEOUS2:72785::\"Absent.\"}   Gaze Nystagmus {GAZENYSTAGMUS2:13079::\"Normal.\"}   Random Saccade {OCULOMOTORS2:05283::\"Normal.\"}   Smooth Pursuit/Tracking {OCULOMOTORS2:35848::\"Normal.\"}   Optokinetic Nystagmus {OCULOMOTORS2:49314::\"Normal.\"}   [place]-Hallpike {BPPV2:49989::\"Normal.\"}   Roll {BPPVHORIZON2:86034::\"Normal.\"}   Positional {POSITIONALNYSTAGMUS2:71252::\"Normal.\"}   ***thermal Caloric Unilateral weakness of ***%   to the *** which is {NORMAL/ABNORMAL:73280}.    Directional preponderance of ***%   to the *** which " "is {NORMAL/ABNORMAL:72782}.     NOTE: monothermal caloric testing is indicated when:  Normal vHIT tracings, indicating appropriate horizontal canal function  Normal oVEMP testing, indicating appropriate superior nerve function  No central indications throughout oculomotor testing  Non-significant spontaneous or positional nystagmus observed (less than 6 d/s)  Caloric Slow Phase Velocity equal to 8 degrees or greater in each ear  Less than 15% caloric difference between the ears      VIDEO HEAD IMPULSE TEST (vHIT)  The vHIT procedure provides objective assessment of the high frequency vestibulo-ocular reflex (VOR) for each semicircular canal. Rapid, random horizontal and vertical thrusts are applied to the patient's head to provoke the VOR. The vHIT procedure includes two separate paradigms: Head Impulse Paradigm (HIMP) and Suppression Head Impulse Paradigm (SHIMP). SHIMP is an optional paradigm that is not appropriate to perform for every patient. However, it is appropriate to perform SHIMP when there is verified evidence of possible vestibulopathy in the traditional HIMP test.     Head Impulse Paradigm (HIMP)   Right Ear   Canal Gain Overt Saccades Covert Saccades   Lateral *** Absent Absent   Anterior *** Absent Absent   Posterior *** Absent Absent        Head Impulse Paradigm (HIMP)   Left Ear   Canal Gain Overt Saccades Covert Saccades   Lateral *** Absent Absent   Anterior *** Absent Absent   Posterior *** Absent Absent     {VHITGAIN:96531::\"Total gain for all canals tested was within normal limits \"} (<0.80 is abnormal for lateral, <0.70 is abnormal for vertical).  {HIMPSACCADES:23204::\"There was no evidence of overt or covert saccades throughout testing.\"}      Suppression Head Impulse Paradigm (SHIMP)    Canal Gain Corrective Anti-Compensatory Saccades   Right Lateral *** Present   Left Lateral *** Present     {VHITGAIN:95896::\"Total gain for all canals tested was within normal limits \"} (<0.80 is abnormal " "for lateral).  {SHIMPSACCADES:35639::\"There was evidence of appropriate corrective anti-compensatory saccades throughout testing.\"}      CERVICAL VESTIBULAR EVOKED MYOGENIC POTENTIALS (cVEMP)  The cVEMP procedure is an evoked potential used to test the saccule and its afferent pathway. An asymmetry ratio is utilized to determine side of lesion. The cVEMP was recorded with the patient cervical extension to produce isolated contraction of the ipsilateral sternocleidomastoid (SCM) muscle. The cVEMP was recorded using a 500 Hz tone burst or 1000 Hz tone burst at a rate of 5.1.      Ear Presentation Level Amplitude P1 Latency N1 Latency  Amplitude Asymmetry Ratio   Right *** dB nHL *** µV *** ms *** ms ***%   Left *** dB nHL *** µV *** ms *** ms       Superior Canal Dehiscence Screening (75 dB nHL): Negative bilaterally    {cVEMP thresholds:80308::\"Replicable cVEMP responses were within normal threshold levels, bilaterally. \"} {VEMP amplitudes (Optional):90482::\"Responses were within normal amplitude levels, bilaterally. \"} {VEMP ratio:09974::\"The amplitude asymmetry ratio of ***% was not significant (>33% = abnormal)\"}        OCULAR VESTIBULAR EVOKED MYOGENIC POTENTIALS (oVEMP)  The oVEMP procedure is an evoked potential used to test the utricle and its afferent pathway. An asymmetry ratio is utilized to determine side of lesion. This is a contralateral recording. The oVEMP was recorded with the patient seated upright with eyes tilted upward to produce isolated contraction of the contralateral inferior oblique muscle. The oVEMP were recorded using a 500 Hz, 1000 Hz, 4000 Hz tone burst at a rate of 5.1.       Ear Presentation Level Amplitude N1 Latency  P1 Latency  Amplitude Asymmetry Ratio   Right *** dB nHL *** µV *** ms *** ms ***%   Left *** dB nHL *** µV *** ms *** ms       Meniere's Disease Screening (1000 Hz): Negative bilaterally  Superior Canal Dehiscence Screening (4000 Hz): Negative bilaterally    {oVEMP " "thresholds:54286::\"Replicable oVEMP responses were within normal thresholds levels, bilaterally. \"} {VEMP amplitudes (Optional):73349::\"Responses were within normal amplitude levels, bilaterally. \"} {VEMP ratio:41915::\"The amplitude asymmetry ratio of ***% was not significant (>33% = abnormal)\"}      Raw data reviewed by a member of the Vestibular & Balance Disorders team. Testing and interpretation of results completed by Kris Wright CCC-[facility]. It was my pleasure to evaluate this patient.     Additional Attendees: ***      Kris Wright, CCC-A [facility]  Senior Clinical Vestibular Audiologist   "

## 2025-03-03 ENCOUNTER — APPOINTMENT (OUTPATIENT)
Dept: AUDIOLOGY | Facility: CLINIC | Age: 40
End: 2025-03-03
Payer: COMMERCIAL

## 2025-03-04 ENCOUNTER — EVALUATION (OUTPATIENT)
Dept: PHYSICAL THERAPY | Facility: CLINIC | Age: 40
End: 2025-03-04
Payer: COMMERCIAL

## 2025-03-04 VITALS — HEART RATE: 98 BPM | SYSTOLIC BLOOD PRESSURE: 110 MMHG | DIASTOLIC BLOOD PRESSURE: 65 MMHG

## 2025-03-04 DIAGNOSIS — S06.0X0A CONCUSSION WITHOUT LOSS OF CONSCIOUSNESS, INITIAL ENCOUNTER: ICD-10-CM

## 2025-03-04 DIAGNOSIS — F07.81 POSTCONCUSSION SYNDROME: ICD-10-CM

## 2025-03-04 DIAGNOSIS — M99.01 SOMATIC DYSFUNCTION OF CERVICAL REGION: Primary | ICD-10-CM

## 2025-03-04 DIAGNOSIS — R68.89 DECREASED ACTIVITY TOLERANCE: ICD-10-CM

## 2025-03-04 DIAGNOSIS — M54.2 NECK PAIN: ICD-10-CM

## 2025-03-04 DIAGNOSIS — R42 DIZZINESS: ICD-10-CM

## 2025-03-04 PROCEDURE — 97535 SELF CARE MNGMENT TRAINING: CPT | Mod: GP | Performed by: PHYSICAL THERAPIST

## 2025-03-04 PROCEDURE — 97162 PT EVAL MOD COMPLEX 30 MIN: CPT | Mod: GP | Performed by: PHYSICAL THERAPIST

## 2025-03-04 ASSESSMENT — PAIN SCALES - GENERAL: PAINLEVEL_OUTOF10: 3

## 2025-03-04 ASSESSMENT — PAIN - FUNCTIONAL ASSESSMENT: PAIN_FUNCTIONAL_ASSESSMENT: 0-10
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
## 2025-03-09 PROBLEM — R68.89 DECREASED ACTIVITY TOLERANCE: Status: ACTIVE | Noted: 2025-03-09

## 2025-03-09 ASSESSMENT — ENCOUNTER SYMPTOMS
LOSS OF SENSATION IN FEET: 0
DEPRESSION: 0
OCCASIONAL FEELINGS OF UNSTEADINESS: 0

## 2025-03-12 ENCOUNTER — OFFICE VISIT (OUTPATIENT)
Dept: SPORTS MEDICINE | Facility: HOSPITAL | Age: 40
End: 2025-03-12
Payer: COMMERCIAL

## 2025-03-12 VITALS — HEART RATE: 100 BPM | OXYGEN SATURATION: 98 % | SYSTOLIC BLOOD PRESSURE: 154 MMHG | DIASTOLIC BLOOD PRESSURE: 84 MMHG

## 2025-03-12 DIAGNOSIS — S16.1XXA CERVICAL STRAIN, ACUTE, INITIAL ENCOUNTER: Primary | ICD-10-CM

## 2025-03-12 DIAGNOSIS — M99.00 SOMATIC DYSFUNCTION OF HEAD REGION: ICD-10-CM

## 2025-03-12 DIAGNOSIS — S06.0X0A CONCUSSION WITHOUT LOSS OF CONSCIOUSNESS, INITIAL ENCOUNTER: ICD-10-CM

## 2025-03-12 DIAGNOSIS — M99.07 SOMATIC DYSFUNCTION OF UPPER EXTREMITY: ICD-10-CM

## 2025-03-12 DIAGNOSIS — M99.09 SOMATIC DYSFUNCTION OF ABDOMINAL REGION: ICD-10-CM

## 2025-03-12 DIAGNOSIS — M99.01 SOMATIC DYSFUNCTION OF CERVICAL REGION: ICD-10-CM

## 2025-03-12 DIAGNOSIS — M99.02 SOMATIC DYSFUNCTION OF THORACIC REGION: ICD-10-CM

## 2025-03-12 DIAGNOSIS — M99.08 SOMATIC DYSFUNCTION OF RIB CAGE REGION: ICD-10-CM

## 2025-03-12 DIAGNOSIS — M99.03 SOMATIC DYSFUNCTION OF LUMBAR REGION: ICD-10-CM

## 2025-03-12 PROCEDURE — 1036F TOBACCO NON-USER: CPT | Performed by: PEDIATRICS

## 2025-03-12 PROCEDURE — 99214 OFFICE O/P EST MOD 30 MIN: CPT | Performed by: PEDIATRICS

## 2025-03-12 PROCEDURE — 99214 OFFICE O/P EST MOD 30 MIN: CPT | Mod: 25 | Performed by: PEDIATRICS

## 2025-03-12 PROCEDURE — 98928 OSTEOPATH MANJ 7-8 REGIONS: CPT | Performed by: PEDIATRICS

## 2025-03-12 ASSESSMENT — PAIN SCALES - GENERAL: PAINLEVEL_OUTOF10: 1

## 2025-03-12 ASSESSMENT — PAIN - FUNCTIONAL ASSESSMENT: PAIN_FUNCTIONAL_ASSESSMENT: 0-10
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
## 2025-03-18 NOTE — PROGRESS NOTES
ADULT AUDIOMETRIC EVALUATION      Name:  Rachelle Laurent  :  1985  Age:  39 y.o.  Date of Evaluation:  3/24/2025    IMPRESSIONS     Today's test results are consistent with normal hearing sensitivity, bilaterally. Discussed results and recommendations with patient.  Questions were addressed and the patient was encouraged to contact our department should concerns arise.    RECOMMENDATIONS     Continue medical follow up with PCP/ENT.  Return for evaluation following any medical management.     Time: 4321-9408    HISTORY     Rachelle Laurent is seen today in conjunction with Balance Function Test (BFT) appointment. Please refer to same day provider note for full case history.    TEST RESULTS     Screening Measures: Risk screenings are completed annually or more frequently as designated upon arrival to an outpatient location  Steadi Fall Risk: One or more falls in the last year? No  Domestic Violence: Do you feel UNSAFE going back to the place you are living? No/Not Indicated  Pain: Are you in any pain (0-10)? 0    Otoscopic Evaluation:  Physical exam to evaluate the outer ear  Right Ear: Clear ear canals.  Left Ear: Clear ear canals.    Tympanometry & Acoustic Reflexes:  Assesses the function of the middle ear and inner ear structures  Right Ear: Tympanometry revealed normal ear canal volume, peak pressure, and compliance (Type A). Could not test Ipsilateral Acoustic Reflexes after several attempts due to inability to maintain hermetic seal.   Left Ear: Tympanometry revealed normal ear canal volume, peak pressure, and compliance (Type A). Could not test Ipsilateral Acoustic Reflexes after several attempts due to inability to maintain hermetic seal.     Distortion Product Otoacoustic Emissions: Assesses the cochlear outer hair cell function  Right Ear: DNT  Left Ear:  DNT    Pure Tone Audiometry: Conventional Audiometry via TDH Headphones with good reliability  Right Ear: Hearing sensitivity WNL.    Left Ear: Hearing sensitivity WNL.     Speech Audiometry:   Right Ear: Speech Reception Threshold (SRT) was obtained at 5 dBHL. Word Recognition scores were excellent (96%) in quiet when words were presented at 55 dBHL.   Left Ear: Speech Reception Threshold (SRT) was obtained at 10 dBHL. Word Recognition scores were excellent (100%) in quiet when words were presented at 55 dBHL.       Testing and interpretation of results completed by Kris Wright, CCC-A [facility]. It was my pleasure to evaluate this patient.       Kris Wright, CCC-A [facility]  Senior Clinical Vestibular Audiologist    Degree of Hearing Sensitivity Decibel Range   Within Normal Limits (WNL) 0-25   Mild 26-40   Moderate 41-55   Moderately-Severe 56-70   Severe 71-90   Profound 91+      Key   CNT/DNT Could Not Test/Did Not Test   TM Tympanic Membrane   WNL Within Normal Limits   HA Hearing Aid   SNHL Sensorineural Hearing Loss   CHL Conductive Hearing Loss   NIHL Noise-Induced Hearing Loss   ECV Ear Canal Volume   RE/LE Right Ear/Left Ear        AUDIOGRAM

## 2025-03-18 NOTE — PROGRESS NOTES
ADULT BALANCE FUNCTION TEST (BFT)  Name:  Rachelle Laurent  :  1985  Age:  39 y.o.  Date of Evaluation:  3/24/2025    IMPRESSIONS     Peripheral vestibular involvement given the followin) asymmetric caloric irrigations weaker to the left, 2) present spontaneous nystagmus, and 3) gaze evoked nystagmus consistent with Sherwin's Law supporting left peripheral vestibular involvement. Note that current clinical balance tests cannot distinguish between lesions of the labyrinth, VIIIth nerve, or root entry zone of the brainstem vestibular nuclei, thus the phrase peripheral refers to all of the structures. The vestibular system appears to be uncompensated physiologically and uncompensated functionally.     There were no indications of central vestibular system pathway involvement. There were no indications of active Benign Paroxysmal Positional Vertigo (BPPV) present.  Normal observation of gait and transfers.     Combined findings and case history demonstrate suspected Vestibular Migraine (VM) given the followin) vertigo/spinning with positional changes or visual changes, 2) persistent mild imbalance and motion sensitivity between vertigo episodes, 3) headaches/migraine occurring with symptoms, 4) evidence unilateral vestibulopathy at today's evaluation.    RECOMMENDATIONS     Consider vestibular physical therapy to address uncompensated unilateral vestibulopathy. Emphasis on gaze stabilization exercises for VOR gain, habituation exercises to triggers, and fall risk prevention.  Consider further investigation and management of suspected Vestibular Migraine component to dizziness.  Consider re-evaluation as medically indicated.  Maintain a healthy lifestyle to help body function overall.  Continue monitoring per ENT/PCP preference.    Time: 8475-1247    HISTORY     Patient was seen for Balance Function Testing (BFT) due to a history of dizziness/imbalance. Vestibular case history collected via  patient-clinician interview, patient chart review, and patient questionnaires.    Patient reported history of dizziness described as previous vertigo/spinning which is now subsided into mainly lightheadedness (mild amounts of vertigo).  Symptoms began January 2022 following a concussion however notes similar symptoms years prior.  Episodes occur daily and last several seconds to all day intermittently before subsiding.  Associated symptoms include nausea, presyncope, imbalance, migraine (especially following quick head movements).  Symptoms are provoked by looking up/down, bending down, standing quickly, visual changes/complex stimuli, and weightlifting/training.  Symptoms are alleviated by resting, taking Magnesium/Topamax, and by performing PT exercises.  Overall the patient's symptoms have increased in frequency and intensity over time.  Patient has previously participated in vestibular physical therapy with focus on gaze stabilization and neck traction. Patient did perceive improvement in symptoms following treatment. Recently began therapy again with significant migraine for days following initial evaluation (quick head movements).  This patient has had a total of 0 falls due to their symptoms.   Auditory symptoms include some autophony, sound sensitivity, aural fullness (with sinus infections), and tinnitus.  Recently began IVIG subque infusions and Topamax for migraines.  Relevant medical history includes recurrent concussions (8 since 2022), neuropathy/tingling in the feet, headaches/migraines, cervical neck stiffness/pain, photo/phono sensitivity.  It should be noted patient underwent autonomic testing in October 2024, which did not show evidence for POTS.  Relevant family medical history includes reported maternal vertigo/spinning episodes, and extensive migraine history on maternal side.  Patient reported complying with pre-test instructions.     EVALUATION     See VNG, vHIT, & VEMP Raw Data in  "\"Media\"    TEST RESULTS     BEDSIDE ASSESSMENT TEST  The bedside assessment is an optional portion of the test battery to further assist in differential diagnosis and screen for eye abnormalities which may affect testing.    Test Results   Otoscopy Clear ear canals.   Tympanometry Normal (Type A).   Extra-Ocular Range of Motion Normal.   Cover/Uncover Normal.   Cervical Neck Exam Abnormal given pain with quick head turns or far neck turns. Testing will be modified accordingly.   Vertebral Artery Screen Normal.   Ocular Counter-Roll Normal.   VOR Cancellation Normal.       VIDEONYSTAGMOGRAPHY (VNG) TEST  VNG provides objective indications of peripheral and central vestibulo-ocular pathway involvement. Ocular motor testing to visually guided targets is conducted using a dual channel video-recording technique for the recording of eye movement in the horizontal and vertical planes. Air caloric testing is performed at 48 degrees C and 24 degrees C.     Test Result   Spontaneous Nystagmus Present. Persistent low velocity (2 d/s) right-beating nystagmus. Reduced with fixation light.   Gaze Nystagmus Normal. Of note, persistent low velocity (1 d/s) right-beating nystagmus present in the gaze right without fixation condition(s). Patient did not report symptoms of dizziness. Not clinically significant.   Random Saccade Normal.   Smooth Pursuit/Tracking Normal.   Optokinetic Nystagmus Normal.   Valeriano-Hallpike Normal.   Roll Deferred on this date due to cervical neck exam.   Positional Normal.   Bithermal Caloric Unilateral weakness of 26%   to the left which is abnormal.    Directional preponderance of 3%   to the left which is normal.   Of note, caloric calculations corrected for pre-caloric nystagmus (2 d/s right-beating).      VIDEO HEAD IMPULSE TEST (vHIT)  The vHIT procedure provides objective assessment of the high frequency vestibulo-ocular reflex (VOR) for each semicircular canal. Rapid, random horizontal and vertical " thrusts are applied to the patient's head to provoke the VOR. The vHIT procedure includes two separate paradigms: Head Impulse Paradigm (HIMP) and Suppression Head Impulse Paradigm (SHIMP). SHIMP is an optional paradigm that is not appropriate to perform for every patient. However, it is appropriate to perform SHIMP when there is verified evidence of possible vestibulopathy in the traditional HIMP test.       Head Impulse Paradigm (HIMP)   Lateral Canal   Canal Gain Overt Saccades Covert Saccades   Right 1.00 absent absent   Left 0.89 absent absent      Vertical canal testing deferred due to patient reported concerns for possible migraine following quick head movements. Additionally, results should be interpret with caution as only 2 impulses were completed to each side prior to patient request to defer.    Total gain for all canals tested was within normal limits  (<0.80 is abnormal for lateral, <0.70 is abnormal for vertical).  There was no evidence of overt or covert saccades throughout testing.       CERVICAL VESTIBULAR EVOKED MYOGENIC POTENTIALS (cVEMP)  The cVEMP procedure is an evoked potential used to test the saccule and its afferent pathway. An asymmetry ratio is utilized to determine side of lesion. The cVEMP was recorded with the patient cervical extension to produce isolated contraction of the ipsilateral sternocleidomastoid (SCM) muscle. The cVEMP was recorded using a 500 Hz tone burst or 1000 Hz tone burst at a rate of 5.1.      Ear Presentation Level Amplitude P1 Latency N1 Latency  Amplitude Asymmetry Ratio   Right 95 dB nHL 52.52 µV 14.67 ms 23.00 ms 6%   Left 95 dB nHL 58.71 µV 14.67 ms 22.67 ms       Superior Canal Dehiscence Screening (75 dB nHL): Negative bilaterally    Replicable cVEMP responses were within normal threshold levels, bilaterally.  Responses were within normal amplitude levels, bilaterally.  The amplitude asymmetry ratio of 6% was not significant (>33% = abnormal).        OCULAR  VESTIBULAR EVOKED MYOGENIC POTENTIALS (oVEMP)  The oVEMP procedure is an evoked potential used to test the utricle and its afferent pathway. An asymmetry ratio is utilized to determine side of lesion. This is a contralateral recording. The oVEMP was recorded with the patient seated upright with eyes tilted upward to produce isolated contraction of the contralateral inferior oblique muscle. The oVEMP were recorded using a 500 Hz, 1000 Hz, 4000 Hz tone burst at a rate of 5.1.       Ear Presentation Level Amplitude N1 Latency  P1 Latency  Amplitude Asymmetry Ratio   Right 100 dB nHL 7.21 µV 12.00 ms 16.33 ms 8%   Left 100 dB nHL 8.48 µV 12.33 ms 17.67 ms       Meniere's Disease Screening (1000 Hz): Negative bilaterally  Superior Canal Dehiscence Screening (4000 Hz): Negative bilaterally    Replicable oVEMP responses were within normal thresholds levels, bilaterally.  Responses were within normal amplitude levels, bilaterally.  The amplitude asymmetry ratio of 8% was not significant (>33% = abnormal).      Raw data reviewed by a member of the Vestibular & Balance Disorders team. Testing and interpretation of results completed by Kris Wright CCC-A [facility]. It was my pleasure to evaluate this patient.       Kris Wright, CCC-A [facility]  Senior Clinical Vestibular Audiologist

## 2025-03-24 ENCOUNTER — CLINICAL SUPPORT (OUTPATIENT)
Dept: AUDIOLOGY | Facility: CLINIC | Age: 40
End: 2025-03-24
Payer: COMMERCIAL

## 2025-03-24 DIAGNOSIS — H93.293 ABNORMAL AUDITORY PERCEPTION OF BOTH EARS: Primary | ICD-10-CM

## 2025-03-24 DIAGNOSIS — R42 DIZZINESS AND GIDDINESS: Primary | ICD-10-CM

## 2025-03-24 PROCEDURE — 92557 COMPREHENSIVE HEARING TEST: CPT

## 2025-03-24 PROCEDURE — 92567 TYMPANOMETRY: CPT

## 2025-03-24 PROCEDURE — 92519 VEMP TST I&R CERVICAL&OCULAR: CPT

## 2025-03-24 PROCEDURE — 92700 UNLISTED ORL SERVICE/PX: CPT

## 2025-03-24 PROCEDURE — 92537 CALORIC VSTBLR TEST W/REC: CPT

## 2025-03-24 PROCEDURE — 92540 BASIC VESTIBULAR EVALUATION: CPT

## 2025-03-24 ASSESSMENT — PAIN SCALES - GENERAL: PAINLEVEL_OUTOF10: 0 - NO PAIN

## 2025-03-24 ASSESSMENT — PAIN - FUNCTIONAL ASSESSMENT: PAIN_FUNCTIONAL_ASSESSMENT: 0-10

## 2025-03-24 NOTE — PATIENT INSTRUCTIONS
BALANCE FUNCTION TEST (BFT)  AFTER VISIT SUMMARY      TESTING SUMMARY     The purpose of today's testing was to evaluate for any vestibular system (inner ear) involvement to account for your symptoms of dizziness/imbalance. Deep inside each of your ears, there are 5 balance organs which contribute to your ability to maintain balance and reduce dizziness. Our vestibular system involves 3 semicircular canals (“spinning detectors”) and 2 otolith organs (“gravity sensors”).    IMPRESSIONS     Based on today's evaluation, your vestibular system appears to be weakened on the left and possibly contributing as a source for your symptoms.    RECOMMENDATIONS     Continue medical follow up with PCP/Neurology.   Consider further vestibular physical therapy to address vestibular loss.   Consider re-evaluation as medically indicated.  Maintain a healthy lifestyle to help body function overall.    Testing and interpretation of results completed by Kris Wright CCC-A [facility]. It was my pleasure to evaluate this patient.       Kris Wright, CCC-A [facility]  Senior Clinical Vestibular Audiologist

## 2025-03-25 ASSESSMENT — ENCOUNTER SYMPTOMS
UNEXPECTED WEIGHT CHANGE: 1
PALPITATIONS: 0
POLYDIPSIA: 0
POLYPHAGIA: 0

## 2025-03-26 ENCOUNTER — OFFICE VISIT (OUTPATIENT)
Dept: SPORTS MEDICINE | Facility: HOSPITAL | Age: 40
End: 2025-03-26
Payer: COMMERCIAL

## 2025-03-26 VITALS — TEMPERATURE: 99.1 F | OXYGEN SATURATION: 98 % | HEART RATE: 101 BPM

## 2025-03-26 DIAGNOSIS — M99.01 SOMATIC DYSFUNCTION OF CERVICAL REGION: ICD-10-CM

## 2025-03-26 DIAGNOSIS — M99.03 SOMATIC DYSFUNCTION OF LUMBAR REGION: ICD-10-CM

## 2025-03-26 DIAGNOSIS — S06.0X0A CONCUSSION WITHOUT LOSS OF CONSCIOUSNESS, INITIAL ENCOUNTER: ICD-10-CM

## 2025-03-26 DIAGNOSIS — M99.00 SOMATIC DYSFUNCTION OF HEAD REGION: ICD-10-CM

## 2025-03-26 DIAGNOSIS — M99.08 SOMATIC DYSFUNCTION OF RIB CAGE REGION: ICD-10-CM

## 2025-03-26 DIAGNOSIS — S16.1XXA CERVICAL STRAIN, ACUTE, INITIAL ENCOUNTER: Primary | ICD-10-CM

## 2025-03-26 DIAGNOSIS — M99.07 SOMATIC DYSFUNCTION OF UPPER EXTREMITY: ICD-10-CM

## 2025-03-26 DIAGNOSIS — M99.09 SOMATIC DYSFUNCTION OF ABDOMINAL REGION: ICD-10-CM

## 2025-03-26 DIAGNOSIS — M99.02 SOMATIC DYSFUNCTION OF THORACIC REGION: ICD-10-CM

## 2025-03-26 PROCEDURE — 99214 OFFICE O/P EST MOD 30 MIN: CPT | Performed by: PEDIATRICS

## 2025-03-26 PROCEDURE — 1036F TOBACCO NON-USER: CPT | Performed by: PEDIATRICS

## 2025-03-26 PROCEDURE — 98928 OSTEOPATH MANJ 7-8 REGIONS: CPT | Performed by: PEDIATRICS

## 2025-03-26 PROCEDURE — 99214 OFFICE O/P EST MOD 30 MIN: CPT | Mod: 25 | Performed by: PEDIATRICS

## 2025-03-26 ASSESSMENT — PAIN SCALES - GENERAL: PAINLEVEL_OUTOF10: 2

## 2025-03-26 ASSESSMENT — PAIN - FUNCTIONAL ASSESSMENT: PAIN_FUNCTIONAL_ASSESSMENT: 0-10
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
## 2025-03-26 NOTE — ASSESSMENT & PLAN NOTE
Orders:    levothyroxine (Synthroid, Levoxyl) 100 mcg tablet; Take 1 tablet (100 mcg) by mouth early in the morning.. Take on an empty stomach at the same time each day,60 minutes prior to breakfast

## 2025-03-28 ENCOUNTER — TREATMENT (OUTPATIENT)
Dept: PHYSICAL THERAPY | Facility: CLINIC | Age: 40
End: 2025-03-28
Payer: COMMERCIAL

## 2025-03-28 DIAGNOSIS — S06.0X0A CONCUSSION WITHOUT LOSS OF CONSCIOUSNESS, INITIAL ENCOUNTER: ICD-10-CM

## 2025-03-28 DIAGNOSIS — R42 DIZZINESS: Primary | ICD-10-CM

## 2025-03-28 DIAGNOSIS — M54.2 NECK PAIN: ICD-10-CM

## 2025-03-28 DIAGNOSIS — M43.6 NECK STIFFNESS: ICD-10-CM

## 2025-03-28 PROCEDURE — 97110 THERAPEUTIC EXERCISES: CPT | Mod: GP | Performed by: PHYSICAL THERAPIST

## 2025-03-28 PROCEDURE — 97140 MANUAL THERAPY 1/> REGIONS: CPT | Mod: GP | Performed by: PHYSICAL THERAPIST

## 2025-03-28 PROCEDURE — 97112 NEUROMUSCULAR REEDUCATION: CPT | Mod: GP | Performed by: PHYSICAL THERAPIST

## 2025-03-28 ASSESSMENT — PAIN - FUNCTIONAL ASSESSMENT: PAIN_FUNCTIONAL_ASSESSMENT: 0-10

## 2025-03-28 ASSESSMENT — PAIN SCALES - GENERAL: PAINLEVEL_OUTOF10: 2

## 2025-03-28 NOTE — PROGRESS NOTES
Physical Therapy    Physical Therapy Treatment    Patient Name: Rachelle Laurent  MRN: [id number]  Today's Date: 3/28/2025  Leti  Visit number: 2  Primary dx=dizziness  Time Entry:   Time Calculation  Start Time: 1030  Stop Time: 1145  Time Calculation (min): 75 min     PT Therapeutic Procedures Time Entry  Manual Therapy Time Entry: 15  Neuromuscular Re-Education Time Entry: 30  Therapeutic Exercise Time Entry: 30       Assessment:  Pt. Completed the MCTSIB and FGA today, pt. Scored a 27/30 on the FGA with difficulty noted with horizontal vestibular challenges, eyes closed conditions and backwards walking. The patient has a diagnosed vestibular hypofunction which provided explanation for the above balance challenges.  The patient had the initial onset of a migraine today. Discussed utilizing acute pharmaceutical management that has been prescribed by her physician as needed.  Also discussed various non-pharmacological management techniques for acute migraine.  New goals were established today for FGA and PFSF.  The patient is demonstrating hypersensitivity to touch in cervical musculature and is presenting with s/s consistent with central sensitization in the cervical region.       Plan: manual therapy, Meghan test, 1st rib mob       Current Problem  1. Dizziness        2. Concussion without loss of consciousness, initial encounter  Follow Up In Physical Therapy      3. Neck stiffness        4. Neck pain                  Subjective    Pt. Reports that she had a migraine every day for a week after the testing in physical therapy and it took her 2 weeks to fully recover.  She thinks it is because of what happened this week.  She did the vestibular testing Monday and Wednesday she went to Dr. Nolan said that her muscles were in the worst condition that she has seen in her. She still has some neck pain today but it is better.  Her main triggers to dizziness are turning quickly.  Another one is looking down is  also a trigger. She also struggles looking down with her eyes and that really sets her off.  Sometimes she gets dizzy if she gets up too fast.  She gest dizzy if she is crouched and she thinks that is due to her old blood clot in her right leg behind her knee.  She has been struggling with exercise.  She feels like her migraines are worse.  She is getting migraines the next day after exercises.  She went for a hike Wednesday and she was a little nervous that she was going to get a migraine but she didn't.  She gets a migraine usually after an infusion.  Her doctor has been giving her an IV of fluids to decrease the worsening of her migraines the next day.  She has a tiny migraine today.  She is thinking about talking to her doctor about taking a migraine prevention medication. She can feel a slight migraine happening right now.   Her migraines gets worse around the time of ovulation.  She feels like her dizziness Is related to her infusion.  She has tried dry needling in the past.       Precautions: chronic venous insufficiency right leg, chronic DVT R leg, hypothyroidism, migraine                                                                  Vital Signs: not taken     Pain  Pain Assessment  Pain Assessment: 0-10  0-10 (Numeric) Pain Score: 2  Pain Type: Chronic pain  Pain Location: Neck    Objective      Posture: moderately forward head/rounded shoulders    Palpation: hypersensitivity in R UT, bilateral suboccipitals    Concussion Symptom Rating Scale:  15    PSFS:  Cleaning the house: 3/10  Hikin/10  Weights liftin/10    Modified Clinical Sensory Integration Test  Condition One: Eyes Open, Firm Surface  Total Time: __30_____ / 30 sec minimal sway  Condition Two: Eyes Closed, Firm Surface  Total Time: __30_____ / 30 sec mild sway  Condition Three: Eyes Open, Foam Surface  Total Time: __30_____ / 30 sec mild sway  Condition Four: Eyes Closed, Foam Surface  Total Time: __30_____ / 30 sec moderate sway       "  Outcome Measures:  FGA - Functional Gait Assessment  Gait level surface: 3  Change in gait speed: 3  Gait with horizontal head turns: 2  Gait with vertical head turns: 3  Gait and pivot turn: 3  Step over obstacle: 3  Gait with narrow base of support: 3  Gait with eyes closed: 2  Ambulating backwards: 2 (17.66 seconds)  Steps: 3  FGA Total Score: 27    Treatments:    There Ex:  -Thoracic extensions on foam roller x5 mid thoracic, x5 upper thoracic spine  -\"vinny wing\" \"chicken wing\" x5 reps with foam roller along spine  -review of chin retractions x5 reps with TC for neutral spine  -review of self suboccipital release  -review of levator stretch 10\" x1 bilaterally, verbal cues for neutral spine    Neuro Re-Ed:  The following exercises were completed at supervision level over 20ft. distance  Gait level surface:  Change in gait speed:  Gait with horizontal head turns:  Gait with vertical head turns:  Gait with pivot turn:  Step over obstacle:  Gait with narrow TESHA:  Gait with eyes closed:  Ambulating backwards:  Stairs 3-6 inch stairs   Educated pt. On fall risk cut off score.   -Modified Clinical Sensory Integration Test  Condition One: Eyes Open, Firm Surface  Condition Two: Eyes Closed, Firm Surface  Condition Three: Eyes Open, Foam Surface  Condition Four: Eyes Closed, Foam Surface  -Migraine specific education including up to date research including american migraine asscociation, [place] migraine association, [employer] HOMETRAX on [place]  -educated pt. On utilizing various acute migraine non-pharmacological treatments including heat, mint essential oil, CBD, neuromodulation techniques    Manual therapy:  -Myofascial release bilateral UT R>L  -Suboccipital release bilaterally  -1st rib mobilization on R side (grade II with pt. Breathing to provide self mobilization)    OP EDUCATION:  -see treatment         Goals:  Active       PT Problem       PT Goal 1       Start:  03/09/25    Expected End:  06/02/25       " "Pt. Will demonstrate reduced neck pain with flexion ranges in order to perform activities such as reading.         PT Goal 2       Start:  03/09/25    Expected End:  06/02/25       Pt. Will return to tolerating 4 hours of screen time with necessary modifications in order to build up tolerance of screen time to return to work.         PT Goal 3       Start:  03/09/25    Expected End:  06/02/25       Pt. Will complete YFRF in next 1-2 visits         PT Goal 4       Start:  03/09/25    Expected End:  06/02/25       Pt. Will complete NDI in next 1-2 visits         PT Goal 5       Start:  03/09/25    Expected End:  06/02/25       Pt. Will be independent in HEP in next 1-2 visits to promote self efficacy, manage symptoms and meet other LTG.          Patient Stated Goal 1       Start:  03/09/25    Expected End:  06/02/25       \"almost 0 on concussion -4 symptoms-scored at 4 then December concussion happened better manual therapy of neck and makes a huge difference/exercise tolerance.         PT Goal 1       Start:  03/09/25    Expected End:  06/02/25       Pt. Will tolerate exercise above HR of 140 BPM without headache to indicate improved cardiovascular endurance and activity tolerance.           "

## 2025-03-31 ENCOUNTER — TELEPHONE (OUTPATIENT)
Dept: PRIMARY CARE | Facility: CLINIC | Age: 40
End: 2025-03-31
Payer: COMMERCIAL

## 2025-03-31 NOTE — TELEPHONE ENCOUNTER
Pt calling states since the change of thyroid meds she has been having migraines 3-4 days a week. She wants to know if she can go back to what she was on. Or does she need an apt to discuss this with you?

## 2025-04-01 DIAGNOSIS — E03.9 HYPOTHYROIDISM, UNSPECIFIED TYPE: Primary | ICD-10-CM

## 2025-04-01 RX ORDER — LEVOTHYROXINE SODIUM 100 UG/1
100 CAPSULE ORAL DAILY
Qty: 90 CAPSULE | Refills: 3 | Status: SHIPPED | OUTPATIENT
Start: 2025-04-01

## 2025-04-02 ENCOUNTER — APPOINTMENT (OUTPATIENT)
Dept: PHYSICAL THERAPY | Facility: CLINIC | Age: 40
End: 2025-04-02
Payer: COMMERCIAL

## 2025-04-08 ENCOUNTER — TREATMENT (OUTPATIENT)
Dept: PHYSICAL THERAPY | Facility: CLINIC | Age: 40
End: 2025-04-08
Payer: COMMERCIAL

## 2025-04-08 DIAGNOSIS — S06.0X0A CONCUSSION WITHOUT LOSS OF CONSCIOUSNESS, INITIAL ENCOUNTER: ICD-10-CM

## 2025-04-08 DIAGNOSIS — M43.6 NECK STIFFNESS: ICD-10-CM

## 2025-04-08 DIAGNOSIS — R42 DIZZINESS: Primary | ICD-10-CM

## 2025-04-08 DIAGNOSIS — M54.2 NECK PAIN: ICD-10-CM

## 2025-04-08 PROCEDURE — 97140 MANUAL THERAPY 1/> REGIONS: CPT | Mod: GP | Performed by: PHYSICAL THERAPIST

## 2025-04-08 PROCEDURE — 97110 THERAPEUTIC EXERCISES: CPT | Mod: GP | Performed by: PHYSICAL THERAPIST

## 2025-04-08 PROCEDURE — 97112 NEUROMUSCULAR REEDUCATION: CPT | Mod: GP | Performed by: PHYSICAL THERAPIST

## 2025-04-08 ASSESSMENT — PAIN SCALES - GENERAL: PAINLEVEL_OUTOF10: 2

## 2025-04-08 ASSESSMENT — PAIN - FUNCTIONAL ASSESSMENT: PAIN_FUNCTIONAL_ASSESSMENT: 0-10

## 2025-04-08 NOTE — PROGRESS NOTES
Physical Therapy    Physical Therapy Treatment    Patient Name: Rachelle Laurent  MRN: [id number]  Today's Date: 4/8/2025  Leti  Visit number: 3  Primary dx=dizziness  Time Entry:   Time Calculation  Start Time: 1020  Stop Time: 1100  Time Calculation (min): 40 min     PT Therapeutic Procedures Time Entry  Manual Therapy Time Entry: 12  Neuromuscular Re-Education Time Entry: 20  Therapeutic Exercise Time Entry: 8       Assessment:  Ms. Laurent continues to present with increased frequency of migraine. The patient reports that she has been experiencing vertigo with her migraines and meets the diagnostic criteria for vestibular migraine. We discussed referring to her migraine specialist for further evaluation/treatment. The patient is presenting with normal oculomotor findings with normal convergence. Pt. Reported some eye strain with smooth pursuits with diagonals which were given to pt. For HEP. The patient is presenting with increased reliance on visual information for balance, which is leading to visual sensitivity. Provided pt. With optokinetic videos for habituation.      Plan: manual therapy, cardiovascular exercise, scapular stabilization strengthening       Current Problem  1. Dizziness        2. Concussion without loss of consciousness, initial encounter  Follow Up In Physical Therapy      3. Neck stiffness        4. Neck pain                  Subjective     Pt. Reports that she has been taking care of her mom in the past week.  She is still getting headaches quite frequently. In the past 4 days she feels spacey and disoriented and feels that her convergence is terrible. If she looks down she gets dizzy.  She is trying to get into an Endocrinologist because she thinks her thyroid is too high.  She switched back to her old thyroid pills a few days ago. She ended up with heart palpitations and a rash and couldn't work out that day.  She is back on a different one that she thinks gives her migraines more.   She is having heart palpitations.  Her heart rate always goes up pretty high after coffee.  She has a 1/0 headache today. Her neck pain is higher today, 2/10.  She had an appointment with her headache doctor next week.  She went for a fast walk yesterday, she went too far and did 25 minutes keeping her HR at 135 and turned around and within 5 minutes she got a headache and had to rest a lot when she got back to the car.  She did the bike last week and felt that that helped a lot.  Lately her migraines have been associated with nausea and spins. She gets dizzy if she moves too quickly and feels poor awareness of where she is when she is hiking and this gets worse when she turns her head.    Precautions: chronic venous insufficiency right leg, chronic DVT R leg, hypothyroidism, migraine                                                                  Vital Signs: not taken     Pain  Pain Assessment  Pain Assessment: 0-10  0-10 (Numeric) Pain Score: 2  Pain Type: Chronic pain  Pain Location: Neck    Objective      Pt. Arrived to visit with no device    Convergence=5cm  Saccades=WNL  Smooth pursuits horizontal/vertical/diagonal=WNL (eye strain with diagonals)        1. Vestibular migraine  A.At least 5 episodes with vestibular symptoms1 of moderate or severe intensity2, lasting 5?min to 72 hours3  B.Current or previous history of migraine with or without aura according to the International Classification of Headache Disorders (ICHD-3)4  C.One or more migraine features with at least 50% of the vestibular episodes5:  -headache with at least two of the following characteristics: one sided location, pulsating quality, moderate or severe pain intensity, aggravation by routine physical activity  -photophobia and phonophobia6,  -visual aura7  D.Not better accounted for by another vestibular or ICHD diagnosis8       Treatments:    There Ex:  -thread the needle x10 each side  -cat cow x10  -educated pt. On performing  "optokinetic videos daily to desensitize to visual stimuli, starting at tolerance and working up to 5-10 min.    Neuro Re-Ed:  -smooth pursuits on diagonals x5 reps total, discussed keeping within 12-18 inch range and not going to end range  -convergence examination  -saccade examination horizontal/vertical  -discussed oculomotor examination being WNL, no need for neuro optometrist at this time    Manual therapy:  -Myofascial release bilateral UT R>L  -Suboccipital release bilaterally  -C1/C2 MET with eye movement for upper cervical rotation to the L and R    OP EDUCATION:  -see treatment  -thread the needle  -optokinetic videos          Goals:  Active       PT Problem       PT Goal 1       Start:  03/09/25    Expected End:  06/02/25       Pt. Will demonstrate reduced neck pain with flexion ranges in order to perform activities such as reading.         PT Goal 2       Start:  03/09/25    Expected End:  06/02/25       Pt. Will return to tolerating 4 hours of screen time with necessary modifications in order to build up tolerance of screen time to return to work.         PT Goal 3       Start:  03/09/25    Expected End:  06/02/25       Pt. Will complete YFRF in next 1-2 visits         PT Goal 4       Start:  03/09/25    Expected End:  06/02/25       Pt. Will Improve PSFS by 3 points in order to meet MCID for significant change compared to normative values.         PT Goal 5       Start:  03/09/25    Expected End:  06/02/25       Pt. Will be independent in HEP in next 1-2 visits to promote self efficacy, manage symptoms and meet other LTG.          Patient Stated Goal 1       Start:  03/09/25    Expected End:  06/02/25       \"almost 0 on concussion -4 symptoms-scored at 4 then December concussion happened better manual therapy of neck and makes a huge difference/exercise tolerance.         PT Goal 1       Start:  03/09/25    Expected End:  06/02/25       Pt. Will tolerate exercise above HR of 140 BPM without headache to " indicate improved cardiovascular endurance and activity tolerance.         FGA       Start:  03/28/25    Expected End:  06/02/25       Pt. Will improve FGA by 3 points in order to indicate improved dynamic balance by end of POC.

## 2025-04-09 ENCOUNTER — OFFICE VISIT (OUTPATIENT)
Dept: SPORTS MEDICINE | Facility: HOSPITAL | Age: 40
End: 2025-04-09
Payer: COMMERCIAL

## 2025-04-09 DIAGNOSIS — M99.01 SOMATIC DYSFUNCTION OF CERVICAL REGION: ICD-10-CM

## 2025-04-09 DIAGNOSIS — M99.00 SOMATIC DYSFUNCTION OF HEAD REGION: ICD-10-CM

## 2025-04-09 DIAGNOSIS — M99.02 SOMATIC DYSFUNCTION OF THORACIC REGION: ICD-10-CM

## 2025-04-09 DIAGNOSIS — M99.08 SOMATIC DYSFUNCTION OF RIB CAGE REGION: ICD-10-CM

## 2025-04-09 DIAGNOSIS — S06.0X0A CONCUSSION WITHOUT LOSS OF CONSCIOUSNESS, INITIAL ENCOUNTER: ICD-10-CM

## 2025-04-09 DIAGNOSIS — M99.09 SOMATIC DYSFUNCTION OF ABDOMINAL REGION: ICD-10-CM

## 2025-04-09 DIAGNOSIS — M99.07 SOMATIC DYSFUNCTION OF UPPER EXTREMITY: ICD-10-CM

## 2025-04-09 DIAGNOSIS — S16.1XXA CERVICAL STRAIN, ACUTE, INITIAL ENCOUNTER: Primary | ICD-10-CM

## 2025-04-09 DIAGNOSIS — M99.03 SOMATIC DYSFUNCTION OF LUMBAR REGION: ICD-10-CM

## 2025-04-09 PROCEDURE — 1036F TOBACCO NON-USER: CPT | Performed by: PEDIATRICS

## 2025-04-09 PROCEDURE — 99214 OFFICE O/P EST MOD 30 MIN: CPT | Mod: 25 | Performed by: PEDIATRICS

## 2025-04-09 PROCEDURE — 98928 OSTEOPATH MANJ 7-8 REGIONS: CPT | Performed by: PEDIATRICS

## 2025-04-09 PROCEDURE — 99214 OFFICE O/P EST MOD 30 MIN: CPT | Performed by: PEDIATRICS

## 2025-04-09 ASSESSMENT — PAIN - FUNCTIONAL ASSESSMENT: PAIN_FUNCTIONAL_ASSESSMENT: 0-10

## 2025-04-09 ASSESSMENT — PAIN SCALES - GENERAL: PAINLEVEL_OUTOF10: 2
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
## 2025-04-11 ENCOUNTER — TELEPHONE (OUTPATIENT)
Dept: PRIMARY CARE | Facility: CLINIC | Age: 40
End: 2025-04-11
Payer: COMMERCIAL

## 2025-04-11 NOTE — TELEPHONE ENCOUNTER
Pt calling, states the Tirosint that you sent in for her she has started getting a rash and feeling pretty terrible on it, heart rate is up in the 120's. The Levothyroxine made her feel the same but she also had migraines and dizziness, she wants to know if she should have brand name Synthroid? Does she need to have labs done again?

## 2025-04-14 DIAGNOSIS — E03.9 HYPOTHYROIDISM, UNSPECIFIED TYPE: Primary | ICD-10-CM

## 2025-04-15 ENCOUNTER — APPOINTMENT (OUTPATIENT)
Dept: ORTHOPEDIC SURGERY | Facility: CLINIC | Age: 40
End: 2025-04-15
Payer: COMMERCIAL

## 2025-04-15 VITALS — BODY MASS INDEX: 36.25 KG/M2 | HEIGHT: 62 IN | WEIGHT: 197 LBS

## 2025-04-15 DIAGNOSIS — M54.2 CERVICAL PAIN (NECK): ICD-10-CM

## 2025-04-15 DIAGNOSIS — S06.0X0A CONCUSSION WITHOUT LOSS OF CONSCIOUSNESS, INITIAL ENCOUNTER: Primary | ICD-10-CM

## 2025-04-15 DIAGNOSIS — M99.03 SOMATIC DYSFUNCTION OF LUMBAR REGION: ICD-10-CM

## 2025-04-15 DIAGNOSIS — M99.08 SOMATIC DYSFUNCTION OF RIB CAGE REGION: ICD-10-CM

## 2025-04-15 DIAGNOSIS — M99.00 SOMATIC DYSFUNCTION OF HEAD REGION: ICD-10-CM

## 2025-04-15 DIAGNOSIS — S06.0X0D CONCUSSION WITHOUT LOSS OF CONSCIOUSNESS, SUBSEQUENT ENCOUNTER: ICD-10-CM

## 2025-04-15 DIAGNOSIS — S16.1XXS CERVICAL STRAIN, SEQUELA: ICD-10-CM

## 2025-04-15 DIAGNOSIS — M99.01 SOMATIC DYSFUNCTION OF CERVICAL REGION: ICD-10-CM

## 2025-04-15 DIAGNOSIS — M99.07 SOMATIC DYSFUNCTION OF UPPER EXTREMITY: ICD-10-CM

## 2025-04-15 DIAGNOSIS — M99.02 SOMATIC DYSFUNCTION OF THORACIC REGION: ICD-10-CM

## 2025-04-15 PROCEDURE — 1036F TOBACCO NON-USER: CPT | Performed by: PEDIATRICS

## 2025-04-15 PROCEDURE — 98927 OSTEOPATH MANJ 5-6 REGIONS: CPT | Performed by: PEDIATRICS

## 2025-04-15 PROCEDURE — 3008F BODY MASS INDEX DOCD: CPT | Performed by: PEDIATRICS

## 2025-04-15 PROCEDURE — 99214 OFFICE O/P EST MOD 30 MIN: CPT | Performed by: PEDIATRICS
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
## 2025-04-22 ENCOUNTER — TREATMENT (OUTPATIENT)
Dept: PHYSICAL THERAPY | Facility: CLINIC | Age: 40
End: 2025-04-22
Payer: COMMERCIAL

## 2025-04-22 DIAGNOSIS — R42 DIZZINESS: Primary | ICD-10-CM

## 2025-04-22 DIAGNOSIS — M43.6 NECK STIFFNESS: ICD-10-CM

## 2025-04-22 DIAGNOSIS — M54.2 NECK PAIN: ICD-10-CM

## 2025-04-22 DIAGNOSIS — S06.0X0A CONCUSSION WITHOUT LOSS OF CONSCIOUSNESS, INITIAL ENCOUNTER: ICD-10-CM

## 2025-04-22 PROCEDURE — 97110 THERAPEUTIC EXERCISES: CPT | Mod: GP | Performed by: PHYSICAL THERAPIST

## 2025-04-22 ASSESSMENT — PAIN - FUNCTIONAL ASSESSMENT: PAIN_FUNCTIONAL_ASSESSMENT: 0-10

## 2025-04-22 ASSESSMENT — PAIN SCALES - GENERAL: PAINLEVEL_OUTOF10: 2

## 2025-04-22 NOTE — PROGRESS NOTES
Physical Therapy    Physical Therapy Treatment    Patient Name: Rachelle Laurent  MRN: [id number]  Today's Date: 4/22/2025  Leti  Visit number: 3  Primary dx=dizziness  Time Entry:   Time Calculation  Start Time: 1203  Stop Time: 1300  Time Calculation (min): 57 min     PT Therapeutic Procedures Time Entry  Therapeutic Exercise Time Entry: 57       Assessment:  Today's visit was spent establishing HEP for scapular stabilization strengthening. Pt. Required minimal cues for proper technique, however with practice the patient improved.  The patient completed cardiovascular exercise today utilizing RPE vs. HR due to pt. Recently starting a beta blocker.     Plan: manual therapy PRN, start cardiovascular program       Current Problem  1. Dizziness        2. Concussion without loss of consciousness, initial encounter  Follow Up In Physical Therapy      3. Neck stiffness        4. Neck pain                  Subjective     Pt. Reports that she had an infusion last weak for her thyroid and since then her HR has been very high in the morning. She switched back to her old thyroid pill and that has helped. She went to neurology last week.  She started metoprolol and that has helped her HR in the morning.  She feels like she cannot bend down and she has had an increase in phonosensitivity and photosensitivity. She tried to go for a speed walk and it is hard to get her  and she had she felt achiness.  Her dizziness is better and felt better when she was walking, less foggy and less present. If she is on her phone for a couple of minutes and then looks away she looks more lightheadedness.   She has taken amytriptiline and topamax in the past but the amytriptiline ditn work due to serotonin syndrome.  She has a low grade headache right now 1/10. She is working on getting in with her endocrinologist.  He neck pain is okay.      Precautions: chronic venous insufficiency right leg, chronic DVT R leg, hypothyroidism,  "migraine                                                                  Vital Signs: not taken     Pain  Pain Assessment  Pain Assessment: 0-10  0-10 (Numeric) Pain Score: 2  Pain Type: Chronic pain  Pain Location: Head    Objective      Pt. Arrived to visit with no device             Treatments:    There Ex:  Scapular stabilization exercises x2 rounds:  -wall sit with HABD with RTB x10 with 3\" hold  -standing bent over rows with 5# weights  -\"Y\"s with yellow band 1x10  -treadmill at 1.5mph for warm up 5'x1 RPE 2  -base pace treadmill 2.4 with 0.5% incline for 8 minutes, RPE=5  -treadmill cool down 5'x1 at 1.4mph, RPE=2    OP EDUCATION:  -see treatment  -thread the needle  -optokinetic videos          Goals:  Active       PT Problem       PT Goal 1       Start:  03/09/25    Expected End:  06/02/25       Pt. Will demonstrate reduced neck pain with flexion ranges in order to perform activities such as reading.         PT Goal 2       Start:  03/09/25    Expected End:  06/02/25       Pt. Will return to tolerating 4 hours of screen time with necessary modifications in order to build up tolerance of screen time to return to work.         PT Goal 3       Start:  03/09/25    Expected End:  06/02/25       Pt. Will complete YFRF in next 1-2 visits         PT Goal 4       Start:  03/09/25    Expected End:  06/02/25       Pt. Will Improve PSFS by 3 points in order to meet MCID for significant change compared to normative values.         PT Goal 5       Start:  03/09/25    Expected End:  06/02/25       Pt. Will be independent in HEP in next 1-2 visits to promote self efficacy, manage symptoms and meet other LTG.          Patient Stated Goal 1       Start:  03/09/25    Expected End:  06/02/25       \"almost 0 on concussion -4 symptoms-scored at 4 then December concussion happened better manual therapy of neck and makes a huge difference/exercise tolerance.         PT Goal 1       Start:  03/09/25    Expected End:  06/02/25    "    Pt. Will tolerate exercise above HR of 140 BPM without headache to indicate improved cardiovascular endurance and activity tolerance.         FGA       Start:  03/28/25    Expected End:  06/02/25       Pt. Will improve FGA by 3 points in order to indicate improved dynamic balance by end of POC.

## 2025-04-24 LAB
T3FREE SERPL-MCNC: 3.2 PG/ML (ref 2.3–4.2)
T4 FREE SERPL-MCNC: 1.1 NG/DL (ref 0.8–1.8)
TSH SERPL-ACNC: 1.27 MIU/L

## 2025-04-29 ENCOUNTER — TREATMENT (OUTPATIENT)
Dept: PHYSICAL THERAPY | Facility: CLINIC | Age: 40
End: 2025-04-29
Payer: COMMERCIAL

## 2025-04-29 DIAGNOSIS — R42 DIZZINESS: Primary | ICD-10-CM

## 2025-04-29 DIAGNOSIS — S06.0X0A CONCUSSION WITHOUT LOSS OF CONSCIOUSNESS, INITIAL ENCOUNTER: ICD-10-CM

## 2025-04-29 PROCEDURE — 97140 MANUAL THERAPY 1/> REGIONS: CPT | Mod: GP | Performed by: PHYSICAL THERAPIST

## 2025-04-29 PROCEDURE — 97110 THERAPEUTIC EXERCISES: CPT | Mod: GP | Performed by: PHYSICAL THERAPIST

## 2025-04-29 ASSESSMENT — PAIN SCALES - GENERAL
PAINLEVEL_OUTOF10: 2
PAINLEVEL_OUTOF10: 2

## 2025-04-29 ASSESSMENT — PAIN - FUNCTIONAL ASSESSMENT: PAIN_FUNCTIONAL_ASSESSMENT: 0-10

## 2025-04-29 NOTE — PROGRESS NOTES
Physical Therapy    Physical Therapy Treatment    Patient Name: Rachelle Laurent  MRN: [id number]  Today's Date: 4/29/2025  Leti  Visit number: 5  Primary dx=dizziness  Time Entry:   Time Calculation  Start Time: 1106  Stop Time: 1200  Time Calculation (min): 54 min     PT Therapeutic Procedures Time Entry  Manual Therapy Time Entry: 45  Therapeutic Exercise Time Entry: 14       Assessment:  Ms. Laurent has completed 5/10 of her overall visits for physical therapy and is reaching approval date of 5/3/25.  The patient is making slow but steady progress in her dizziness. Shabana patient has multiple co-morbidities that may be contributing to her symptoms including hx. Of migraines, multiple concussions and neck pain.  The patient has met 2/2 of her long term goals and 0/6 of her short term goals. The patient is benefiting from manual therapy based exercises in addition to scapular stabilization exercises.   Pt. Continues to present with left sided upper cervical rotation with reduced right sided cervical ROM.  The patient also presents with hypertonicity on her upper trapezius musculature.  Provided pt. With progressive cardiovascular program from the [facility] protocol, due to pt. Having high HR and autonomic symptoms.  Although pt. With no official diagnosis of POTS, we discussed that the patient would benefit from completing regular cardiovascular exercise within target HR zones.  Recommended that the patient start later in the program due to pt. Being high level and able to tolerate cardiovascular exercise in an upright position.  Ms. Laurent will benefit from continued physical therapy to address the above deficits, reduce neck pain and dizziness and improve QOL.     Plan: manual therapy PRN, lower extremity and core strengthening       Current Problem  1. Dizziness        2. Concussion without loss of consciousness, initial encounter  Follow Up In Physical Therapy              Subjective    Pt. Reports that she  was out of town and her disorientation and vertigo and dizziness was less. She struggled the first day she was out of town and now that she is back home she feels worse.  She did some hiking while she was gone. She had bad vertigo the first day she was there.  She was also on her period at the time, no migraine at the time but she did have a headache after the hike.  She thinks she didn't eat quickly enough after the hike.  She went off of the beta blocker.  After she left here the other day she had therapy and she had a hard time catching her breath while talking.  She had a low grade headache the entire time she was on the beta blocker and she was getting the spins when she would look at her phone for a couple minutes afterwards. She is going to start the topamax this week. She went on a difficult hike on Sunday and did great and got her HR up to 160.  She has had chest pain for a week, she isnt sure if it is from the infusion.  Her right knee gets painful if she does a lot of stairs due to PVT.  Her back feels a little tight, she has gotten a new bed.      Precautions: chronic venous insufficiency right leg, chronic DVT R leg, hypothyroidism, migraine                                                                  Vital Signs: not taken     Pain  Pain Assessment  Pain Assessment: 0-10  0-10 (Numeric) Pain Score: 2  Pain Type: Chronic pain  Pain Location: Head  Multiple Pain Sites: Three  Pain 2  Pain Score 2: 2  Pain Type 2: Chronic pain, Acute pain  Pain Location 2: Back    Objective      Pt. Arrived to visit with no device    Base [phone]  WOQ=[phone]           Treatments:    There Ex:  -provided pt. With [facility] protocol for progressive cardiovascular exercise  -calculated base pace and TAB HR zones    Manual therapy:  -PA mobilization on T1-T4 prone  -MET ocular technique for L sided upper cervical rotation  -myofascial release for bilateral UT R>L, suboccipitals  -1st rib mobilization on R side  -TPR L  "piriformis    OP EDUCATION:  -see treatment  -thread the needle  -optokinetic videos          Goals:  Active       PT Problem       PT Goal 1       Start:  03/09/25    Expected End:  06/02/25       Pt. Will demonstrate reduced neck pain with flexion ranges in order to perform activities such as reading.         PT Goal 2       Start:  03/09/25    Expected End:  06/02/25       Pt. Will return to tolerating 4 hours of screen time with necessary modifications in order to build up tolerance of screen time to return to work.         PT Goal 3       Start:  03/09/25    Expected End:  06/02/25       Pt. Will complete YFRF in next 1-2 visits         PT Goal 4       Start:  03/09/25    Expected End:  06/02/25       Pt. Will Improve PSFS by 3 points in order to meet MCID for significant change compared to normative values.         PT Goal 5       Start:  03/09/25    Expected End:  06/02/25       Pt. Will be independent in HEP in next 1-2 visits to promote self efficacy, manage symptoms and meet other LTG.          Patient Stated Goal 1       Start:  03/09/25    Expected End:  06/02/25       \"almost 0 on concussion -4 symptoms-scored at 4 then December concussion happened better manual therapy of neck and makes a huge difference/exercise tolerance.         PT Goal 1       Start:  03/09/25    Expected End:  06/02/25       Pt. Will tolerate exercise above HR of 140 BPM without headache to indicate improved cardiovascular endurance and activity tolerance.         FGA       Start:  03/28/25    Expected End:  06/02/25       Pt. Will improve FGA by 3 points in order to indicate improved dynamic balance by end of POC.                 "

## 2025-05-06 ENCOUNTER — TREATMENT (OUTPATIENT)
Dept: PHYSICAL THERAPY | Facility: CLINIC | Age: 40
End: 2025-05-06
Payer: COMMERCIAL

## 2025-05-06 DIAGNOSIS — S06.0X0A CONCUSSION WITHOUT LOSS OF CONSCIOUSNESS, INITIAL ENCOUNTER: ICD-10-CM

## 2025-05-06 DIAGNOSIS — M99.01 CERVICAL SOMATIC DYSFUNCTION: ICD-10-CM

## 2025-05-06 DIAGNOSIS — R42 DIZZINESS: Primary | ICD-10-CM

## 2025-05-06 PROCEDURE — 97140 MANUAL THERAPY 1/> REGIONS: CPT | Mod: GP | Performed by: PHYSICAL THERAPIST

## 2025-05-06 PROCEDURE — 97110 THERAPEUTIC EXERCISES: CPT | Mod: GP | Performed by: PHYSICAL THERAPIST

## 2025-05-06 ASSESSMENT — PAIN SCALES - GENERAL: PAINLEVEL_OUTOF10: 10 - WORST POSSIBLE PAIN

## 2025-05-06 ASSESSMENT — PAIN - FUNCTIONAL ASSESSMENT: PAIN_FUNCTIONAL_ASSESSMENT: 0-10

## 2025-05-06 NOTE — PROGRESS NOTES
"Physical Therapy    Physical Therapy Treatment    Patient Name: Rachelle Laurent  MRN: [id number]  Today's Date: 5/6/2025  Leopoldobalwinder  Visit number: 6  Primary dx=dizziness  Time Entry:   Time Calculation  Start Time: 1135  Stop Time: 1230  Time Calculation (min): 55 min     PT Therapeutic Procedures Time Entry  Manual Therapy Time Entry: 16  Therapeutic Exercise Time Entry: 39       Assessment:  Ms. Laurent demonstrated good response to soft tissue techniques today with reduce neck pain following myofascial techniques. Pt. May benefit from dry needling to address trigger points in cervical musculature.  The patient reported difficulty getting HR reduced to baseline with cool downs with the CHOP, therefore we discussed going back to month 2 in order to improve ability to control her HR with diaphragmatic breathing. Time was spent reviewing core strengthening exercises, pt with difficulty maintaining neutral spine especially when lifting LLE which indicates weakness on the right side.    Plan: manual therapy PRN, lower extremity and core strengthening       Current Problem  1. Dizziness        2. Concussion without loss of consciousness, initial encounter  Follow Up In Physical Therapy      3. Cervical somatic dysfunction                  Subjective    Pt. Reports that she was feeling dizzy walking her dog today if she had to look down for long periods of time.  She gardening yesterday and she feels \"wrecked\" and she is feeling very sore today.  She had to stop gardening because she had a hard time with the kneeling and crouching due to dizziness and a headache kicked in.  She felt exhausted after 45 min to an hour. She was on a presipus of a migraine the rest of the night.  She is planning on going back on topamax.  The cardio is going okay, she hasn't been able to keep up with the calendar. This weekend she had to clean out a bedroom.  She did one work out on Thursday/Friday and it was the 10 minutes total of " [facility], she got a little chest pain at [facility]. She was able to get her heart rate back down into the teens but couldn't bring it down fully.  She got short of breath hiking last week, she did a breathing test but it came back fine.  She is going to start using an inhaler before exercise.  She did strength training 2 times last week.     Precautions: chronic venous insufficiency right leg, chronic DVT R leg, hypothyroidism, migraine                                                                  Vital Signs: not taken     Pain  Pain Assessment  Pain Assessment: 0-10  0-10 (Numeric) Pain Score: 10 - Worst possible pain  Pain Type: Chronic pain    Objective      Pt. Arrived to visit with no device    Base vyao=[phone]  YPU=[phone]           Treatments:    There Ex:  -bridges with GTB with hip marches x10  -supine dead bug   -bird dog x10 with TC for neutral pelvis  -diaphragmatic breathing 3 minutes  -dicussed starting back at month 2 for CHOP due to pt. With difficulty bringing HR back down to baseline on month 4 of CHOP    Manual therapy:  -TPR bilateral UT, suboccipitals  -TPR L piriformis    OP EDUCATION:  -see treatment  -thread the needle  -optokinetic videos   -lower trap setting, bent over rows, HABD against wall  -bridges, bird dog, dead bugs  -CHOP month 2         Goals:  Active       PT Problem       PT Goal 1 (Progressing)       Start:  03/09/25    Expected End:  06/02/25       Pt. Will demonstrate reduced neck pain with flexion ranges in order to perform activities such as reading.         PT Goal 2 (Progressing)       Start:  03/09/25    Expected End:  06/02/25       Pt. Will return to tolerating 4 hours of screen time with necessary modifications in order to build up tolerance of screen time to return to work.         PT Goal 3 (Met)       Start:  03/09/25    Expected End:  06/02/25    Resolved:  04/30/25    Pt. Will complete YFRF in next 1-2 visits         PT Goal 4 (Progressing)       Start:   "03/09/25    Expected End:  06/02/25       Pt. Will Improve PSFS by 3 points in order to meet MCID for significant change compared to normative values.         PT Goal 5 (Met)       Start:  03/09/25    Expected End:  06/02/25    Resolved:  04/30/25    Pt. Will be independent in HEP in next 1-2 visits to promote self efficacy, manage symptoms and meet other LTG.          Patient Stated Goal 1       Start:  03/09/25    Expected End:  06/02/25       \"almost 0 on concussion -4 symptoms-scored at 4 then December concussion happened better manual therapy of neck and makes a huge difference/exercise tolerance.         PT Goal 1 (Progressing)       Start:  03/09/25    Expected End:  06/02/25       Pt. Will tolerate exercise above HR of 140 BPM without headache to indicate improved cardiovascular endurance and activity tolerance.         FGA (Progressing)       Start:  03/28/25    Expected End:  06/02/25       Pt. Will improve FGA by 3 points in order to indicate improved dynamic balance by end of POC.                   "

## 2025-05-13 ENCOUNTER — TREATMENT (OUTPATIENT)
Dept: PHYSICAL THERAPY | Facility: CLINIC | Age: 40
End: 2025-05-13
Payer: COMMERCIAL

## 2025-05-13 DIAGNOSIS — E03.9 HYPOTHYROIDISM, UNSPECIFIED TYPE: ICD-10-CM

## 2025-05-13 DIAGNOSIS — E03.9 HYPOTHYROIDISM, UNSPECIFIED TYPE: Primary | ICD-10-CM

## 2025-05-13 DIAGNOSIS — S16.1XXA NECK STRAIN: ICD-10-CM

## 2025-05-13 DIAGNOSIS — M43.6 NECK STIFFNESS: ICD-10-CM

## 2025-05-13 DIAGNOSIS — S06.0X0A CONCUSSION WITHOUT LOSS OF CONSCIOUSNESS, INITIAL ENCOUNTER: ICD-10-CM

## 2025-05-13 DIAGNOSIS — R42 DIZZINESS: Primary | ICD-10-CM

## 2025-05-13 DIAGNOSIS — M54.2 NECK PAIN: ICD-10-CM

## 2025-05-13 PROCEDURE — 97140 MANUAL THERAPY 1/> REGIONS: CPT | Mod: GP | Performed by: PHYSICAL THERAPIST

## 2025-05-13 PROCEDURE — 97110 THERAPEUTIC EXERCISES: CPT | Mod: GP | Performed by: PHYSICAL THERAPIST

## 2025-05-13 RX ORDER — LEVOTHYROXINE SODIUM 100 UG/1
100 TABLET ORAL DAILY
Qty: 90 TABLET | Refills: 3 | Status: SHIPPED | OUTPATIENT
Start: 2025-05-13 | End: 2025-05-13

## 2025-05-13 RX ORDER — LEVOTHYROXINE SODIUM 100 UG/1
100 CAPSULE ORAL DAILY
Qty: 90 CAPSULE | Refills: 3 | Status: SHIPPED | OUTPATIENT
Start: 2025-05-13

## 2025-05-13 ASSESSMENT — PAIN SCALES - GENERAL: PAINLEVEL_OUTOF10: 1

## 2025-05-13 ASSESSMENT — PAIN - FUNCTIONAL ASSESSMENT: PAIN_FUNCTIONAL_ASSESSMENT: 0-10

## 2025-05-13 NOTE — PROGRESS NOTES
Physical Therapy    Physical Therapy Treatment    Patient Name: Rachelle Laurent  MRN: [id number]  Today's Date: 5/13/2025  Leti  Visit number: 7  Primary dx=dizziness  Time Entry:   Time Calculation  Start Time: 1133  Stop Time: 1230  Time Calculation (min): 57 min     PT Therapeutic Procedures Time Entry  Manual Therapy Time Entry: 16  Therapeutic Exercise Time Entry: 41       Assessment:  Ms. Laurent is presenting with appropriate physiological response during base pace to recovery transitions during [facility] protocol.  Pt. Presented difficulty getting HR back down closer to resting rate requiring the full 10 minute cool-down.  The patient reports that she feels that her dizziness is better. Overall, her pain levels have been lower in her neck since beginning of POC. However pt. With s/s consistent with thoracic outlet syndrome on her left side. Pt. With hypertonicity noted in pectoral muscles, erector spinae, and upper trapezius on left side    Plan: manual as needed, Dry needling, habituation training.    Current Problem  1. Dizziness        2. Concussion without loss of consciousness, initial encounter  Follow Up In Physical Therapy      3. Neck stiffness        4. Neck pain        5. Neck strain                  Subjective    Pt. Reports that she has been cleaning a lot in her home and she feels that has been feeling a little bit better.  She went back to month one of the CHOP protocol and she had a hard time getting her heart rate down to her baseline. Her migraines have been better lately.  She feels that the infusion medication is causing some of her problems with migraines.  She feels like 4 weeks of her infusions is too frequently.  Her dizziness has been pretty good. She had a couple of random times where she would move to quickly and she would feel it, its not quite as frequent.  She did a lot of yard work and she does this with a stool.  She has a little bit of neck pain today, her arms were going  numb when she was sleeping today.  If she goes more than 2 days without the strengthening exercises that she has more agitation in her neck muscles. She gest pain in her abdominals that day or the day after.      Precautions: chronic venous insufficiency right leg, chronic DVT R leg, hypothyroidism, migraine                                                                  Vital Signs: not taken     Pain  Pain Assessment  Pain Assessment: 0-10  0-10 (Numeric) Pain Score: 1  Pain Type: Chronic pain  Pain Location: Neck    Objective      Pt. Arrived to visit with no device    HR sitting resting=88 BPM  Base pxgf=[phone]  FLV=[phone]    Palpation: hypertonicity noted in bilateral UT, L>R cervical erector spinae, L>R suboccipitals, L pec minor           Treatments:    There Ex:  -recumbent bike CHOP week 4 day 1:   Warm-up =5'x1, Level 1-3 RPE=1, RX=842 BPM   Base pace= 6'x1, level 1, BM=802 BPM, RPE=3-4, KF=[phone]   Recovery= 3'x1 (HG=776 BPM)   Base pace=5'x1 (JK=940 BPM)   Cool down=10'x1 (HR=91 BPM)    Manual therapy:  -TPR bilateral UT, bilateral suboccipital, bilateral cervical erector spinae    OP EDUCATION:  -see treatment  -thread the needle  -optokinetic videos   -lower trap setting, bent over rows, HABD against wall  -bridges with march, bird dog, dead bugs  -CHOP month 1         Goals:  Active       PT Problem       PT Goal 1 (Progressing)       Start:  03/09/25    Expected End:  06/02/25       Pt. Will demonstrate reduced neck pain with flexion ranges in order to perform activities such as reading.         PT Goal 2 (Progressing)       Start:  03/09/25    Expected End:  06/02/25       Pt. Will return to tolerating 4 hours of screen time with necessary modifications in order to build up tolerance of screen time to return to work.         PT Goal 3 (Met)       Start:  03/09/25    Expected End:  06/02/25    Resolved:  04/30/25    Pt. Will complete YFRF in next 1-2 visits         PT Goal 4 (Progressing)        "Start:  03/09/25    Expected End:  06/02/25       Pt. Will Improve PSFS by 3 points in order to meet MCID for significant change compared to normative values.         PT Goal 5 (Met)       Start:  03/09/25    Expected End:  06/02/25    Resolved:  04/30/25    Pt. Will be independent in HEP in next 1-2 visits to promote self efficacy, manage symptoms and meet other LTG.          Patient Stated Goal 1       Start:  03/09/25    Expected End:  06/02/25       \"almost 0 on concussion -4 symptoms-scored at 4 then December concussion happened better manual therapy of neck and makes a huge difference/exercise tolerance.         PT Goal 1 (Progressing)       Start:  03/09/25    Expected End:  06/02/25       Pt. Will tolerate exercise above HR of 140 BPM without headache to indicate improved cardiovascular endurance and activity tolerance.         FGA (Progressing)       Start:  03/28/25    Expected End:  06/02/25       Pt. Will improve FGA by 3 points in order to indicate improved dynamic balance by end of POC.                     "

## 2025-05-19 ENCOUNTER — OFFICE VISIT (OUTPATIENT)
Dept: PRIMARY CARE | Facility: CLINIC | Age: 40
End: 2025-05-19
Payer: COMMERCIAL

## 2025-05-19 VITALS
WEIGHT: 198 LBS | SYSTOLIC BLOOD PRESSURE: 128 MMHG | TEMPERATURE: 98.4 F | BODY MASS INDEX: 36.21 KG/M2 | DIASTOLIC BLOOD PRESSURE: 70 MMHG | HEART RATE: 91 BPM | OXYGEN SATURATION: 98 %

## 2025-05-19 DIAGNOSIS — E87.1 HYPONATREMIA: ICD-10-CM

## 2025-05-19 DIAGNOSIS — R30.0 BURNING WITH URINATION: ICD-10-CM

## 2025-05-19 DIAGNOSIS — R35.0 FREQUENT URINATION: ICD-10-CM

## 2025-05-19 DIAGNOSIS — R00.2 PALPITATIONS: Primary | ICD-10-CM

## 2025-05-19 DIAGNOSIS — K59.00 CONSTIPATION, UNSPECIFIED CONSTIPATION TYPE: ICD-10-CM

## 2025-05-19 LAB
POC APPEARANCE, URINE: CLEAR
POC BILIRUBIN, URINE: NEGATIVE
POC BLOOD, URINE: NEGATIVE
POC COLOR, URINE: NORMAL
POC GLUCOSE, URINE: NEGATIVE MG/DL
POC KETONES, URINE: NEGATIVE MG/DL
POC LEUKOCYTES, URINE: NEGATIVE
POC NITRITE,URINE: NEGATIVE
POC PH, URINE: 6 PH
POC PROTEIN, URINE: NEGATIVE MG/DL
POC SPECIFIC GRAVITY, URINE: 1.01
POC UROBILINOGEN, URINE: 0.2 EU/DL

## 2025-05-19 PROCEDURE — 81003 URINALYSIS AUTO W/O SCOPE: CPT | Performed by: NURSE PRACTITIONER

## 2025-05-19 PROCEDURE — 99214 OFFICE O/P EST MOD 30 MIN: CPT | Performed by: NURSE PRACTITIONER

## 2025-05-19 PROCEDURE — 93000 ELECTROCARDIOGRAM COMPLETE: CPT | Performed by: NURSE PRACTITIONER

## 2025-05-19 RX ORDER — METOPROLOL SUCCINATE 50 MG/1
50 TABLET, EXTENDED RELEASE ORAL
COMMUNITY
Start: 2025-04-14 | End: 2025-05-19 | Stop reason: SINTOL

## 2025-05-19 RX ORDER — INHALER, ASSIST DEVICES
SPACER (EA) MISCELLANEOUS
COMMUNITY
Start: 2025-05-01

## 2025-05-19 RX ORDER — RIZATRIPTAN BENZOATE 5 MG/1
5 TABLET ORAL
COMMUNITY
Start: 2025-04-14

## 2025-05-19 ASSESSMENT — ENCOUNTER SYMPTOMS
HEADACHES: 1
ORTHOPNEA: 0
WHEEZING: 0
FEVER: 0
ABDOMINAL PAIN: 0
SPUTUM PRODUCTION: 1
RHINORRHEA: 0
LEG PAIN: 0
HEMOPTYSIS: 0
VOMITING: 0
SYNCOPE: 0
SORE THROAT: 0
CLAUDICATION: 1
PND: 0
NECK PAIN: 1
SHORTNESS OF BREATH: 1
SWOLLEN GLANDS: 0

## 2025-05-19 NOTE — PATIENT INSTRUCTIONS
Your urine today did not show any evidence of an infection.  Check updated CMP and CBC given palpitations.  Be sure to stay hydrated.  Avoid caffeine.  Let me know if palpitations become persistent or more frequent.  MiraLAX for constipation.  Let me know if symptoms do not improve or should become worse.

## 2025-05-19 NOTE — PROGRESS NOTES
Subjective   Rachelle Laurent is a 39 y.o. female who presents for Palpitations (Heart palpitations with high HR remaining 93 BMP. Pt reports when walking from living room to kitchen(20-30ft) HR goes to 125BMP. ), Abdominal Pain (Left Abdominal Pain been happening for 1.5-2 months. Has had pain that radiates to left back.), Shortness of Breath (Yard work today but has been more SOB with exercising. Was given inhaler by immunologist.), and UTI (Frequent urination and burning with urination).    Shortness of Breath  This is a recurrent problem. The current episode started more than 1 month ago. The problem occurs intermittently. The problem has been waxing and waning. Associated symptoms include abdominal pain, claudication, headaches, leg swelling, neck pain and sputum production. Pertinent negatives include no chest pain, coryza, ear pain, fever, hemoptysis, leg pain, orthopnea, PND, rash, rhinorrhea, sore throat, swollen glands, syncope, vomiting or wheezing. The symptoms are aggravated by smoke, exercise, odors, fumes, pollens and weather changes.     She presents to the office today to discuss several concerns.   She reports she has been having issues with heart rate being elevated at times.  Working with PT- on an exercise program for POTS.  She has had palpitations several times over the years.   Feels this increased in frequency since switching form subcutaneous Gammagard back to IV.  Also started Solumedrol prior to infusions once a month which she feels may be triggering palpitations.   Hearts feels like it is beating hard and fast.  Has also noticed HR can be a little higher than usual at times.  Today was raking leaves developed coughing fit, SOB. No wheezing.   Used Albuterol inhaler and symptoms resolved.  Has been using prior to exercise which has also helped.  No chest pain recently.   No lightheaded or dizziness.  Gets dizzy hiking sometimes. Feels this is related to dysautonomia from the prior  "concussion.    She also reports she has been having intermittent left lower abdominal pain.   (+) nausea all the time but also can be worse when the pain flares.  No vomiting.   Appetite is ok.  No fever or chills.   Has been constipated.  Has a BM 2-3 a day- \"rabbit pellets.\"  No blood but has had some mucous.  Has tried to see if could related to menstrual cycle.    Has also had a few days of burning with urination.  Better now.   Has had a little bit of frequency and urgency.  No flank pain except for when she has infusions.  Gets some lower back pain at times with the abdominal pain.    (+) night sweats around period      Review of Systems   Constitutional:  Positive for fatigue. Negative for chills and fever.   HENT:  Negative for ear pain, rhinorrhea and sore throat.    Respiratory:  Positive for sputum production and shortness of breath. Negative for hemoptysis and wheezing.    Cardiovascular:  Positive for claudication and leg swelling. Negative for chest pain, orthopnea, syncope and PND.   Gastrointestinal:  Positive for abdominal pain, constipation and nausea. Negative for blood in stool, diarrhea and vomiting.   Musculoskeletal:  Positive for neck pain.   Skin:  Negative for rash.   Neurological:  Positive for headaches.     Objective   /70 (BP Location: Left arm, Patient Position: Sitting)   Pulse 91   Temp 36.9 °C (98.4 °F) (Oral)   Wt 89.8 kg (198 lb)   SpO2 98%   BMI 36.21 kg/m²     Physical Exam  Constitutional:       General: She is not in acute distress.     Appearance: Normal appearance. She is not toxic-appearing.   Neck:      Thyroid: No thyroid mass or thyromegaly.   Cardiovascular:      Rate and Rhythm: Normal rate and regular rhythm. Frequent Extrasystoles are present.     Pulses: Normal pulses.      Heart sounds: Normal heart sounds, S1 normal and S2 normal. No murmur heard.  Pulmonary:      Effort: Pulmonary effort is normal. No accessory muscle usage or respiratory distress.     "  Breath sounds: Normal breath sounds and air entry.   Abdominal:      General: Bowel sounds are normal.      Palpations: Abdomen is soft.      Tenderness: There is generalized abdominal tenderness. There is no right CVA tenderness, left CVA tenderness, guarding or rebound.   Musculoskeletal:      Right lower leg: No edema.      Left lower leg: No edema.   Lymphadenopathy:      Cervical: No cervical adenopathy.   Neurological:      Mental Status: She is alert and oriented to person, place, and time.   Psychiatric:         Mood and Affect: Mood normal.         Behavior: Behavior normal.         Thought Content: Thought content normal.         Judgment: Judgment normal.         Assessment/Plan   Problem List Items Addressed This Visit       Palpitations - Primary    Relevant Orders    ECG 12 lead (Clinic Performed) (Completed)    Comprehensive Metabolic Panel    CBC and Auto Differential     Other Visit Diagnoses         Frequent urination        Relevant Orders    POCT UA Automated manually resulted (Completed)      Burning with urination        Relevant Orders    POCT UA Automated manually resulted (Completed)      Hyponatremia        Relevant Orders    Comprehensive Metabolic Panel      Constipation, unspecified constipation type            EKG shows frequent PVC's- will check updated labs.   UA was normal today.   Recommended Miralax for constipation.    It has been a pleasure seeing you today!

## 2025-05-20 ASSESSMENT — ENCOUNTER SYMPTOMS
NAUSEA: 1
CHILLS: 0
BLOOD IN STOOL: 0
FATIGUE: 1
CONSTIPATION: 1
DIARRHEA: 0
ABDOMINAL PAIN: 1

## 2025-05-21 LAB
ALBUMIN SERPL-MCNC: 4.5 G/DL (ref 3.6–5.1)
ALP SERPL-CCNC: 58 U/L (ref 31–125)
ALT SERPL-CCNC: 30 U/L (ref 6–29)
ANION GAP SERPL CALCULATED.4IONS-SCNC: 8 MMOL/L (CALC) (ref 7–17)
AST SERPL-CCNC: 22 U/L (ref 10–30)
BASOPHILS # BLD AUTO: 59 CELLS/UL (ref 0–200)
BASOPHILS NFR BLD AUTO: 1.3 %
BILIRUB SERPL-MCNC: 0.3 MG/DL (ref 0.2–1.2)
BUN SERPL-MCNC: 10 MG/DL (ref 7–25)
CALCIUM SERPL-MCNC: 9.5 MG/DL (ref 8.6–10.2)
CHLORIDE SERPL-SCNC: 103 MMOL/L (ref 98–110)
CO2 SERPL-SCNC: 24 MMOL/L (ref 20–32)
CREAT SERPL-MCNC: 0.68 MG/DL (ref 0.5–0.97)
EGFRCR SERPLBLD CKD-EPI 2021: 114 ML/MIN/1.73M2
EOSINOPHIL # BLD AUTO: 18 CELLS/UL (ref 15–500)
EOSINOPHIL NFR BLD AUTO: 0.4 %
ERYTHROCYTE [DISTWIDTH] IN BLOOD BY AUTOMATED COUNT: 13.2 % (ref 11–15)
GLUCOSE SERPL-MCNC: 88 MG/DL (ref 65–99)
HCT VFR BLD AUTO: 40.8 % (ref 35–45)
HGB BLD-MCNC: 13 G/DL (ref 11.7–15.5)
LYMPHOCYTES # BLD AUTO: 1764 CELLS/UL (ref 850–3900)
LYMPHOCYTES NFR BLD AUTO: 39.2 %
MCH RBC QN AUTO: 28.1 PG (ref 27–33)
MCHC RBC AUTO-ENTMCNC: 31.9 G/DL (ref 32–36)
MCV RBC AUTO: 88.1 FL (ref 80–100)
MONOCYTES # BLD AUTO: 504 CELLS/UL (ref 200–950)
MONOCYTES NFR BLD AUTO: 11.2 %
NEUTROPHILS # BLD AUTO: 2156 CELLS/UL (ref 1500–7800)
NEUTROPHILS NFR BLD AUTO: 47.9 %
PLATELET # BLD AUTO: 299 THOUSAND/UL (ref 140–400)
PMV BLD REES-ECKER: 9.7 FL (ref 7.5–12.5)
POTASSIUM SERPL-SCNC: 4.3 MMOL/L (ref 3.5–5.3)
PROT SERPL-MCNC: 8 G/DL (ref 6.1–8.1)
RBC # BLD AUTO: 4.63 MILLION/UL (ref 3.8–5.1)
SODIUM SERPL-SCNC: 135 MMOL/L (ref 135–146)
WBC # BLD AUTO: 4.5 THOUSAND/UL (ref 3.8–10.8)
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
## 2025-05-23 ENCOUNTER — OFFICE VISIT (OUTPATIENT)
Dept: ORTHOPEDIC SURGERY | Facility: CLINIC | Age: 40
End: 2025-05-23
Payer: COMMERCIAL

## 2025-05-23 VITALS
BODY MASS INDEX: 35.86 KG/M2 | WEIGHT: 194.89 LBS | SYSTOLIC BLOOD PRESSURE: 136 MMHG | DIASTOLIC BLOOD PRESSURE: 72 MMHG | HEART RATE: 93 BPM | HEIGHT: 62 IN

## 2025-05-23 DIAGNOSIS — M99.08 SOMATIC DYSFUNCTION OF RIB CAGE REGION: ICD-10-CM

## 2025-05-23 DIAGNOSIS — S06.0X0D CONCUSSION WITHOUT LOSS OF CONSCIOUSNESS, SUBSEQUENT ENCOUNTER: Primary | ICD-10-CM

## 2025-05-23 DIAGNOSIS — M99.09 SOMATIC DYSFUNCTION OF ABDOMINAL REGION: ICD-10-CM

## 2025-05-23 DIAGNOSIS — M99.01 SOMATIC DYSFUNCTION OF CERVICAL REGION: ICD-10-CM

## 2025-05-23 DIAGNOSIS — M99.07 SOMATIC DYSFUNCTION OF UPPER EXTREMITY: ICD-10-CM

## 2025-05-23 DIAGNOSIS — M99.03 SOMATIC DYSFUNCTION OF LUMBAR REGION: ICD-10-CM

## 2025-05-23 DIAGNOSIS — R74.01 ELEVATED ALT MEASUREMENT: Primary | ICD-10-CM

## 2025-05-23 DIAGNOSIS — M99.02 SOMATIC DYSFUNCTION OF THORACIC REGION: ICD-10-CM

## 2025-05-23 DIAGNOSIS — S16.1XXS CERVICAL STRAIN, SEQUELA: ICD-10-CM

## 2025-05-23 DIAGNOSIS — M99.00 SOMATIC DYSFUNCTION OF HEAD REGION: ICD-10-CM

## 2025-05-23 DIAGNOSIS — G44.86 CERVICOGENIC HEADACHE: ICD-10-CM

## 2025-05-23 DIAGNOSIS — H81.90 DISORDER OF VESTIBULAR FUNCTION, UNSPECIFIED LATERALITY: ICD-10-CM

## 2025-05-23 PROCEDURE — 99214 OFFICE O/P EST MOD 30 MIN: CPT | Performed by: PEDIATRICS

## 2025-05-23 PROCEDURE — 98928 OSTEOPATH MANJ 7-8 REGIONS: CPT | Performed by: PEDIATRICS

## 2025-05-23 PROCEDURE — 3008F BODY MASS INDEX DOCD: CPT | Performed by: PEDIATRICS

## 2025-05-23 PROCEDURE — 99214 OFFICE O/P EST MOD 30 MIN: CPT | Mod: 25 | Performed by: PEDIATRICS

## 2025-05-23 PROCEDURE — 1036F TOBACCO NON-USER: CPT | Performed by: PEDIATRICS

## 2025-05-23 ASSESSMENT — PAIN SCALES - GENERAL: PAINLEVEL_OUTOF10: 1

## 2025-05-27 ENCOUNTER — TREATMENT (OUTPATIENT)
Dept: PHYSICAL THERAPY | Facility: CLINIC | Age: 40
End: 2025-05-27
Payer: COMMERCIAL

## 2025-05-27 ENCOUNTER — APPOINTMENT (OUTPATIENT)
Dept: PHYSICAL THERAPY | Facility: CLINIC | Age: 40
End: 2025-05-27
Payer: COMMERCIAL

## 2025-05-27 DIAGNOSIS — R42 DIZZINESS: Primary | ICD-10-CM

## 2025-05-27 DIAGNOSIS — M43.6 NECK STIFFNESS: ICD-10-CM

## 2025-05-27 DIAGNOSIS — M54.2 NECK PAIN: ICD-10-CM

## 2025-05-27 PROCEDURE — 4200000004 HC PT PHASE II 15 MIN CHG: Mod: GP | Performed by: PHYSICAL THERAPIST

## 2025-05-27 NOTE — PROGRESS NOTES
Physical Therapy    Subjective:   pt. Reports that she had manual manipulation on Friday and felt that it reduced numbness/tingling in her arms during the day.  She is getting numbness/tingling in her arms with sleeping.    Objective:  Palpation=TTP bilateral UT, L>R suboccipitals, L>R cervical erector spinae    Treatment:  dry needling to the following muscle groups:   -bilateral UT 1 needle eachx.25x.30mm needles with pincer , perpendicular direction   -x8 needles x.23x.30 along cervical multifidi, perpendicular direction   -cervical suboccipitals 4 needles,.23x.50mm, perpendicular direction  -The rationale, potential benefits, and risks for dry needling (DN) were discussed with patient. Reviewed precautions to dry needling and written/verbal consent obtained by patient (see chart for written consent). No contraindications present. His/Her questions were answered and he/she agreed to dry needling treatment today. Patient advised that following dry needling he may feel muscle soreness lasting 24-48 hours, and that this is normal.  Patient advised remain hydrated following dry needling. Patient advised to avoid cold pack/ice and NSAIDs for 24-48 hours following DN, if tolerable, to allow normal inflammatory process to occur. Patient may resume normal activity and exercise following DN as able. Patient instructed to please contact the office if any adverse symptoms occur outside of typical muscle soreness.     Assessment:  Pt. With tenderness along left sided cervical erector spinae at C7-T1 junction.  Pt. With trigger point response in left UT.  No residual pain/discomfort post needling today.    Plan:  DN as needed

## 2025-06-03 ENCOUNTER — APPOINTMENT (OUTPATIENT)
Dept: NEUROLOGY | Facility: HOSPITAL | Age: 40
End: 2025-06-03
Payer: COMMERCIAL

## 2025-06-04 ENCOUNTER — OFFICE VISIT (OUTPATIENT)
Dept: OBSTETRICS AND GYNECOLOGY | Facility: CLINIC | Age: 40
End: 2025-06-04
Payer: COMMERCIAL

## 2025-06-04 ASSESSMENT — ENCOUNTER SYMPTOMS
OCCASIONAL FEELINGS OF UNSTEADINESS: 0
LOSS OF SENSATION IN FEET: 0
DEPRESSION: 0

## 2025-06-05 NOTE — PROGRESS NOTES
Chief Complaint   Patient presents with    Concussion       Consulting physician: Eun Aguayo, FADY-CNP    A report with my findings and recommendations will be sent to the primary and referring physician via written or electronic means when information is available    History of Present Illness:  Rachelle Laurent is a 39 y.o. female who presented on 03/12/2025 with PMH of prior concussion x8, migraine, anxiety, OCD, common variable immunodeficiency and Hashimoto's, presenting for cervicogenic headaches. She suffered a concussion 12/6/24. Cervical neck pain treated with OMT.  She has had some improvement in her neck pain but continues to have difficulty with range of motion and tightness on her right cervical spine.  On physical exam, she has limited range of motion of her cervical spine and tenderness palpation hypertonicity of the right trapezius.  OMM was performed 3/12, 3/26, 4/9, 4/15, 5/23. Improved ROM and pain post OMM. Continued neck pain, occasional left arm numbness, and a left sided mild headache. She was encouraged to perform the home stretches regularly.   She is struggling with insurance regarding PT which was helping significantly. Referral placed for PT.     She presents 6/6/25 for new concussion evaluation that occurred on 6/3/25. She was gardening and while transplanting her plant she hit her head on an outdoor umbrella point on the forehead. No LOC. Had a headache right away, slightly spacey, was able to continue gardening, but then got dizzy.   Now, she is feeling foggy, mild headaches, today worse than yesterday. Avoiding screens.     Injury history:  Symptom score: #7, Severity#14, Feels # 75% - sound sensitivity, convergence issues with her eyes when in the car, headaches.     Headache: 1/10    Sleep: Good except was up with a dog they are sitting.     Mood: Fine    Neck pain: Same, no major changes.       Past [facility] HX:  Specialty Problems    None       ROS  12 point ROS reviewed and  is negative except for items listed    Medications:   Current Outpatient Medications on File Prior to Visit   Medication Sig Dispense Refill    acetaminophen (Tylenol) 500 mg tablet Take 2 tablets (1,000 mg) by mouth every 6 hours if needed.      albuterol 90 mcg/actuation inhaler Inhale 2 puffs 4 times a day as needed.      azelastine (Astelin) 137 mcg (0.1 %) nasal spray SPRAY 2 SPRAYS NASALLY TWO TIMES DAILY AS NEEDED      cetirizine (ZyrTEC) 10 mg tablet Take 1 tablet (10 mg) by mouth once daily.      EPINEPHrine 0.3 mg/0.3 mL injection syringe       ergocalciferol (Vitamin D-2) 1.25 MG (07943 UT) capsule Take 1 capsule (1.25 mg) by mouth every 7 days.      fluticasone (Flonase Sensimist) 27.5 mcg/actuation nasal spray Use as directed      ibuprofen (AdviL) 200 mg tablet Take 1 tablet (200 mg) by mouth every 6 hours if needed.      immune globulin, human, (Gammagard Liquid) infusion Infuse 150 mL (15 g) into a venous catheter 1 (one) time per week. (Patient taking differently: Infuse 150 mL (15 g) into a venous catheter every 30 (thirty) days.)      levothyroxine (Tirosint) 100 mcg capsule Take 1 capsule (100 mcg) by mouth early in the morning.. Take on an empty stomach at the same time each day, either 30 to 60 minutes prior to breakfast 90 capsule 3    MAGNESIUM OXIDE ORAL Take 250 mg by mouth once daily.      mupirocin (Bactroban) 2 % ointment Apply as directed      OptiChamber [facility] inhaler USE AS DIRECTED WITH METERED DOSE INHALERS      PARoxetine (Paxil) 30 mg tablet Take 2 tablets (60 mg) by mouth once daily.      RIBOFLAVIN, VITAMIN B2, ORAL Take 1,000 mg by mouth once daily.      rizatriptan (Maxalt) 5 mg tablet Take 1 tablet (5 mg) by mouth.      topiramate (Topamax) 25 mg tablet Take 1 tablet (25 mg) by mouth once daily.      lidocaine-prilocaine (Emla) 2.5-2.5 % cream Apply as directed (Patient not taking: Reported on 6/6/2025)      SUMAtriptan (Imitrex) 50 mg tablet Take 1 tablet (50 mg) by  "mouth 1 time if needed for migraine (may repeat x1) for up to 54 doses. May repeat dose once in 2 hours if no relief.  Do not exceed 2 doses in 24 hours. (Patient not taking: Reported on 6/6/2025) 9 tablet 5     No current facility-administered medications on file prior to visit.         Allergies:    Allergies   Allergen Reactions    Nut - Unspecified Unknown     Almonds - Intolerance     Hazelnuts - Allergy  (Showed on allergy test)    Other Unknown     Seafood     Quinolones Other     Tendonitis     Shellfish Containing Products Unknown    Wheat Unknown    Peanut Unknown    Sulfa (Sulfonamide Antibiotics) Hives        Physical Exam:    Visit Vitals  /70   Pulse 105   Ht 1.59 m (5' 2.6\")   Wt 89 kg (196 lb 5.1 oz)   BMI 35.22 kg/m²   OB Status Having periods   Smoking Status Never   BSA 1.98 m²        Vitals reviewed    General appearance: Well-appearing well-nourished  Psych: Normal mood and affect    Neuro: Normal sensation to light touch throughout the involved extremities  Vascular: No extremity edema or discoloration.  Skin: negative.  Lymphatic: no regional lymphadenopathy present.  Eyes: no conjunctival injection.    Head  Inspection: Atraumatic, no bruising or swelling  Palpation: non-tender     ENT  External inspection of ears, nose, mouth: normal  Hearing: normal  Oropharynx: normal soft palate rise    Optho / Vestibular   Pupils equal and reactive  Convergence: double vision at 2 cm  No nystagmus present  Smooth Pursuits -symptom exacerbation : Yes  Saccades horizontal - symptom exacerbation: yes  Saccades vertical - symptom exacerbation no  VOR horizontal (head rotation)- symptom exacerbation yes  VMS (80 degree trunk rotation side to side) -symptom exacerbation: yes    Cervical Spine Exam  Palpation:  Muscle spasm: negative   Midline tenderness: negative   Paravertebral tenderness: negative     Range of Motion:  Flexion (50-70) full, pain free  Extension (60-85) full, pain free  Right lateral " flexion (40-50)  full, pain free  Left lateral flexion (40-50)  full, pain free  Right rotation (60-75), full, pain free  Left rotation (60-75), full, pain free    Neuro  Limb tone: normal   Deep tendon reflexes: Symmetric and normal  Sensation to light touch: normal  Finger to nose: normal  Fast alternating movements: normal   Cranial nerves: II thru XII are intact   Tandem gait: normal    Strength  Full strength in UE  Full strength in LE    Normal sensation:  C8 dermatome/ulnar nerve: small finger  C7 dermatome/meidan nerve: middle finger  C6 dermatome/radial nerve: thumb   C5 dermatome/axillary nerve: deltoid patch    Reflexes:  Triceps 2+/4  Biceps 2+/4  Brachioradialis 2+/4  Patellar 2+/4     Impression and Plan:  Rachelle Laurent is a 39 y.o. female who presented on 03/12/2025 with PMH of prior concussion x8, migraine, anxiety, OCD, common variable immunodeficiency and Hashimoto's, presenting for cervicogenic headaches. She suffered a concussion 12/6/24. Cervical neck pain treated with OMT.  She has had some improvement in her neck pain but continues to have difficulty with range of motion and tightness on her right cervical spine.  On physical exam, she has limited range of motion of her cervical spine and tenderness palpation hypertonicity of the right trapezius.  OMM was performed 3/12, 3/26, 4/9, 4/15, 5/23. Improved ROM and pain post OMM. Continued neck pain, occasional left arm numbness, and a left sided mild headache. She was encouraged to perform the home stretches regularly.   She is struggling with insurance regarding PT which was helping significantly. Referral placed for PT.     She presents 6/6/25 for new concussion evaluation. She hit her head on an umbrella when gardening and had a headache and some difficulty with tracking causing symptoms. She denies any LOC. On exam, neurologically intact. Some symptom exacerbation with VORs, VOMs, and tracking.   Plan:   - Concussion management as  below  - Vestibulo-ocular PT  - Follow up in 2-4 weeks    Tonny Hmamonds, DO  [facility] Fellow     I saw and evaluated the patient. I personally obtained the key and critical portions of the history and physical exam or was physically present for key and critical portions performed by the resident/fellow. I reviewed the resident/fellow's documentation and discussed the patient with the resident/fellow. I agree with the resident/fellow's medical decision making as documented in the note.    ** Please excuse any errors in grammar or translation related to this dictation. Voice recognition software was utilized to prepare this document. **

## 2025-06-06 ENCOUNTER — OFFICE VISIT (OUTPATIENT)
Dept: ORTHOPEDIC SURGERY | Facility: CLINIC | Age: 40
End: 2025-06-06
Payer: COMMERCIAL

## 2025-06-06 VITALS
SYSTOLIC BLOOD PRESSURE: 139 MMHG | DIASTOLIC BLOOD PRESSURE: 70 MMHG | WEIGHT: 196.32 LBS | BODY MASS INDEX: 34.79 KG/M2 | HEART RATE: 105 BPM | HEIGHT: 63 IN

## 2025-06-06 DIAGNOSIS — S06.0X0A CONCUSSION WITHOUT LOSS OF CONSCIOUSNESS, INITIAL ENCOUNTER: Primary | ICD-10-CM

## 2025-06-06 PROCEDURE — 99214 OFFICE O/P EST MOD 30 MIN: CPT | Performed by: PEDIATRICS

## 2025-06-06 PROCEDURE — 3008F BODY MASS INDEX DOCD: CPT | Performed by: PEDIATRICS

## 2025-06-06 PROCEDURE — 99212 OFFICE O/P EST SF 10 MIN: CPT | Performed by: PEDIATRICS

## 2025-06-06 PROCEDURE — 1036F TOBACCO NON-USER: CPT | Performed by: PEDIATRICS

## 2025-06-06 ASSESSMENT — ENCOUNTER SYMPTOMS
LOSS OF SENSATION IN FEET: 1
OCCASIONAL FEELINGS OF UNSTEADINESS: 0
DEPRESSION: 0

## 2025-06-06 ASSESSMENT — PAIN SCALES - GENERAL: PAINLEVEL_OUTOF10: 2

## 2025-06-17 ENCOUNTER — CLINICAL SUPPORT (OUTPATIENT)
Dept: PHYSICAL THERAPY | Facility: CLINIC | Age: 40
End: 2025-06-17
Payer: COMMERCIAL

## 2025-06-17 DIAGNOSIS — R68.89 DECREASED ACTIVITY TOLERANCE: ICD-10-CM

## 2025-06-17 DIAGNOSIS — M43.6 NECK STIFFNESS: ICD-10-CM

## 2025-06-17 DIAGNOSIS — M54.2 NECK PAIN: ICD-10-CM

## 2025-06-17 DIAGNOSIS — S06.0X0A CONCUSSION WITHOUT LOSS OF CONSCIOUSNESS, INITIAL ENCOUNTER: ICD-10-CM

## 2025-06-17 DIAGNOSIS — R42 DIZZINESS: ICD-10-CM

## 2025-06-17 DIAGNOSIS — F07.81 POSTCONCUSSION SYNDROME: Primary | ICD-10-CM

## 2025-06-17 PROCEDURE — 97161 PT EVAL LOW COMPLEX 20 MIN: CPT | Mod: GP | Performed by: PHYSICAL THERAPIST

## 2025-06-17 PROCEDURE — 97112 NEUROMUSCULAR REEDUCATION: CPT | Mod: GP | Performed by: PHYSICAL THERAPIST

## 2025-06-17 ASSESSMENT — ENCOUNTER SYMPTOMS
DEPRESSION: 0
LOSS OF SENSATION IN FEET: 0
OCCASIONAL FEELINGS OF UNSTEADINESS: 0

## 2025-06-17 NOTE — PROGRESS NOTES
Physical Therapy    Physical Therapy Evaluation and Treatment      Patient Name: Rachelle Laurent  MRN: [id number]  Today's Date: 6/20/2025  Lidia  Primary dx=post concussion syndrome  Visit number: 1   Time Entry:   Time Calculation  Start Time: 1735  Stop Time: 1825  Time Calculation (min): 50 min  PT Evaluation Time Entry  PT Evaluation (Low) Time Entry: 25  PT Therapeutic Procedures Time Entry  Neuromuscular Re-Education Time Entry: 25       Assessment:  Patient is a 39 year old female referred to [facility]'s outpatient physical therapy services with a chief complaint of acute dizziness, headaches and sound sensitivity that occurred after hitting her head on 6/3/25 and has been diagnosed with post concussion syndrome.  Ms. Laurent's function has declined and has not been able to use her phone or drive as a result of her symptoms.  Pt. Scored a 19 on the VOMS with her most severe symptoms of HA occurring with convergence, and visual motion sensitivity however pt. Had symtpoms of HA with smooth pursuits and saccades as well.  The patient is also reporting increased difficulties with falling asleep and staying asleep.  Ms. Laurent will benefit from vestibular specific physical therapy to address the above deficits and improve QOL.        Plan: add saccades and smooth pursuits to HEP  OP PT Plan  Treatment/Interventions: Canalith repositioning, Cryotherapy, Education/ Instruction, Gait training, Neuromuscular re-education, Manual therapy, Self care/ home management, Taping techniques, Therapeutic activities, Therapeutic exercises, Hot pack  PT Plan: Skilled PT  PT Frequency: 1 time per week  Duration: 8 visits  Onset Date: 06/06/25  Certification Period Start Date: 06/17/25  Certification Period End Date: 09/15/25  Rehab Potential: Good    Current Problem:   1. Postconcussion syndrome  Follow Up In Physical Therapy      2. Concussion without loss of consciousness, initial encounter  Referral to  "Physical Therapy    Follow Up In Physical Therapy      3. Decreased activity tolerance  Follow Up In Physical Therapy      4. Neck pain        5. Dizziness        6. Neck stiffness            Subjective    Pt. Reports that she was gardening and she dug a plant up and the way her patio is set up she had a couch and umbrella and the umbrella was tipped differently.  She has been feeling okay. She feels that she is recovering quicker than prior concussions.  She is trying neuro feedback this week.  She has had dizziness, headaches and sensitivity to noise and it varies.  Today she is worse with noise and yesterday she was worse with light.  She started topamax the day of the concussion and she is on a 1/2 of a pill, she tried to go up to a whole pill but it made her more \"spacey and more headachy\".  She hasn't gotten her eyes checked in a while.  She had a headache yesterday after her infusion but didn't have a full blown migraine which was unusual.  She had a bad reaction to dry needling the last time she got a horrible migraine the day afterwards and her neck hurt really bad and sensitivity to light.   She is getting head pressure and pain with bending over or with standing.  She is going out of town July 19th.  Since she switched pillows she is feeling better, not as bad of neck pain and hasn't had to stretch as much.    CLOF: wearing sun glasses, avoiding television and phone, she isnt driving right now.  She has tried to  exercise and she is walking and she is doing this every day but didn't make it yesterday.   She will get a headache if she is looking at a screen.  Right now she is feeling motion sensitivity.     Hobbies: hiking but hasn't done this since concussion  Physical Activity: walking every day  Occupation: unemployed, she used to  and reporting [employer] , currently looking for jobs   Sleep: has had a harder time sleeping at night, harder time staying asleep  Hydration: she doesn't " "retain sodium , trying to drink propel and alkaline water  Pt. Goal: \"less sensitivity to sound\"      General  Reason for Referral: concussion without loss of consciousness  Referred By: Dr. Gladis Nolan DO  Preferred Learning Style: visual, kinesthetic    Precautions: no fall risk, chronic venous insufficiency of right leg, hx. DVT, hypothyroidism  Precautions  STEADI Fall Risk Score (The score of 4 or more indicates an increased risk of falling): 0  Vital Signs: not taken     Pain: 2/10 faces pain scale  Pain Assessment  Pain Assessment: Blanton-Baker FACES  Blanton-Baker FACES Pain Rating: Hurts little bit  Pain Type: Chronic pain         Objective   Vestibular assessment:    Gait=reciprocal pattern, no device, normal speed  sensory deficits=small fiber peripheral neuropathy in feet   visual deficits=glasses for distance  hearing loss=WNL  Ocular ROM=WNL    VOMS:  Smooth pursuits=2/10 HA  Saccades horizontal=1/0 headache  Saccades vertical=1/0 dizzy, 1/10 HA  Convergence=3/10 HA (4.3cm)  VOR horizontal=2/10 HA, 1/10 nausea  VOR vertical=1/10 HA, 1/10 dizzy  VMS=2/10 HA, 2/10 dizziness, 2/10 nausea  (TOTAL=19)    Symptom rating scale=27/132    Treatments:    Neuro Re-Ed:  -educated pt. On habituation training with cell phone, lights and driving, discussed starting at short 1-2 minute duration and increasing duration with practice, discussed habituation with all of these activities 2 times a day  -binocular pencil push-ups holding gaze 10 seconds x2-3 reps, goal of 10 reps    EDUCATION:  Outpatient Education  Individual(s) Educated: Patient  Education Provided: Home Exercise Program, Home Safety, Physiology, Anatomy  Risk and Benefits Discussed with Patient/Caregiver/Other: yes  Patient/Caregiver Demonstrated Understanding: yes  Plan of Care Discussed and Agreed Upon: yes  Patient Response to Education: Patient/Caregiver Verbalized Understanding of Information    Goals:    Yeni Armstrong, PT       Active       PT Problem " "      PT Goal 1 LTG: Pt. Will complete 1 minute of VOR cancellation in standing to indicate reduced visual motion sensitivity by end of POC.       Start:  25    Expected End:  09/15/25            PT Goal 2 STG: Pt. Will complete convergence with out headache by end of POC to improve quality of life with ADLS such as reading.       Start:  25    Expected End:  09/15/25            PT Goal 3 LTG: pt. will demonstrate improved score on Symptom rating scale in the sleep category to improve QOL.       Start:  25    Expected End:  09/15/25            PT Goal 4 STG: Pt. Will be independent in HEP in next 1-2 visits to promote self efficacy, manage symptoms and meet other LTG.        Start:  25    Expected End:  09/15/25            PT Goal 5 STG: pt. will be able to drive 15 minutes in next 4 weeks to indicate improved QOL.       Start:  25    Expected End:  09/15/25            Patient Stated Goal 1: Pt. Goal: \"less sensitivity to sound\"          Start:  25    Expected End:  09/15/25            PT Goal 6 LTG: pt. will return to hobby of hiking by end of POC.       Start:  25    Expected End:  09/15/25                PT Goal 7 LTG: Pt. will improve VOMS score by 10 points by end of POC.       Start:  25    Expected End:  09/15/25                    Insurance Authorization Information  Date of Evaluation: 25    Onset Date: 2025    Referring Physician: Gladis Nolan     Surgery in the Last 3 months:  no    CPT Codes: Therapeutic Exercise: 09230, Therapeutic Activity: 93593, Neuromuscular Re-Education: 60153, Manual Therapy: 57491, and Self-Care/Home Management Trainin    Diagnosis:   Problem List Items Addressed This Visit           ICD-10-CM       Musculoskeletal and Injuries    Neck pain M54.2    Neck stiffness M43.6       Neuro    Concussion with no loss of consciousness S06.0X0A    Relevant Orders    Follow Up In Physical Therapy    Postconcussion syndrome - " Primary F07.81    Relevant Orders    Follow Up In Physical Therapy       Symptoms and Signs    Dizziness R42    Decreased activity tolerance R68.89    Relevant Orders    Follow Up In Physical Therapy          Functional Outcome:    VOMS    OT / PT Evaluation complexity:  moderate    Which of the following best describes the primary reason of therapy: Improving, restoring, or adapting functional mobility or skills    Visits Requested: 8    Date Range: 90 days    Select all conditions that apply: Immunosuppression       Yeni Armstrong, PT

## 2025-06-19 ASSESSMENT — PAIN - FUNCTIONAL ASSESSMENT: PAIN_FUNCTIONAL_ASSESSMENT: WONG-BAKER FACES

## 2025-06-19 ASSESSMENT — PAIN SCALES - WONG BAKER: WONGBAKER_NUMERICALRESPONSE: HURTS LITTLE BIT

## 2025-06-20 ENCOUNTER — APPOINTMENT (OUTPATIENT)
Dept: ORTHOPEDIC SURGERY | Facility: CLINIC | Age: 40
End: 2025-06-20
Payer: COMMERCIAL

## 2025-06-23 DIAGNOSIS — R74.01 ELEVATED ALT MEASUREMENT: ICD-10-CM

## 2025-06-25 ENCOUNTER — OFFICE VISIT (OUTPATIENT)
Dept: SPORTS MEDICINE | Facility: HOSPITAL | Age: 40
End: 2025-06-25
Payer: COMMERCIAL

## 2025-06-25 VITALS — HEART RATE: 88 BPM | DIASTOLIC BLOOD PRESSURE: 84 MMHG | OXYGEN SATURATION: 98 % | SYSTOLIC BLOOD PRESSURE: 125 MMHG

## 2025-06-25 DIAGNOSIS — S06.0X0A CONCUSSION WITHOUT LOSS OF CONSCIOUSNESS, INITIAL ENCOUNTER: Primary | ICD-10-CM

## 2025-06-25 PROCEDURE — 1036F TOBACCO NON-USER: CPT | Performed by: PEDIATRICS

## 2025-06-25 PROCEDURE — 99214 OFFICE O/P EST MOD 30 MIN: CPT | Performed by: PEDIATRICS

## 2025-06-25 PROCEDURE — 99212 OFFICE O/P EST SF 10 MIN: CPT | Performed by: PEDIATRICS

## 2025-06-25 ASSESSMENT — PAIN - FUNCTIONAL ASSESSMENT: PAIN_FUNCTIONAL_ASSESSMENT: 0-10

## 2025-06-25 ASSESSMENT — PAIN SCALES - GENERAL: PAINLEVEL_OUTOF10: 1

## 2025-06-25 NOTE — PROGRESS NOTES
No chief complaint on file.      Consulting physician: Eun Aguayo, FADY-CNP    A report with my findings and recommendations will be sent to the primary and referring physician via written or electronic means when information is available    History of Present Illness:  Rachelle Laurent is a 39 y.o. female who presented on 03/12/2025 with PMH of prior concussion x8, migraine, anxiety, OCD, common variable immunodeficiency and Hashimoto's, presenting for cervicogenic headaches. She suffered a concussion 12/6/24. Cervical neck pain treated with OMT.  She has had some improvement in her neck pain but continues to have difficulty with range of motion and tightness on her right cervical spine.  On physical exam, she has limited range of motion of her cervical spine and tenderness palpation hypertonicity of the right trapezius.  OMM was performed 3/12, 3/26, 4/9, 4/15, 5/23. Improved ROM and pain post OMM. Continued neck pain, occasional left arm numbness, and a left sided mild headache. She was encouraged to perform the home stretches regularly.   She is struggling with insurance regarding PT which was helping significantly. Referral placed for PT.     She presents 6/6/25 for new concussion evaluation that occurred on 6/3/25. She was gardening and while transplanting her plant she hit her head on an outdoor umbrella point on the forehead. No LOC. Had a headache right away, slightly spacey, was able to continue gardening, but then got dizzy.   Now, she is feeling foggy, mild headaches, today worse than yesterday. Avoiding screens.     6/6/25 Symptom score: #7, Severity#14, Feels # 75% - sound sensitivity, convergence issues with her eyes when in the car, headaches. (1/10)  6/2/25 Symptom score 11, severity 17, Feels 85%. Fatigue, Bothered by noise, sensitivity to light, Feels slowed down, foggy, Headache 2/10    Thursday neurofeedback evaluation - listening to music 6.5 minutes. Speech Therapist in [place]. Not  [facility]. No brain mapping.  Rachelle was nervous about the evaluation.  Had a migraine afterwards.  Nausea. Felt dizzy afterwards.   6/16/25 Monday IV infusion. Sinus infection.   Felt congested, headaches, coughing.   6/23/25 Monday gardening, drove to [employer], grocery shopping. Watching TV and reading. Overdid it. Too many activities. Yesterday was rough secondarily.     Taking Topamax 12.5mg Daily.      Walking 30 minutes daily. [employer].  Prefers walking over biking.  Has stationery bike.     Sleep: Some difficulty.  Frequent awakenings.  Going to bed 10:30pm. Awake by 8:30am/9am.     Mood: Fine    Neck pain: Same, no major changes.       Past [facility] HX:  Specialty Problems    None       ROS  12 point ROS reviewed and is negative except for items listed    Medications:   Current Outpatient Medications on File Prior to Visit   Medication Sig Dispense Refill    acetaminophen (Tylenol) 500 mg tablet Take 2 tablets (1,000 mg) by mouth every 6 hours if needed.      albuterol 90 mcg/actuation inhaler Inhale 2 puffs 4 times a day as needed.      azelastine (Astelin) 137 mcg (0.1 %) nasal spray SPRAY 2 SPRAYS NASALLY TWO TIMES DAILY AS NEEDED      cetirizine (ZyrTEC) 10 mg tablet Take 1 tablet (10 mg) by mouth once daily.      EPINEPHrine 0.3 mg/0.3 mL injection syringe       ergocalciferol (Vitamin D-2) 1.25 MG (41650 UT) capsule Take 1 capsule (1.25 mg) by mouth every 7 days.      fluticasone (Flonase Sensimist) 27.5 mcg/actuation nasal spray Use as directed      ibuprofen (AdviL) 200 mg tablet Take 1 tablet (200 mg) by mouth every 6 hours if needed.      immune globulin, human, (Gammagard Liquid) infusion Infuse 150 mL (15 g) into a venous catheter 1 (one) time per week. (Patient taking differently: Infuse 150 mL (15 g) into a venous catheter every 30 (thirty) days.)      levothyroxine (Tirosint) 100 mcg capsule Take 1 capsule (100 mcg) by mouth early in the morning.. Take on an empty stomach at the same time  each day, either 30 to 60 minutes prior to breakfast 90 capsule 3    lidocaine-prilocaine (Emla) 2.5-2.5 % cream Apply as directed (Patient not taking: Reported on 6/6/2025)      MAGNESIUM OXIDE ORAL Take 250 mg by mouth once daily.      mupirocin (Bactroban) 2 % ointment Apply as directed      OptiChamber Elizabeth VHC inhaler USE AS DIRECTED WITH METERED DOSE INHALERS      PARoxetine (Paxil) 30 mg tablet Take 2 tablets (60 mg) by mouth once daily.      RIBOFLAVIN, VITAMIN B2, ORAL Take 1,000 mg by mouth once daily.      rizatriptan (Maxalt) 5 mg tablet Take 1 tablet (5 mg) by mouth.      SUMAtriptan (Imitrex) 50 mg tablet Take 1 tablet (50 mg) by mouth 1 time if needed for migraine (may repeat x1) for up to 54 doses. May repeat dose once in 2 hours if no relief.  Do not exceed 2 doses in 24 hours. (Patient not taking: Reported on 6/6/2025) 9 tablet 5    topiramate (Topamax) 25 mg tablet Take 1 tablet (25 mg) by mouth once daily.       No current facility-administered medications on file prior to visit.         Allergies:    Allergies   Allergen Reactions    Nut - Unspecified Unknown     Almonds - Intolerance     Hazelnuts - Allergy  (Showed on allergy test)    Other Unknown     Seafood     Quinolones Other     Tendonitis     Shellfish Containing Products Unknown    Wheat Unknown    Peanut Unknown    Sulfa (Sulfonamide Antibiotics) Hives        Physical Exam:    Visit Vitals  OB Status Having periods   Smoking Status Never        Vitals reviewed    General appearance: Well-appearing well-nourished  Psych: Normal mood and affect    Neuro: Normal sensation to light touch throughout the involved extremities  Vascular: No extremity edema or discoloration.  Skin: negative.  Lymphatic: no regional lymphadenopathy present.  Eyes: no conjunctival injection.    Head  Inspection: Atraumatic, no bruising or swelling  Palpation: non-tender     ENT  External inspection of ears, nose, mouth: normal  Hearing: normal  Oropharynx:  normal soft palate rise    Optho / Vestibular   Pupils equal and reactive  Convergence: double vision at 2 cm  No nystagmus present  Smooth Pursuits -symptom exacerbation : Yes  Saccades horizontal - symptom exacerbation: yes  Saccades vertical - symptom exacerbation no  VOR horizontal (head rotation)- symptom exacerbation yes  VMS (80 degree trunk rotation side to side) -symptom exacerbation: yes    Cervical Spine Exam  Palpation:  Muscle spasm: negative   Midline tenderness: negative   Paravertebral tenderness: negative     Range of Motion:  Flexion (50-70) full, pain free  Extension (60-85) full, pain free  Right lateral flexion (40-50)  full, pain free  Left lateral flexion (40-50)  full, pain free  Right rotation (60-75), full, pain free  Left rotation (60-75), full, pain free    Neuro  Limb tone: normal   Deep tendon reflexes: Symmetric and normal  Sensation to light touch: normal  Finger to nose: normal  Fast alternating movements: normal   Cranial nerves: II thru XII are intact   Tandem gait: normal    Strength  Full strength in UE  Full strength in LE    Normal sensation:  C8 dermatome/ulnar nerve: small finger  C7 dermatome/meidan nerve: middle finger  C6 dermatome/radial nerve: thumb   C5 dermatome/axillary nerve: deltoid patch    Reflexes:  Triceps 2+/4  Biceps 2+/4  Brachioradialis 2+/4  Patellar 2+/4     Impression and Plan:  Rachelle Laurent is a 39 y.o. female who presented on 03/12/2025 with PMH of prior concussion x8, migraine, anxiety, OCD, common variable immunodeficiency and Hashimoto's, presenting for cervicogenic headaches. She suffered a concussion 12/6/24. Cervical neck pain treated with OMT.  She has had some improvement in her neck pain but continues to have difficulty with range of motion and tightness on her right cervical spine.  On physical exam, she has limited range of motion of her cervical spine and tenderness palpation hypertonicity of the right trapezius.  OMM was performed  3/12, 3/26, 4/9, 4/15, 5/23. Improved ROM and pain post OMM. Continued neck pain, occasional left arm numbness, and a left sided mild headache. She was encouraged to perform the home stretches regularly.   She is struggling with insurance regarding PT which was helping significantly. Referral placed for PT.     She presents 6/6/25 for new concussion evaluation. She hit her head on an umbrella when gardening and had a headache and some difficulty with tracking causing symptoms. She denies any LOC. On exam, neurologically intact. Some symptom exacerbation with VORs, VOMs, and tracking.   6/6/25 Symptom score: #7, Severity#14, Feels # 75% - sound sensitivity, convergence issues with her eyes when in the car, headaches. (1/10)  6/2/25 Symptom score 11, severity 17, Feels 85%. Fatigue, Bothered by noise, sensitivity to light, Feels slowed down, foggy, Headache 2/10    Plan:   - Concussion management protocol  - Vestibulo-ocular PT  - Follow up in 2-4 weeks      ** Please excuse any errors in grammar or translation related to this dictation. Voice recognition software was utilized to prepare this document. **

## 2025-06-26 ENCOUNTER — APPOINTMENT (OUTPATIENT)
Dept: PHYSICAL THERAPY | Facility: CLINIC | Age: 40
End: 2025-06-26
Payer: COMMERCIAL

## 2025-07-01 ENCOUNTER — APPOINTMENT (OUTPATIENT)
Dept: PHYSICAL THERAPY | Facility: CLINIC | Age: 40
End: 2025-07-01
Payer: COMMERCIAL

## 2025-07-02 ENCOUNTER — APPOINTMENT (OUTPATIENT)
Dept: OBSTETRICS AND GYNECOLOGY | Facility: CLINIC | Age: 40
End: 2025-07-02
Payer: COMMERCIAL

## 2025-07-02 VITALS
HEART RATE: 99 BPM | WEIGHT: 197 LBS | DIASTOLIC BLOOD PRESSURE: 81 MMHG | BODY MASS INDEX: 34.91 KG/M2 | SYSTOLIC BLOOD PRESSURE: 118 MMHG | HEIGHT: 63 IN

## 2025-07-02 DIAGNOSIS — R30.9 PAINFUL URINATION: ICD-10-CM

## 2025-07-02 DIAGNOSIS — M62.89 PELVIC FLOOR DYSFUNCTION: Primary | ICD-10-CM

## 2025-07-02 DIAGNOSIS — N89.8 VAGINAL DISCHARGE: ICD-10-CM

## 2025-07-02 LAB
POC APPEARANCE, URINE: CLEAR
POC BILIRUBIN, URINE: NEGATIVE
POC BLOOD, URINE: NEGATIVE
POC COLOR, URINE: YELLOW
POC GLUCOSE, URINE: NEGATIVE MG/DL
POC KETONES, URINE: NEGATIVE MG/DL
POC LEUKOCYTES, URINE: NEGATIVE
POC NITRITE,URINE: NEGATIVE
POC PH, URINE: 6.5 PH
POC PROTEIN, URINE: NEGATIVE MG/DL
POC SPECIFIC GRAVITY, URINE: 1.01
POC UROBILINOGEN, URINE: 0.2 EU/DL

## 2025-07-02 PROCEDURE — 99213 OFFICE O/P EST LOW 20 MIN: CPT | Performed by: NURSE PRACTITIONER

## 2025-07-02 PROCEDURE — 3008F BODY MASS INDEX DOCD: CPT | Performed by: NURSE PRACTITIONER

## 2025-07-02 PROCEDURE — 81003 URINALYSIS AUTO W/O SCOPE: CPT | Mod: QW | Performed by: NURSE PRACTITIONER

## 2025-07-02 ASSESSMENT — PAIN SCALES - GENERAL: PAINLEVEL_OUTOF10: 0-NO PAIN

## 2025-07-02 ASSESSMENT — ENCOUNTER SYMPTOMS
RESPIRATORY NEGATIVE: 1
NEUROLOGICAL NEGATIVE: 1
OCCASIONAL FEELINGS OF UNSTEADINESS: 0
EYES NEGATIVE: 1
DYSURIA: 1
LOSS OF SENSATION IN FEET: 0
DEPRESSION: 0
PSYCHIATRIC NEGATIVE: 1
ENDOCRINE NEGATIVE: 1
CARDIOVASCULAR NEGATIVE: 1
GASTROINTESTINAL NEGATIVE: 1
CONSTITUTIONAL NEGATIVE: 1
MUSCULOSKELETAL NEGATIVE: 1

## 2025-07-02 NOTE — PROGRESS NOTES
Subjective   Patient ID: Rachelle Laurent is a 39 y.o. female who presents for Urinary Problem (Pt c/o having pain when urinating).    HPI    Urinary Symptoms:  - She has been having painful urination intermittently, typically around the menstrual cycle, including right after the period, during ovulation, or a few days before the period.  - Painful urination has been occurring for approximately a year.  - Describes a weird odor and leakage after urination.  - She denies having a UTI.    Pelvic Symptoms:  - She has a hx of pelvic pain, which is suspected to be related to endometriosis.    OBGYN History & Sexual Activity:  - She has ovary/uterus pain almost all the time.  - She hasn't been on any birth control due to hx of DVT in 2016.  - She has had dyspareunia, which has improved since a polyp was removed from the cervix.    POCT UA: negative.    Additional History:  - She's in PT for concussion that she had recently.  - She's had 9-10 since fall of 2021.      Review of Systems   Constitutional: Negative.    HENT: Negative.     Eyes: Negative.    Respiratory: Negative.     Cardiovascular: Negative.    Gastrointestinal: Negative.    Endocrine: Negative.    Genitourinary:  Positive for dysuria and pelvic pain.        Urinary leakage. No symptoms of yeast infection.   Musculoskeletal: Negative.    Neurological: Negative.    Psychiatric/Behavioral: Negative.         Objective   Physical Exam  Vitals reviewed.   Constitutional:       General: She is not in acute distress.     Appearance: Normal appearance. She is not ill-appearing.   Genitourinary:     General: Normal vulva.      Comments: Discomfort noted in pelvic floor during exam.  Neurological:      Mental Status: She is alert.   Psychiatric:         Mood and Affect: Mood normal.         Behavior: Behavior normal.         Thought Content: Thought content normal.         Judgment: Judgment normal.         Assessment/Plan   39-year-old female being assessed for  dysuria, suspected endometriosis, recurrent yeast infections, pelvic pain, hx of concussions.    Diagnoses:  #1 Dysuria  #2 Suspected endometriosis  #3 Recurrent yeast infections  #4 Pelvic pain  #5 Hx of concussions    Plan:  Dysuria, pelvic pain  - POCT UA: negative.  - Recommended PFPT to address potential muscle tension contributing to bladder discomfort and urinary symptoms after noting some pelvic pain on physical exam. She is amenable.  > Referral to PFPT. Provided list of local therapists.    2. Suspected endometriosis  - She has a hx of suspected endometriosis and is considering surgical exploration.  - Discuss with Dr. Olvera regarding potential for surgical intervention and possibility of IUD placement during the procedure.  - Consideration of patient's immune deficiency and healing concerns is necessary.    3. Recurrent yeast infections  - She describes monthly symptoms suggestive of yeast infections, possibly related to hormonal changes or antibiotic use.  - Continue using Terconazole as needed. Will consider further evaluation if this doesn't improve.    4. Hx of concussions  - She's currently in PT for concussion management and experiences light sensitivity.  - Continue current management and monitor for any changes in symptoms.    Follow-up with PFPT. Discuss surgical options with Dr. Olvera. Return for well-woman exam with DIONNE Timmons along with pap smear in December 2025.    Scribe Attestation  By signing my name below, IBaljinder Scribe, attest that this documentation has been prepared under the direction and in the presence of DIONNE Walsh on 07/02/2025 at 7:02 PM    SPEKE audio duration: 15 minutes    I spent a total of {eConsult Time:77626} in face to face and non face to face time.     DIONNE Walsh 07/02/25 3:19 PM

## 2025-07-03 ENCOUNTER — TREATMENT (OUTPATIENT)
Dept: PHYSICAL THERAPY | Facility: CLINIC | Age: 40
End: 2025-07-03
Payer: COMMERCIAL

## 2025-07-03 DIAGNOSIS — R68.89 DECREASED ACTIVITY TOLERANCE: ICD-10-CM

## 2025-07-03 DIAGNOSIS — S06.0X0A CONCUSSION WITHOUT LOSS OF CONSCIOUSNESS, INITIAL ENCOUNTER: ICD-10-CM

## 2025-07-03 DIAGNOSIS — F07.81 POSTCONCUSSION SYNDROME: ICD-10-CM

## 2025-07-03 LAB — BV SCORE VAG QL: NORMAL

## 2025-07-03 PROCEDURE — 97112 NEUROMUSCULAR REEDUCATION: CPT | Mod: GP | Performed by: PHYSICAL THERAPIST

## 2025-07-03 NOTE — PROGRESS NOTES
Physical Therapy    Physical Therapy Treatment    Patient Name: Rachelle Laurent  MRN: [id number]  Today's Date: 7/7/2025  8 visits approved-[employer]  Primary dx=post concussion syndrome  Visit number: 8   Time Entry:   Time Calculation  Start Time: 1700  Stop Time: 1745  Time Calculation (min): 45 min     PT Therapeutic Procedures Time Entry  Neuromuscular Re-Education Time Entry: 45      Assessment:  Accomodation testing performed today, pt. With 20/20 on near vision bilaterally. The patient demonstrated impaired convergence with kacy string beads today, with tendency to compensate with right cervical rotation when trying to converge, which is likely a compensation for impaired right sided convergence.  Pt. With high levels of symptoms with VORx1 cancellation during today's visit.     Plan: snellen accomodation testing       Current Problem  1. Concussion without loss of consciousness, initial encounter  Follow Up In Physical Therapy      2. Decreased activity tolerance  Follow Up In Physical Therapy      3. Postconcussion syndrome  Follow Up In Physical Therapy                Subjective    Pt. Reports that she tried to do neuro feedback and she had a vestibular migraine and it lasted for 3 days.  She tried advil and heated her neck and head.  Overall she has had more migraines. With the topamax she is more likely to get a migraine.  She is trying to walk still and goes in the shade or when it is cold. The bike is making her nauseous.  She has tried to up the bike but just got irritated.  She is doing the pencil push-ups, they aren't getting great. They make her eye-balls hurt really bad.  Today especially they hurt.  She has a headache in her eye on the left side. She was on her phone today and then got a little migraine.  She can scroll on her phone for 2-3 minutes before it starts to bother her.  Today she has been sensitive to sounds, in general it has been better.  She is limited to 15-20 minutes of  "television.  Her headache is a 2/10.  Her neck and back are bothering her again.    Precautions: no fall risk     Vital Signs: not taken     Pain: not taken       Objective        L eye accomodation=20/20  R eye accomodation=20/20    Treatments:    Neuro Re-Ed:  -kacy string binocular  -saccades horizontal 30\"x1  -saccades vertical 30\"x1  -smooth pursuits 30\"x1  -smooth pursuits 30\"x1  -VOR cancellation horizontal  (nausea/dizzy 4/10)  -VOR cancellation vertical (headache 2/10)      OP EDUCATION:  HEP:  -kacy string  -VOR cancellation horizontal/vertical       Goals:  Active       PT Problem       PT Goal 1 (Progressing)       Start:  03/09/25    Expected End:  06/02/25       Pt. Will demonstrate reduced neck pain with flexion ranges in order to perform activities such as reading.         PT Goal 2 (Progressing)       Start:  03/09/25    Expected End:  06/02/25       Pt. Will return to tolerating 4 hours of screen time with necessary modifications in order to build up tolerance of screen time to return to work.         PT Goal 3 (Met)       Start:  03/09/25    Expected End:  06/02/25    Resolved:  04/30/25    Pt. Will complete YFRF in next 1-2 visits         PT Goal 4 (Progressing)       Start:  03/09/25    Expected End:  06/02/25       Pt. Will Improve PSFS by 3 points in order to meet MCID for significant change compared to normative values.         PT Goal 5 (Met)       Start:  03/09/25    Expected End:  06/02/25    Resolved:  04/30/25    Pt. Will be independent in HEP in next 1-2 visits to promote self efficacy, manage symptoms and meet other LTG.          Patient Stated Goal 1       Start:  03/09/25    Expected End:  06/02/25       \"almost 0 on concussion -4 symptoms-scored at 4 then December concussion happened better manual therapy of neck and makes a huge difference/exercise tolerance.         PT Goal 1 (Progressing)       Start:  03/09/25    Expected End:  06/02/25       Pt. Will tolerate exercise above " HR of 140 BPM without headache to indicate improved cardiovascular endurance and activity tolerance.         FGA (Progressing)       Start:  03/28/25    Expected End:  06/02/25       Pt. Will improve FGA by 3 points in order to indicate improved dynamic balance by end of POC.

## 2025-07-10 ENCOUNTER — TREATMENT (OUTPATIENT)
Dept: PHYSICAL THERAPY | Facility: CLINIC | Age: 40
End: 2025-07-10
Payer: COMMERCIAL

## 2025-07-10 DIAGNOSIS — F07.81 POSTCONCUSSION SYNDROME: Primary | ICD-10-CM

## 2025-07-10 DIAGNOSIS — S06.0X0A CONCUSSION WITHOUT LOSS OF CONSCIOUSNESS, INITIAL ENCOUNTER: ICD-10-CM

## 2025-07-10 DIAGNOSIS — R68.89 DECREASED ACTIVITY TOLERANCE: ICD-10-CM

## 2025-07-10 PROCEDURE — 97112 NEUROMUSCULAR REEDUCATION: CPT | Mod: GP | Performed by: PHYSICAL THERAPIST

## 2025-07-10 NOTE — PROGRESS NOTES
"Physical Therapy    Physical Therapy Treatment    Patient Name: Rachelle Laurent  MRN: [id number]  Today's Date: 7/15/2025  8 visits approved-[employer]  Primary dx=post concussion syndrome  Visit number: 2   Time Entry:   Time Calculation  Start Time: 1750  Stop Time: 1828  Time Calculation (min): 38 min     PT Therapeutic Procedures Time Entry  Neuromuscular Re-Education Time Entry: 38      Assessment:  Today's visit was spent progressing convergence exercises to addition of stereograms. The patient was challenged with barrel card and started to get a mild headache, therefore exercise was discontinued for the session.  Pt. Was able to hold cat stereogram for 10 seconds before reporting nausea.     Plan: progress stereograms       Current Problem  1. Postconcussion syndrome  Follow Up In Physical Therapy      2. Concussion without loss of consciousness, initial encounter  Follow Up In Physical Therapy      3. Decreased activity tolerance  Follow Up In Physical Therapy                  Subjective    Pt. Reports that she has a mild headache going on right now. She walked up a hill and then came home and vacuumed and scrubbed the floor. She thinks the nausea is from not eating. She was also on screens a lot today.  She feels that her concussion symptoms have been better. She hasn't had a headache in one week.  The biggest things are she is taking B2 every day and she was reading a lot and she backed off of that. She is having a hard driving. Today she drove the most than she has in the past week.  If she hits a sound wall or a guard rail. She is up to 45 seconds of VORx1 and is up to 7 of the pencil push-ups.  She noticed the pencil isnt doubling and isnt blurry anymore.      Precautions: no fall risk     Vital Signs: not taken     Pain: not taken       Objective        Pt. Arrived to visit with no device at independent level    Treatments:    Neuro Re-Ed:  -Stereogram cat 10\" hold (nausea 3/10) x7 reps   -stereogram " "barrell card 2/10 headache  -educated pt. On progressing VOR cancellation to standing  -seated habituation with moving striped lines in pt. Peripheral vision 1'x1 (nausea noted)    OP EDUCATION:  HEP:  -kacy string  -VOR cancellation horizontal/vertical standing  -Stereogram cat 10\" hold (nausea 3/10) x7 reps   -stereogram barrell card 2/10 headache  -habituation with moving horizontal lines in periphery       Goals:  Active       PT Problem       PT Goal 1 LTG: Pt. Will complete 1 minute of VOR cancellation in standing to indicate reduced visual motion sensitivity by end of POC.       Start:  06/20/25    Expected End:  09/15/25            PT Goal 2 STG: Pt. Will complete convergence with out headache by end of POC to improve quality of life with ADLS such as reading.       Start:  06/20/25    Expected End:  09/15/25            PT Goal 3 LTG: pt. will demonstrate improved score on Symptom rating scale in the sleep category to improve QOL.       Start:  06/20/25    Expected End:  09/15/25            PT Goal 4 STG: Pt. Will be independent in HEP in next 1-2 visits to promote self efficacy, manage symptoms and meet other LTG.        Start:  06/20/25    Expected End:  09/15/25            PT Goal 5 STG: pt. will be able to drive 15 minutes in next 4 weeks to indicate improved QOL.       Start:  06/20/25    Expected End:  09/15/25            Patient Stated Goal 1: Pt. Goal: \"less sensitivity to sound\"          Start:  06/20/25    Expected End:  09/15/25            PT Goal 6 LTG: pt. will return to hobby of hiking by end of POC.       Start:  06/20/25    Expected End:  09/15/25                PT Goal 7 LTG: Pt. will improve VOMS score by 10 points by end of POC.       Start:  06/20/25    Expected End:  09/15/25                      "

## 2025-07-16 ENCOUNTER — TREATMENT (OUTPATIENT)
Dept: PHYSICAL THERAPY | Facility: CLINIC | Age: 40
End: 2025-07-16
Payer: COMMERCIAL

## 2025-07-16 DIAGNOSIS — F07.81 POSTCONCUSSION SYNDROME: Primary | ICD-10-CM

## 2025-07-16 DIAGNOSIS — R68.89 DECREASED ACTIVITY TOLERANCE: ICD-10-CM

## 2025-07-16 DIAGNOSIS — S06.0X0A CONCUSSION WITHOUT LOSS OF CONSCIOUSNESS, INITIAL ENCOUNTER: ICD-10-CM

## 2025-07-16 PROCEDURE — 97112 NEUROMUSCULAR REEDUCATION: CPT | Mod: GP | Performed by: PHYSICAL THERAPIST

## 2025-07-16 PROCEDURE — 97140 MANUAL THERAPY 1/> REGIONS: CPT | Mod: GP | Performed by: PHYSICAL THERAPIST

## 2025-07-16 NOTE — PROGRESS NOTES
Physical Therapy    Physical Therapy Treatment    Patient Name: Rachelle Laurent  MRN: [id number]  Today's Date: 7/18/2025  8 visits approved-[employer]  Primary dx=post concussion syndrome  Visit number: 4  Time Entry:   Time Calculation  Start Time: 1450  Stop Time: 1530  Time Calculation (min): 40 min     PT Therapeutic Procedures Time Entry  Neuromuscular Re-Education Time Entry: 30  Manual Therapy Time Entry: 10      Assessment:  Overall pt.'s post concussion symptoms are improving with reduced sensitivity with sounds and reduced headaches.  Pt. With reduced Today's visit was spent progressing stereograms, pt. Was challenged with lifesaver card.  Repetitions were kept low today due to pt. Having a mild migraine at beginning of today's visit.  No significant increase in migraine symptoms after today's progressions.      Plan: progress oculomotor exercises, Meghan       Current Problem  1. Postconcussion syndrome  Follow Up In Physical Therapy      2. Concussion without loss of consciousness, initial encounter  Follow Up In Physical Therapy      3. Decreased activity tolerance  Follow Up In Physical Therapy                Subjective    Pt. Reports that she felt spacey earlier and felt that screens were making things worse so she took an advil and it seemed. She had her infusion on Monday so she is more likely to get a migraine within first 3-4 days.  She was also just more sound sensitive today and light sensitive today since her infusion. Neck pain today is worse. She feels that she has been doing well with the sterograms, she is struggling with the dot exercise, she can do the first 2 dots but not the 3rd one. She can do it twice/3 times and has gotten up to 10 of the pencil push-ups.  She is doing 7 of the cats with a 10 second hold. She hasn't done as much cardio so she feels that that makes her migraines worse.  She got her HR up to 130 while walking and kept it around 115-118.  She walked uphill last week,  "the first time her HR was at 130 and that was fine and she was okay the next day.  She did the same thing thrusday last week and was trying to keep HR at 130 and it was at 134 the entire time.    Precautions: no fall risk     Vital Signs: not taken     Pain: not taken       Objective        Pt. Arrived to visit with no device at independent level    Treatments:    Neuro Re-Ed:  -eccentric circles x2 reps around 16 inches  -lifesaver card x4 reps bottom to top at 16 inches  -seated habituation with moving striped lines in pt. Peripheral vision 1'x1 (nausea noted)    Manual  -myofascial release bilateral UT, levator scapulae, cervical erector spinae  -bilateral suboccipital release    OP EDUCATION:  HEP:  -kacy string  -VOR cancellation horizontal/vertical standing  -Stereogram cat 10\" hold (nausea 3/10) x7 reps   -stereogram barrell card 2/10 headache  -stereogram eccentric circles  -stereogram lifesaver card  -habituation with moving horizontal lines in periphery       Goals:  Active       PT Problem       PT Goal 1 (Progressing)       Start:  03/09/25    Expected End:  06/02/25       Pt. Will demonstrate reduced neck pain with flexion ranges in order to perform activities such as reading.         PT Goal 2 (Progressing)       Start:  03/09/25    Expected End:  06/02/25       Pt. Will return to tolerating 4 hours of screen time with necessary modifications in order to build up tolerance of screen time to return to work.         PT Goal 3 (Met)       Start:  03/09/25    Expected End:  06/02/25    Resolved:  04/30/25    Pt. Will complete YFRF in next 1-2 visits         PT Goal 4 (Progressing)       Start:  03/09/25    Expected End:  06/02/25       Pt. Will Improve PSFS by 3 points in order to meet MCID for significant change compared to normative values.         PT Goal 5 (Met)       Start:  03/09/25    Expected End:  06/02/25    Resolved:  04/30/25    Pt. Will be independent in HEP in next 1-2 visits to promote self " "efficacy, manage symptoms and meet other LTG.          Patient Stated Goal 1       Start:  03/09/25    Expected End:  06/02/25       \"almost 0 on concussion -4 symptoms-scored at 4 then December concussion happened better manual therapy of neck and makes a huge difference/exercise tolerance.         PT Goal 1 (Progressing)       Start:  03/09/25    Expected End:  06/02/25       Pt. Will tolerate exercise above HR of 140 BPM without headache to indicate improved cardiovascular endurance and activity tolerance.         FGA (Progressing)       Start:  03/28/25    Expected End:  06/02/25       Pt. Will improve FGA by 3 points in order to indicate improved dynamic balance by end of POC.                 "

## 2025-08-01 ENCOUNTER — TREATMENT (OUTPATIENT)
Dept: PHYSICAL THERAPY | Facility: CLINIC | Age: 40
End: 2025-08-01
Payer: COMMERCIAL

## 2025-08-01 DIAGNOSIS — R68.89 DECREASED ACTIVITY TOLERANCE: ICD-10-CM

## 2025-08-01 DIAGNOSIS — S06.0X0A CONCUSSION WITHOUT LOSS OF CONSCIOUSNESS, INITIAL ENCOUNTER: ICD-10-CM

## 2025-08-01 DIAGNOSIS — F07.81 POSTCONCUSSION SYNDROME: Primary | ICD-10-CM

## 2025-08-01 DIAGNOSIS — F07.81 POST CONCUSSIVE SYNDROME: ICD-10-CM

## 2025-08-01 PROCEDURE — 97112 NEUROMUSCULAR REEDUCATION: CPT | Mod: GP | Performed by: PHYSICAL THERAPIST

## 2025-08-06 ENCOUNTER — TREATMENT (OUTPATIENT)
Dept: PHYSICAL THERAPY | Facility: CLINIC | Age: 40
End: 2025-08-06
Payer: COMMERCIAL

## 2025-08-06 ENCOUNTER — APPOINTMENT (OUTPATIENT)
Dept: ENDOCRINOLOGY | Facility: CLINIC | Age: 40
End: 2025-08-06
Payer: COMMERCIAL

## 2025-08-06 VITALS
WEIGHT: 201.8 LBS | BODY MASS INDEX: 35.75 KG/M2 | HEIGHT: 63 IN | SYSTOLIC BLOOD PRESSURE: 110 MMHG | DIASTOLIC BLOOD PRESSURE: 62 MMHG | RESPIRATION RATE: 16 BRPM | HEART RATE: 76 BPM

## 2025-08-06 DIAGNOSIS — S06.0X0A CONCUSSION WITHOUT LOSS OF CONSCIOUSNESS, INITIAL ENCOUNTER: ICD-10-CM

## 2025-08-06 DIAGNOSIS — F07.81 POSTCONCUSSION SYNDROME: Primary | ICD-10-CM

## 2025-08-06 DIAGNOSIS — E16.1 REACTIVE HYPOGLYCEMIA: ICD-10-CM

## 2025-08-06 DIAGNOSIS — R53.83 OTHER FATIGUE: ICD-10-CM

## 2025-08-06 DIAGNOSIS — E03.9 HYPOTHYROIDISM, UNSPECIFIED TYPE: Primary | ICD-10-CM

## 2025-08-06 DIAGNOSIS — R68.89 DECREASED ACTIVITY TOLERANCE: ICD-10-CM

## 2025-08-06 PROBLEM — F41.8 SITUATIONAL ANXIETY: Status: RESOLVED | Noted: 2023-03-18 | Resolved: 2025-08-06

## 2025-08-06 PROBLEM — H81.10 BENIGN POSITIONAL VERTIGO: Status: RESOLVED | Noted: 2023-03-18 | Resolved: 2025-08-06

## 2025-08-06 PROBLEM — R19.7 DIARRHEA: Status: RESOLVED | Noted: 2023-03-18 | Resolved: 2025-08-06

## 2025-08-06 PROBLEM — M99.03 LUMBAR REGION SOMATIC DYSFUNCTION: Status: RESOLVED | Noted: 2023-03-18 | Resolved: 2025-08-06

## 2025-08-06 PROBLEM — N63.10 PAINFUL LUMPY RIGHT BREAST: Status: RESOLVED | Noted: 2023-03-18 | Resolved: 2025-08-06

## 2025-08-06 PROBLEM — M54.50 RIGHT LOW BACK PAIN: Status: RESOLVED | Noted: 2023-03-18 | Resolved: 2025-08-06

## 2025-08-06 PROBLEM — E66.811 CLASS 1 OBESITY WITH BODY MASS INDEX (BMI) OF 32.0 TO 32.9 IN ADULT: Status: RESOLVED | Noted: 2023-03-18 | Resolved: 2025-08-06

## 2025-08-06 PROBLEM — R45.0 JITTERY FEELING: Status: RESOLVED | Noted: 2023-03-18 | Resolved: 2025-08-06

## 2025-08-06 PROBLEM — N64.4 PAINFUL LUMPY RIGHT BREAST: Status: RESOLVED | Noted: 2023-03-18 | Resolved: 2025-08-06

## 2025-08-06 PROBLEM — J32.9 RECURRENT SINUS INFECTIONS: Status: RESOLVED | Noted: 2023-03-18 | Resolved: 2025-08-06

## 2025-08-06 PROCEDURE — 97140 MANUAL THERAPY 1/> REGIONS: CPT | Mod: GP | Performed by: PHYSICAL THERAPIST

## 2025-08-06 PROCEDURE — 99204 OFFICE O/P NEW MOD 45 MIN: CPT | Performed by: INTERNAL MEDICINE

## 2025-08-06 PROCEDURE — 99214 OFFICE O/P EST MOD 30 MIN: CPT | Performed by: INTERNAL MEDICINE

## 2025-08-06 PROCEDURE — 1036F TOBACCO NON-USER: CPT | Performed by: INTERNAL MEDICINE

## 2025-08-06 PROCEDURE — 97112 NEUROMUSCULAR REEDUCATION: CPT | Mod: GP | Performed by: PHYSICAL THERAPIST

## 2025-08-06 PROCEDURE — 3008F BODY MASS INDEX DOCD: CPT | Performed by: INTERNAL MEDICINE

## 2025-08-06 ASSESSMENT — PAIN SCALES - GENERAL: PAINLEVEL_OUTOF10: 0-NO PAIN

## 2025-08-06 ASSESSMENT — ENCOUNTER SYMPTOMS
CHILLS: 0
PALPITATIONS: 0
DEPRESSION: 0
SHORTNESS OF BREATH: 0
VOMITING: 0
HEADACHES: 0
DIARRHEA: 0
LOSS OF SENSATION IN FEET: 1
OCCASIONAL FEELINGS OF UNSTEADINESS: 1
FEVER: 0
FATIGUE: 0
NAUSEA: 0
COUGH: 0

## 2025-08-06 NOTE — PROGRESS NOTES
Endocrinology New Patient Consult  Subjective   Patient ID: Rachelle Laurent is a 39 y.o. female who presents for Hashimoto's Thyroiditis. Patient was referred by DIONNE Ornelas    PCP: DIONNE Ornelas    HPI  39-year-old referred by NATALIE Aguayo CNP for evaluation of Hashimoto's.  She has been hypothyroid for about 15 years.  She has tried levothyroxine tablets but has been intolerant with increased dysautonomia vertigo and word finding and on last trial of levothyroxine tablets she also had some chest pain with exertion.  She is now back on generic Tirosint which she has done the best on.  She has not been on branded Levoxyl or Synthroid in the past.  She does take her thyroid medicine in the morning on an empty stomach.  Her last thyroid blood work in April was normal with a TSH of 1.2 however her neurologist ordered a TPO antibody in the fall and noted it to be high.  She also notes feeling of her blood sugar fluctuating after eating primarily after eating breakfast.  She does not eat meat so has a hard time finding protein sources and breakfast tends to be the lowest protein meal.  She did adjust her breakfast to oats that she makes herself which has helped considerably.  Does have a family history of diabetes in her father and sister.  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    Review of Systems   Constitutional:  Negative for chills, fatigue and fever.   Respiratory:  Negative for cough and shortness of breath.    Cardiovascular:  Negative for chest pain and palpitations.   Gastrointestinal:  Negative for diarrhea, nausea and vomiting.   Neurological:  Negative for headaches.       Problem List[1]    Medical History[2]     Surgical History[3]     Tobacco Use History[4]   Social History     Substance and Sexual Activity   Alcohol Use Not Currently      Marital status:   Employment: Unemployed    Family History[5]     Home Meds:  Current Outpatient Medications    Medication Instructions    acetaminophen (TYLENOL) 1,000 mg, Every 6 hours PRN    albuterol 90 mcg/actuation inhaler 2 puffs, 4 times daily PRN    azelastine (Astelin) 137 mcg (0.1 %) nasal spray SPRAY 2 SPRAYS NASALLY TWO TIMES DAILY AS NEEDED    cetirizine (ZYRTEC) 10 mg, Daily    EPINEPHrine 0.3 mg/0.3 mL injection syringe     ergocalciferol (Vitamin D-2) 1.25 MG (42117 UT) capsule 1 capsule, Every 7 days    fluticasone (Flonase Sensimist) 27.5 mcg/actuation nasal spray Use as directed    ibuprofen (ADVIL) 200 mg, Every 6 hours PRN    immune globulin, human, (Gammagard Liquid) infusion 15 g, Once Weekly    levothyroxine (TIROSINT) 100 mcg, oral, Daily, Take on an empty stomach at the same time each day, either 30 to 60 minutes prior to breakfast    lidocaine-prilocaine (Emla) 2.5-2.5 % cream Apply as directed    MAGNESIUM OXIDE ORAL 250 mg, Daily    mupirocin (Bactroban) 2 % ointment Apply as directed    OptiChamber [employer] VHC inhaler USE AS DIRECTED WITH METERED DOSE INHALERS    PARoxetine (PAXIL) 60 mg, Daily    RIBOFLAVIN, VITAMIN B2, ORAL 1,000 mg, Daily    rizatriptan (MAXALT) 5 mg    SUMAtriptan (IMITREX) 50 mg, oral, Once as needed, May repeat dose once in 2 hours if no relief.  Do not exceed 2 doses in 24 hours.    topiramate (TOPAMAX) 25 mg, Daily        RX Allergies[6]     Objective   Vitals:    08/06/25 1443   BP: 110/62   Pulse: 76   Resp: 16      Vitals:    08/06/25 1443   Weight: 91.5 kg (201 lb 12.8 oz)      Body mass index is 35.75 kg/m².   Physical Exam  Constitutional:       Appearance: Normal appearance. She is overweight.   HENT:      Head: Normocephalic and atraumatic.   Neck:      Thyroid: No thyroid mass, thyromegaly or thyroid tenderness.     Cardiovascular:      Rate and Rhythm: Normal rate and regular rhythm.      Heart sounds: No murmur heard.     No gallop.   Pulmonary:      Effort: Pulmonary effort is normal.      Breath sounds: Normal breath sounds.   Abdominal:      Palpations:  Abdomen is soft.      Comments: benign     Neurological:      General: No focal deficit present.      Mental Status: She is alert and oriented to person, place, and time.      Deep Tendon Reflexes: Reflexes are normal and symmetric.     Psychiatric:         Behavior: Behavior is cooperative.         Labs:  Lab Results   Component Value Date    TSH 1.27 04/23/2025    FREET4 1.1 04/23/2025      Lab Results   Component Value Date    THYROIDPAB >1,000 (H) 09/04/2024        Assessment/Plan   Assessment & Plan  Hypothyroidism, unspecified type    Orders:    Thyroxine, Free; Future    Thyroid Stimulating Hormone; Future    Triiodothyronine, Free; Future    Other fatigue    Orders:    Cortisol AM; Future    Reactive hypoglycemia    Orders:    Hemoglobin A1C; Future    39-year-old here for evaluation of hypothyroidism also with likely reactive hypoglycemia.  We reviewed her course.  We discussed Hashimoto's as her likely underlying diagnosis causing her hypothyroidism and is unlikely to be a new diagnosis for her.  We did discuss management relies on adjusting her thyroid medication.  She is concerned about future cost of Tirosint generic.  We did discuss trying a low voxel which has no dairy or gluten in it if she requires trying tablets again.  In the meantime I would recommend rechecking her thyroid levels as last blood work was done on tablets.  We did discuss her symptoms after breakfast which do sound like reactive hypoglycemia and we discussed strategies to add protein to her breakfast which she will work on.  I would recommend a A1c as we discussed that reactive hypoglycemia can be common in individuals who are prone to diabetes later in life.  Given her autoimmune history I would also recommend a morning cortisol she has had some issues with fatigue and lightheadedness.  She will do morning blood work in the near future and we will adjust as needed and I will see her back in 6 months.  I encouraged her to call or  message with concerns or questions  Electronically signed by:  Octavia Ochoa MD 08/06/25  2:45 PM         [1]   Patient Active Problem List  Diagnosis    Accessory navicular bone of right foot    Allergic rhinitis    Chronic rhinitis    Mixed rhinitis    Anxiety    Mechanical back pain    Upper back pain    Cervical radicular pain    Change in bowel movement    Chronic deep vein thrombosis (DVT) of popliteal vein (Multi)    Chronic recurrent sinusitis    Chronic venous insufficiency    Post concussive syndrome    Post concussive syndrome    Pinched nerve    Cough variant asthma (HHS-HCC)    CVID (common variable immunodeficiency)    Dizziness    Dysfunction of left eustachian tube    Dysmenorrhea    Dyspareunia in female    Esophageal spasm    GERD (gastroesophageal reflux disease)    Fall    Head trauma    Heavy menses    Hypothyroidism    Secondary hypothyroidism    Left shoulder pain    Left temporal headache    Headache    Lymphadenopathy    Nasal congestion    Nasal obstruction    Nasal septal deviation    Cervical somatic dysfunction    Somatic dysfunction of abdominal region    Cervicalgia    Neck pain    Neck strain    Nonsuicidal self-harm (Multi)    Palpitations    Pelvic floor dysfunction    Nasal crusting    Post-nasal drainage    Sinus pressure    Pain, joint, ankle, left    Right leg pain    Segmental and somatic dysfunction of thoracic region    Segmental and somatic dysfunction of upper extremity    Cranial somatic dysfunction    Somatic dysfunction of lower extremities    Somatic dysfunction of sacral region    Spasm of thoracic back muscle    Syncope and collapse    Tendonitis    Thoracic outlet syndrome    Urethral irritation    UTI symptoms    Vaginal itching    Vaginal lump    Weight gain    Mild persistent asthma    Pelvic region somatic dysfunction    Rib cage region somatic dysfunction    Class 1 obesity with body mass index (BMI) of 30.0 to 30.9 in adult    Common migraine without aura     OCD (obsessive compulsive disorder)    Concussion with no loss of consciousness    Acute bacterial sinusitis    Venous insufficiency of right leg    Hypoglycemia    Arthralgia of elbow    Burning sensation    Dysuria    Epigastric pain    Fatigue    History of thrombosis    History of viral infection    Post-thrombotic syndrome of right lower extremity    Postprandial hypoglycemia    Snapping scapula syndrome    Acute sinusitis    Chronic sinusitis    Seasonal allergic rhinitis    Tendinitis    Altered bowel function    Obesity with body mass index 30 or greater    Obesity    Common variable agammaglobulinemia (Multi)    Dysfunction of eustachian tube    Gastroesophageal reflux disease    Injury of head    Pain of left shoulder region    Somatic dysfunction of cervical region    Chest pain    Breast pain    Somatic dysfunction of lumbar region    Somatic dysfunction of pelvis region    Somatic dysfunction of rib    Temporal headache    Adenopathy    Acquired deviated nasal septum    Strain of neck muscle    Non-suicidal self-harm (Multi)    Obsessive-compulsive disorder    Ankle pain    Pain of right lower extremity    Compression injury of nerve    Postconcussion syndrome    Nasal discharge    Segmental and somatic dysfunction    Somatic dysfunction of lower extremity    Urethritis    Pruritus of vagina    Vaginitis    Peripheral venous insufficiency    Decreased activity tolerance    Neck stiffness   [2]   Past Medical History:  Diagnosis Date    Acute deep vein thrombosis (DVT) of femoral vein of right lower extremity 12/06/2016    Formatting of this note might be different from the original. Diagnosed 10/29/2016    Allergic 1999    Anxiety 1999    Chronic urticaria     CVID (common variable immunodeficiency)     Depression 12/2024    Deviated nasal septum 06/09/2013    Acquired deviated nasal septum    GERD (gastroesophageal reflux disease) 2016    Headache 02/2022    Obsessive-compulsive disorder, unspecified  06/09/2013    Obsessive compulsive disorder    Other conditions influencing health status 06/09/2013    Brachial Plexus Palsy    Unspecified asthma, uncomplicated ([facility]) 06/09/2013    Extrinsic asthma   [3]   Past Surgical History:  Procedure Laterality Date    MOUTH SURGERY  07/02/2014    Oral Surgery Tooth Extraction   [4]   Social History  Tobacco Use   Smoking Status Never    Passive exposure: Never   Smokeless Tobacco Never   [5]   Family History  Problem Relation Name Age of Onset    Coronary artery disease Mother [place]     Depression Mother [place]     Hypertension Mother [place]     Aortic aneurysm Mother [place]     Other (cholecystectomy) Mother [place]     Learning disabilities Mother [place]     ADD / ADHD Mother [place]     Other (Pulmonary emphysema) Mother [place]     Cancer Mother [place]         cancer growth on her leg but pt doesn't know what it's called.    Diabetes Father Sebastien     Heart failure Father Sebastien     Stroke Father Sebastien     Blood clot Father Sebastien     Migraines Sister Ritika     Other (immune deficiency disorder) Sister Ritika     Adrenal disorder Sister Ritika     Diabetes Sister Ritika     Hypertension Sister Ritika     Kidney disease Sister Ritika     Learning disabilities Sister Ritika     Rheum arthritis Sister Ritika     Anxiety disorder Sister Ritika    [6]   Allergies  Allergen Reactions    Nut - Unspecified Unknown     Almonds - Intolerance     Hazelnuts - Allergy  (Showed on allergy test)    Other Unknown     Seafood     Quinolones Other     Tendonitis     Shellfish Containing Products Unknown    Wheat Unknown    Peanut Unknown    Sulfa (Sulfonamide Antibiotics) Hives

## 2025-08-06 NOTE — ASSESSMENT & PLAN NOTE
Orders:    Thyroxine, Free; Future    Thyroid Stimulating Hormone; Future    Triiodothyronine, Free; Future

## 2025-08-06 NOTE — PATIENT INSTRUCTIONS
Morning blood work in the near future as discussed  Adjustment in medication based on those results  Please call or message with any concerns or questions  Follow-up in 6 months

## 2025-08-06 NOTE — PROGRESS NOTES
Physical Therapy    Physical Therapy Treatment    Patient Name: Rachelle Laurent  MRN: [id number]  Today's Date: 8/6/2025  8 visits approved-[facility]  Primary dx=post concussion syndrome  Visit number=6   Time Entry:   Time Calculation  Start Time: 0950  Stop Time: 1030  Time Calculation (min): 40 min     PT Therapeutic Procedures Time Entry  Neuromuscular Re-Education Time Entry: 14  Manual Therapy Time Entry: 24      Assessment:  Pt. Arrived today with report of dizziness especially when turning her head.  I suspected that the patient's symptoms were cervicogenic in nature, therefore session focused on manual therapy for cervical spine.  However, pt. With continued dizziness following manual therapy.  Pt. Continues to make progress with her oculomotor exercises and has increased the duration of her sustained convergence holds with stereograms.         Plan: progress oculomotor exercises, Meghan       Current Problem  1. Postconcussion syndrome  Follow Up In Physical Therapy      2. Concussion without loss of consciousness, initial encounter  Follow Up In Physical Therapy      3. Decreased activity tolerance  Follow Up In Physical Therapy                  Subjective    Pt. Reports that she is feeling dizzy today, she didn't drive. She did upper body yesterday and her rib feels very tired. She tried to stretch today. She has stiffness in her neck and the back top rib hurts on both sides.  She is feeling a little overwhelmed because she has a lot of exercises for her home program.  She had questions because he feels that her memory is not as good.  She also feels that she has bad spatial awareness.  She thinks she might be getting a sinus infection.      Precautions: no fall risk     Vital Signs: not taken     Pain: not taken       Objective        Pt. Arrived to visit with no device at independent level    Treatments:    Neuro Re-Ed:  -kacy string 5 second hold with 1st bead up to about 7cm away x3 rounds with 5  "second hold   -telescoping kacy string near and far 1st bead x5 resp  -educated pt. On discontinuing pencil push-ups    Manual:  -bilateral  suboccipital release  -TPR bilateral UT, levator scapulae  -supine 1st rib mobilization on R side grade II  -seated 1st rib mobilization on R side grade II    OP EDUCATION:  HEP:  -kacy string  -VOR cancellation horizontal/vertical standing  -Stereogram cat 10\" hold (nausea 3/10) x7 reps   -stereogram barrell card 2/10 headache  -stereogram eccentric circles  -stereogram lifesaver card  -habituation with moving horizontal lines in periphery       Goals:  Active       PT Problem       PT Goal 1 LTG: Pt. Will complete 1 minute of VOR cancellation in standing to indicate reduced visual motion sensitivity by end of POC.       Start:  06/20/25    Expected End:  09/15/25            PT Goal 2 STG: Pt. Will complete convergence with out headache by end of POC to improve quality of life with ADLS such as reading.       Start:  06/20/25    Expected End:  09/15/25            PT Goal 3 LTG: pt. will demonstrate improved score on Symptom rating scale in the sleep category to improve QOL.       Start:  06/20/25    Expected End:  09/15/25            PT Goal 4 STG: Pt. Will be independent in HEP in next 1-2 visits to promote self efficacy, manage symptoms and meet other LTG.        Start:  06/20/25    Expected End:  09/15/25            PT Goal 5 STG: pt. will be able to drive 15 minutes in next 4 weeks to indicate improved QOL.       Start:  06/20/25    Expected End:  09/15/25            Patient Stated Goal 1: Pt. Goal: \"less sensitivity to sound\"          Start:  06/20/25    Expected End:  09/15/25            PT Goal 6 LTG: pt. will return to hobby of hiking by end of POC.       Start:  06/20/25    Expected End:  09/15/25                PT Goal 7 LTG: Pt. will improve VOMS score by 10 points by end of POC.       Start:  06/20/25    Expected End:  09/15/25                            "

## 2025-08-06 NOTE — LETTER
August 6, 2025     DIONNE Ornelas  [address]  [facility], [address]  [facility] [id number]    Patient: Rachelle Laurent   YOB: 1985   Date of Visit: 8/6/2025       Dear DIONNE Barksdale:    Thank you for referring Rachelle Laurent to me for evaluation. Below are my notes for this consultation.  If you have questions, please do not hesitate to call me. I look forward to following your patient along with you.       Sincerely,     Octavia Ochoa MD      CC: No Recipients  ______________________________________________________________________________________    Endocrinology New Patient Consult  Subjective  Patient ID: Rachelle Laurent is a 39 y.o. female who presents for Hashimoto's Thyroiditis. Patient was referred by DIONNE Ornelas    PCP: DIONNE Ornelas    HPI  39-year-old referred by NATALIE Aguayo CNP for evaluation of Hashimoto's.  She has been hypothyroid for about 15 years.  She has tried levothyroxine tablets but has been intolerant with increased dysautonomia vertigo and word finding and on last trial of levothyroxine tablets she also had some chest pain with exertion.  She is now back on generic Tirosint which she has done the best on.  She has not been on branded Levoxyl or Synthroid in the past.  She does take her thyroid medicine in the morning on an empty stomach.  Her last thyroid blood work in April was normal with a TSH of 1.2 however her neurologist ordered a TPO antibody in the fall and noted it to be high.  She also notes feeling of her blood sugar fluctuating after eating primarily after eating breakfast.  She does not eat meat so has a hard time finding protein sources and breakfast tends to be the lowest protein meal.  She did adjust her breakfast to oats that she makes herself which has helped considerably.  Does have a family history of diabetes in her father and sister.  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    Review of Systems   Constitutional:  Negative for chills, fatigue and fever.   Respiratory:  Negative for cough and shortness of breath.    Cardiovascular:  Negative for chest pain and palpitations.   Gastrointestinal:  Negative for diarrhea, nausea and vomiting.   Neurological:  Negative for headaches.       Problem List[1]    Medical History[2]     Surgical History[3]     Tobacco Use History[4]   Social History     Substance and Sexual Activity   Alcohol Use Not Currently      Marital status:   Employment: Unemployed    Family History[5]     Home Meds:  Current Outpatient Medications   Medication Instructions   • acetaminophen (TYLENOL) 1,000 mg, Every 6 hours PRN   • albuterol 90 mcg/actuation inhaler 2 puffs, 4 times daily PRN   • azelastine (Astelin) 137 mcg (0.1 %) nasal spray SPRAY 2 SPRAYS NASALLY TWO TIMES DAILY AS NEEDED   • cetirizine (ZYRTEC) 10 mg, Daily   • EPINEPHrine 0.3 mg/0.3 mL injection syringe    • ergocalciferol (Vitamin D-2) 1.25 MG (00873 UT) capsule 1 capsule, Every 7 days   • fluticasone (Flonase Sensimist) 27.5 mcg/actuation nasal spray Use as directed   • ibuprofen (ADVIL) 200 mg, Every 6 hours PRN   • immune globulin, human, (Gammagard Liquid) infusion 15 g, Once Weekly   • levothyroxine (TIROSINT) 100 mcg, oral, Daily, Take on an empty stomach at the same time each day, either 30 to 60 minutes prior to breakfast   • lidocaine-prilocaine (Emla) 2.5-2.5 % cream Apply as directed   • MAGNESIUM OXIDE ORAL 250 mg, Daily   • mupirocin (Bactroban) 2 % ointment Apply as directed   • OptiChamber [facility] inhaler USE AS DIRECTED WITH METERED DOSE INHALERS   • PARoxetine (PAXIL) 60 mg, Daily   • RIBOFLAVIN, VITAMIN B2, ORAL 1,000 mg, Daily   • rizatriptan (MAXALT) 5 mg   • SUMAtriptan (IMITREX) 50 mg, oral, Once as needed, May repeat dose once in 2 hours if no relief.  Do not exceed 2 doses in 24 hours.   • topiramate (TOPAMAX) 25 mg, Daily        RX Allergies[6]      Objective  Vitals:    08/06/25 1443   BP: 110/62   Pulse: 76   Resp: 16      Vitals:    08/06/25 1443   Weight: 91.5 kg (201 lb 12.8 oz)      Body mass index is 35.75 kg/m².   Physical Exam  Constitutional:       Appearance: Normal appearance. She is overweight.   HENT:      Head: Normocephalic and atraumatic.   Neck:      Thyroid: No thyroid mass, thyromegaly or thyroid tenderness.     Cardiovascular:      Rate and Rhythm: Normal rate and regular rhythm.      Heart sounds: No murmur heard.     No gallop.   Pulmonary:      Effort: Pulmonary effort is normal.      Breath sounds: Normal breath sounds.   Abdominal:      Palpations: Abdomen is soft.      Comments: benign     Neurological:      General: No focal deficit present.      Mental Status: She is alert and oriented to person, place, and time.      Deep Tendon Reflexes: Reflexes are normal and symmetric.     Psychiatric:         Behavior: Behavior is cooperative.         Labs:  Lab Results   Component Value Date    TSH 1.27 04/23/2025    FREET4 1.1 04/23/2025      Lab Results   Component Value Date    THYROIDPAB >1,000 (H) 09/04/2024        Assessment/Plan  Assessment & Plan  Hypothyroidism, unspecified type    Orders:  •  Thyroxine, Free; Future  •  Thyroid Stimulating Hormone; Future  •  Triiodothyronine, Free; Future    Other fatigue    Orders:  •  Cortisol AM; Future    Reactive hypoglycemia    Orders:  •  Hemoglobin A1C; Future    39-year-old here for evaluation of hypothyroidism also with likely reactive hypoglycemia.  We reviewed her course.  We discussed Hashimoto's as her likely underlying diagnosis causing her hypothyroidism and is unlikely to be a new diagnosis for her.  We did discuss management relies on adjusting her thyroid medication.  She is concerned about future cost of Tirosint generic.  We did discuss trying a low voxel which has no dairy or gluten in it if she requires trying tablets again.  In the meantime I would recommend rechecking  her thyroid levels as last blood work was done on tablets.  We did discuss her symptoms after breakfast which do sound like reactive hypoglycemia and we discussed strategies to add protein to her breakfast which she will work on.  I would recommend a A1c as we discussed that reactive hypoglycemia can be common in individuals who are prone to diabetes later in life.  Given her autoimmune history I would also recommend a morning cortisol she has had some issues with fatigue and lightheadedness.  She will do morning blood work in the near future and we will adjust as needed and I will see her back in 6 months.  I encouraged her to call or message with concerns or questions  Electronically signed by:  Octavia Ochoa MD 08/06/25  2:45 PM         [1]  Patient Active Problem List  Diagnosis   • Accessory navicular bone of right foot   • Allergic rhinitis   • Chronic rhinitis   • Mixed rhinitis   • Anxiety   • Mechanical back pain   • Upper back pain   • Cervical radicular pain   • Change in bowel movement   • Chronic deep vein thrombosis (DVT) of popliteal vein (Multi)   • Chronic recurrent sinusitis   • Chronic venous insufficiency   • Post concussive syndrome   • Post concussive syndrome   • Pinched nerve   • Cough variant asthma ([facility])   • CVID (common variable immunodeficiency)   • Dizziness   • Dysfunction of left eustachian tube   • Dysmenorrhea   • Dyspareunia in female   • Esophageal spasm   • GERD (gastroesophageal reflux disease)   • Fall   • Head trauma   • Heavy menses   • Hypothyroidism   • Secondary hypothyroidism   • Left shoulder pain   • Left temporal headache   • Headache   • Lymphadenopathy   • Nasal congestion   • Nasal obstruction   • Nasal septal deviation   • Cervical somatic dysfunction   • Somatic dysfunction of abdominal region   • Cervicalgia   • Neck pain   • Neck strain   • Nonsuicidal self-harm (Multi)   • Palpitations   • Pelvic floor dysfunction   • Nasal crusting   • Post-nasal drainage    • Sinus pressure   • Pain, joint, ankle, left   • Right leg pain   • Segmental and somatic dysfunction of thoracic region   • Segmental and somatic dysfunction of upper extremity   • Cranial somatic dysfunction   • Somatic dysfunction of lower extremities   • Somatic dysfunction of sacral region   • Spasm of thoracic back muscle   • Syncope and collapse   • Tendonitis   • Thoracic outlet syndrome   • Urethral irritation   • UTI symptoms   • Vaginal itching   • Vaginal lump   • Weight gain   • Mild persistent asthma   • Pelvic region somatic dysfunction   • Rib cage region somatic dysfunction   • Class 1 obesity with body mass index (BMI) of 30.0 to 30.9 in adult   • Common migraine without aura   • OCD (obsessive compulsive disorder)   • Concussion with no loss of consciousness   • Acute bacterial sinusitis   • Venous insufficiency of right leg   • Hypoglycemia   • Arthralgia of elbow   • Burning sensation   • Dysuria   • Epigastric pain   • Fatigue   • History of thrombosis   • History of viral infection   • Post-thrombotic syndrome of right lower extremity   • Postprandial hypoglycemia   • Snapping scapula syndrome   • Acute sinusitis   • Chronic sinusitis   • Seasonal allergic rhinitis   • Tendinitis   • Altered bowel function   • Obesity with body mass index 30 or greater   • Obesity   • Common variable agammaglobulinemia (Multi)   • Dysfunction of eustachian tube   • Gastroesophageal reflux disease   • Injury of head   • Pain of left shoulder region   • Somatic dysfunction of cervical region   • Chest pain   • Breast pain   • Somatic dysfunction of lumbar region   • Somatic dysfunction of pelvis region   • Somatic dysfunction of rib   • Temporal headache   • Adenopathy   • Acquired deviated nasal septum   • Strain of neck muscle   • Non-suicidal self-harm (Multi)   • Obsessive-compulsive disorder   • Ankle pain   • Pain of right lower extremity   • Compression injury of nerve   • Postconcussion syndrome   •  Nasal discharge   • Segmental and somatic dysfunction   • Somatic dysfunction of lower extremity   • Urethritis   • Pruritus of vagina   • Vaginitis   • Peripheral venous insufficiency   • Decreased activity tolerance   • Neck stiffness   [2]  Past Medical History:  Diagnosis Date   • Acute deep vein thrombosis (DVT) of femoral vein of right lower extremity 12/06/2016    Formatting of this note might be different from the original. Diagnosed 10/29/2016   • Allergic 1999   • Anxiety 1999   • Chronic urticaria    • CVID (common variable immunodeficiency)    • Depression 12/2024   • Deviated nasal septum 06/09/2013    Acquired deviated nasal septum   • GERD (gastroesophageal reflux disease) 2016   • Headache 02/2022   • Obsessive-compulsive disorder, unspecified 06/09/2013    Obsessive compulsive disorder   • Other conditions influencing health status 06/09/2013    Brachial Plexus Palsy   • Unspecified asthma, uncomplicated ([facility]) 06/09/2013    Extrinsic asthma   [3]  Past Surgical History:  Procedure Laterality Date   • MOUTH SURGERY  07/02/2014    Oral Surgery Tooth Extraction   [4]  Social History  Tobacco Use   Smoking Status Never   • Passive exposure: Never   Smokeless Tobacco Never   [5]  Family History  Problem Relation Name Age of Onset   • Coronary artery disease Mother [place]    • Depression Mother [place]    • Hypertension Mother [place]    • Aortic aneurysm Mother [place]    • Other (cholecystectomy) Mother [place]    • Learning disabilities Mother [place]    • ADD / ADHD Mother [place]    • Other (Pulmonary emphysema) Mother [place]    • Cancer Mother [place]         cancer growth on her leg but pt doesn't know what it's called.   • Diabetes Father Sebastien    • Heart failure Father Sebastien    • Stroke Father Sebastien    • Blood clot Father Sebastien    • Migraines Sister Ritika    • Other (immune deficiency disorder) Sister Ritika    • Adrenal disorder Sister Ritika    • Diabetes Sister Ritika    • Hypertension  Sister Ritika    • Kidney disease Sister Ritika    • Learning disabilities Sister Ritika    • Rheum arthritis Sister Ritika    • Anxiety disorder Sister Ritika    [6]  Allergies  Allergen Reactions   • Nut - Unspecified Unknown     Almonds - Intolerance     Hazelnuts - Allergy  (Showed on allergy test)   • Other Unknown     Seafood    • Quinolones Other     Tendonitis    • Shellfish Containing Products Unknown   • Wheat Unknown   • Peanut Unknown   • Sulfa (Sulfonamide Antibiotics) Hives        [1]  Patient Active Problem List  Diagnosis   • Accessory navicular bone of right foot   • Allergic rhinitis   • Chronic rhinitis   • Mixed rhinitis   • Anxiety   • Mechanical back pain   • Upper back pain   • Cervical radicular pain   • Change in bowel movement   • Chronic deep vein thrombosis (DVT) of popliteal vein (Multi)   • Chronic recurrent sinusitis   • Chronic venous insufficiency   • Post concussive syndrome   • Post concussive syndrome   • Pinched nerve   • Cough variant asthma ([facility])   • CVID (common variable immunodeficiency)   • Dizziness   • Dysfunction of left eustachian tube   • Dysmenorrhea   • Dyspareunia in female   • Esophageal spasm   • GERD (gastroesophageal reflux disease)   • Fall   • Head trauma   • Heavy menses   • Hypothyroidism   • Secondary hypothyroidism   • Left shoulder pain   • Left temporal headache   • Headache   • Lymphadenopathy   • Nasal congestion   • Nasal obstruction   • Nasal septal deviation   • Cervical somatic dysfunction   • Somatic dysfunction of abdominal region   • Cervicalgia   • Neck pain   • Neck strain   • Nonsuicidal self-harm (Multi)   • Palpitations   • Pelvic floor dysfunction   • Nasal crusting   • Post-nasal drainage   • Sinus pressure   • Pain, joint, ankle, left   • Right leg pain   • Segmental and somatic dysfunction of thoracic region   • Segmental and somatic dysfunction of upper extremity   • Cranial somatic dysfunction   • Somatic dysfunction of lower  extremities   • Somatic dysfunction of sacral region   • Spasm of thoracic back muscle   • Syncope and collapse   • Tendonitis   • Thoracic outlet syndrome   • Urethral irritation   • UTI symptoms   • Vaginal itching   • Vaginal lump   • Weight gain   • Mild persistent asthma   • Pelvic region somatic dysfunction   • Rib cage region somatic dysfunction   • Class 1 obesity with body mass index (BMI) of 30.0 to 30.9 in adult   • Common migraine without aura   • OCD (obsessive compulsive disorder)   • Concussion with no loss of consciousness   • Acute bacterial sinusitis   • Venous insufficiency of right leg   • Hypoglycemia   • Arthralgia of elbow   • Burning sensation   • Dysuria   • Epigastric pain   • Fatigue   • History of thrombosis   • History of viral infection   • Post-thrombotic syndrome of right lower extremity   • Postprandial hypoglycemia   • Snapping scapula syndrome   • Acute sinusitis   • Chronic sinusitis   • Seasonal allergic rhinitis   • Tendinitis   • Altered bowel function   • Obesity with body mass index 30 or greater   • Obesity   • Common variable agammaglobulinemia (Multi)   • Dysfunction of eustachian tube   • Gastroesophageal reflux disease   • Injury of head   • Pain of left shoulder region   • Somatic dysfunction of cervical region   • Chest pain   • Breast pain   • Somatic dysfunction of lumbar region   • Somatic dysfunction of pelvis region   • Somatic dysfunction of rib   • Temporal headache   • Adenopathy   • Acquired deviated nasal septum   • Strain of neck muscle   • Non-suicidal self-harm (Multi)   • Obsessive-compulsive disorder   • Ankle pain   • Pain of right lower extremity   • Compression injury of nerve   • Postconcussion syndrome   • Nasal discharge   • Segmental and somatic dysfunction   • Somatic dysfunction of lower extremity   • Urethritis   • Pruritus of vagina   • Vaginitis   • Peripheral venous insufficiency   • Decreased activity tolerance   • Neck stiffness    [2]  Past Medical History:  Diagnosis Date   • Acute deep vein thrombosis (DVT) of femoral vein of right lower extremity 12/06/2016    Formatting of this note might be different from the original. Diagnosed 10/29/2016   • Allergic 1999   • Anxiety 1999   • Chronic urticaria    • CVID (common variable immunodeficiency)    • Depression 12/2024   • Deviated nasal septum 06/09/2013    Acquired deviated nasal septum   • GERD (gastroesophageal reflux disease) 2016   • Headache 02/2022   • Obsessive-compulsive disorder, unspecified 06/09/2013    Obsessive compulsive disorder   • Other conditions influencing health status 06/09/2013    Brachial Plexus Palsy   • Unspecified asthma, uncomplicated ([facility]) 06/09/2013    Extrinsic asthma   [3]  Past Surgical History:  Procedure Laterality Date   • MOUTH SURGERY  07/02/2014    Oral Surgery Tooth Extraction   [4]  Social History  Tobacco Use   Smoking Status Never   • Passive exposure: Never   Smokeless Tobacco Never   [5]  Family History  Problem Relation Name Age of Onset   • Coronary artery disease Mother [place]    • Depression Mother [place]    • Hypertension Mother [place]    • Aortic aneurysm Mother [place]    • Other (cholecystectomy) Mother [place]    • Learning disabilities Mother [place]    • ADD / ADHD Mother [place]    • Other (Pulmonary emphysema) Mother [place]    • Cancer Mother [place]         cancer growth on her leg but pt doesn't know what it's called.   • Diabetes Father Sebastien    • Heart failure Father Sebastien    • Stroke Father Sebastien    • Blood clot Father Sebastien    • Migraines Sister Ritika    • Other (immune deficiency disorder) Sister Ritika    • Adrenal disorder Sister Ritika    • Diabetes Sister Ritika    • Hypertension Sister Ritika    • Kidney disease Sister Ritika    • Learning disabilities Sister Ritika    • Rheum arthritis Sister Ritika    • Anxiety disorder Sister Ritika    [6]  Allergies  Allergen Reactions   • Nut - Unspecified Unknown     Almonds -  Intolerance     Hazelnuts - Allergy  (Showed on allergy test)   • Other Unknown     Seafood    • Quinolones Other     Tendonitis    • Shellfish Containing Products Unknown   • Wheat Unknown   • Peanut Unknown   • Sulfa (Sulfonamide Antibiotics) Hives

## 2025-08-12 DIAGNOSIS — K21.9 GASTROESOPHAGEAL REFLUX DISEASE, UNSPECIFIED WHETHER ESOPHAGITIS PRESENT: Primary | ICD-10-CM

## 2025-08-12 RX ORDER — OMEPRAZOLE 40 MG/1
40 CAPSULE, DELAYED RELEASE ORAL
COMMUNITY
End: 2025-08-12 | Stop reason: SDUPTHER

## 2025-08-12 RX ORDER — OMEPRAZOLE 40 MG/1
40 CAPSULE, DELAYED RELEASE ORAL
Qty: 30 CAPSULE | Refills: 0 | Status: SHIPPED | OUTPATIENT
Start: 2025-08-12

## 2025-08-13 ENCOUNTER — TREATMENT (OUTPATIENT)
Dept: PHYSICAL THERAPY | Facility: CLINIC | Age: 40
End: 2025-08-13
Payer: COMMERCIAL

## 2025-08-13 DIAGNOSIS — M43.6 NECK STIFFNESS: ICD-10-CM

## 2025-08-13 DIAGNOSIS — S06.0X0A CONCUSSION WITHOUT LOSS OF CONSCIOUSNESS, INITIAL ENCOUNTER: ICD-10-CM

## 2025-08-13 DIAGNOSIS — F07.81 POSTCONCUSSION SYNDROME: Primary | ICD-10-CM

## 2025-08-13 DIAGNOSIS — R68.89 DECREASED ACTIVITY TOLERANCE: ICD-10-CM

## 2025-08-13 PROCEDURE — 97140 MANUAL THERAPY 1/> REGIONS: CPT | Mod: GP | Performed by: PHYSICAL THERAPIST

## 2025-08-13 PROCEDURE — 97112 NEUROMUSCULAR REEDUCATION: CPT | Mod: GP | Performed by: PHYSICAL THERAPIST

## 2025-08-20 ENCOUNTER — APPOINTMENT (OUTPATIENT)
Dept: PHYSICAL THERAPY | Facility: CLINIC | Age: 40
End: 2025-08-20
Payer: COMMERCIAL

## 2025-08-21 ENCOUNTER — APPOINTMENT (OUTPATIENT)
Dept: PHYSICAL THERAPY | Facility: CLINIC | Age: 40
End: 2025-08-21
Payer: COMMERCIAL

## 2025-08-22 ENCOUNTER — APPOINTMENT (OUTPATIENT)
Dept: OPHTHALMOLOGY | Facility: CLINIC | Age: 40
End: 2025-08-22
Payer: COMMERCIAL

## 2025-08-22 DIAGNOSIS — F07.81 POST CONCUSSIVE SYNDROME: Primary | ICD-10-CM

## 2025-08-22 DIAGNOSIS — H52.7 REFRACTION ERROR: ICD-10-CM

## 2025-08-22 PROCEDURE — 99203 OFFICE O/P NEW LOW 30 MIN: CPT | Performed by: OPHTHALMOLOGY

## 2025-08-22 RX ORDER — AMOXICILLIN AND CLAVULANATE POTASSIUM 875; 125 MG/1; MG/1
1 TABLET, FILM COATED ORAL
COMMUNITY
Start: 2025-08-07

## 2025-08-22 RX ORDER — FLUCONAZOLE 150 MG/1
1 TABLET ORAL
COMMUNITY
Start: 2025-08-07

## 2025-08-22 ASSESSMENT — REFRACTION_MANIFEST
OD_CYLINDER: +0.25
OS_SPHERE: -1.00
OS_CYLINDER: -0.50
OS_AXIS: 090
OS_AXIS: 091
OS_SPHERE: -1.00
OD_AXIS: 092
OS_CYLINDER: +0.50
METHOD_AUTOREFRACTION: 1
OD_SPHERE: -0.75
OD_SPHERE: -0.50

## 2025-08-22 ASSESSMENT — CUP TO DISC RATIO
OD_RATIO: 0.25
OS_RATIO: 0.25

## 2025-08-22 ASSESSMENT — VISUAL ACUITY
OD_CC+: -1
METHOD: SNELLEN - LINEAR
CORRECTION_TYPE: GLASSES
OD_CC: 20/20
OS_CC+: -1
OS_CC: 20/20

## 2025-08-22 ASSESSMENT — SLIT LAMP EXAM - LIDS
COMMENTS: NORMAL
COMMENTS: NORMAL

## 2025-08-22 ASSESSMENT — ENCOUNTER SYMPTOMS
EYES NEGATIVE: 0
PSYCHIATRIC NEGATIVE: 0
ALLERGIC/IMMUNOLOGIC NEGATIVE: 0
NEUROLOGICAL NEGATIVE: 0
GASTROINTESTINAL NEGATIVE: 0
ENDOCRINE NEGATIVE: 0
CARDIOVASCULAR NEGATIVE: 0
HEMATOLOGIC/LYMPHATIC NEGATIVE: 0
CONSTITUTIONAL NEGATIVE: 0
RESPIRATORY NEGATIVE: 0
MUSCULOSKELETAL NEGATIVE: 0

## 2025-08-22 ASSESSMENT — CONF VISUAL FIELD
OS_INFERIOR_NASAL_RESTRICTION: 0
OS_INFERIOR_TEMPORAL_RESTRICTION: 0
OS_SUPERIOR_NASAL_RESTRICTION: 0
OD_SUPERIOR_TEMPORAL_RESTRICTION: 0
OD_INFERIOR_NASAL_RESTRICTION: 0
OS_SUPERIOR_TEMPORAL_RESTRICTION: 0
OD_INFERIOR_TEMPORAL_RESTRICTION: 0
OD_SUPERIOR_NASAL_RESTRICTION: 0
OD_NORMAL: 1
OS_NORMAL: 1
METHOD: COUNTING FINGERS

## 2025-08-22 ASSESSMENT — PAIN SCALES - GENERAL: PAINLEVEL_OUTOF10: 0-NO PAIN

## 2025-08-22 ASSESSMENT — REFRACTION_WEARINGRX
SPECS_TYPE: SVL
OS_SPHERE: -0.50
OD_SPHERE: -0.25

## 2025-08-22 ASSESSMENT — EXTERNAL EXAM - LEFT EYE: OS_EXAM: NORMAL

## 2025-08-22 ASSESSMENT — TONOMETRY
OS_IOP_MMHG: 14
OD_IOP_MMHG: 14
IOP_METHOD: GOLDMANN APPLANATION

## 2025-08-22 ASSESSMENT — EXTERNAL EXAM - RIGHT EYE: OD_EXAM: NORMAL

## 2025-08-28 ENCOUNTER — TREATMENT (OUTPATIENT)
Dept: PHYSICAL THERAPY | Facility: CLINIC | Age: 40
End: 2025-08-28
Payer: COMMERCIAL

## 2025-08-28 DIAGNOSIS — S06.0X0A CONCUSSION WITHOUT LOSS OF CONSCIOUSNESS, INITIAL ENCOUNTER: ICD-10-CM

## 2025-08-28 DIAGNOSIS — R42 DIZZINESS: Primary | ICD-10-CM

## 2025-08-28 DIAGNOSIS — R68.89 DECREASED ACTIVITY TOLERANCE: ICD-10-CM

## 2025-08-28 DIAGNOSIS — F07.81 POSTCONCUSSION SYNDROME: ICD-10-CM

## 2025-08-28 PROCEDURE — 97112 NEUROMUSCULAR REEDUCATION: CPT | Mod: GP | Performed by: PHYSICAL THERAPIST

## 2025-08-28 PROCEDURE — 97140 MANUAL THERAPY 1/> REGIONS: CPT | Mod: GP | Performed by: PHYSICAL THERAPIST

## 2025-09-05 LAB
ALBUMIN SERPL-MCNC: 4.5 G/DL (ref 3.6–5.1)
ALBUMIN/GLOB SERPL: 1.6 (CALC) (ref 1–2.5)
ALP SERPL-CCNC: 67 U/L (ref 31–125)
ALT SERPL-CCNC: 58 U/L (ref 6–29)
AST SERPL-CCNC: 34 U/L (ref 10–30)
BILIRUB DIRECT SERPL-MCNC: 0 MG/DL
BILIRUB INDIRECT SERPL-MCNC: 0.3 MG/DL (CALC) (ref 0.2–1.2)
BILIRUB SERPL-MCNC: 0.3 MG/DL (ref 0.2–1.2)
GLOBULIN SER CALC-MCNC: 2.9 G/DL (CALC) (ref 1.9–3.7)
PROT SERPL-MCNC: 7.4 G/DL (ref 6.1–8.1)

## 2025-09-06 LAB
CORTIS AM PEAK SERPL-MCNC: 19.2 MCG/DL
EST. AVERAGE GLUCOSE BLD GHB EST-MCNC: 111 MG/DL
EST. AVERAGE GLUCOSE BLD GHB EST-SCNC: 6.2 MMOL/L
HBA1C MFR BLD: 5.5 %
T3FREE SERPL-MCNC: 2.8 PG/ML (ref 2.3–4.2)
T4 FREE SERPL-MCNC: 1 NG/DL (ref 0.8–1.8)
TSH SERPL-ACNC: 2.22 MIU/L

## 2026-01-05 ENCOUNTER — APPOINTMENT (OUTPATIENT)
Dept: NEUROLOGY | Facility: CLINIC | Age: 41
End: 2026-01-05
Payer: COMMERCIAL